# Patient Record
Sex: FEMALE | Race: WHITE | NOT HISPANIC OR LATINO | Employment: OTHER | URBAN - METROPOLITAN AREA
[De-identification: names, ages, dates, MRNs, and addresses within clinical notes are randomized per-mention and may not be internally consistent; named-entity substitution may affect disease eponyms.]

---

## 2017-01-03 ENCOUNTER — HOSPITAL ENCOUNTER (OUTPATIENT)
Dept: MAMMOGRAPHY | Facility: HOSPITAL | Age: 82
Discharge: HOME | End: 2017-01-03
Attending: INTERNAL MEDICINE

## 2017-01-03 ENCOUNTER — AMBULATORY - HEALTHEAST (OUTPATIENT)
Dept: NURSING | Facility: CLINIC | Age: 82
End: 2017-01-03

## 2017-01-03 ENCOUNTER — COMMUNICATION - HEALTHEAST (OUTPATIENT)
Dept: INTERNAL MEDICINE | Facility: CLINIC | Age: 82
End: 2017-01-03

## 2017-01-03 DIAGNOSIS — N63.22 BREAST LUMP ON LEFT SIDE AT 10 O'CLOCK POSITION: ICD-10-CM

## 2017-01-03 DIAGNOSIS — N63.20 LEFT BREAST MASS: ICD-10-CM

## 2017-01-04 ENCOUNTER — COMMUNICATION - HEALTHEAST (OUTPATIENT)
Dept: ONCOLOGY | Facility: HOSPITAL | Age: 82
End: 2017-01-04

## 2017-01-04 ENCOUNTER — AMBULATORY - HEALTHEAST (OUTPATIENT)
Dept: NURSING | Facility: CLINIC | Age: 82
End: 2017-01-04

## 2017-01-05 ENCOUNTER — COMMUNICATION - HEALTHEAST (OUTPATIENT)
Dept: ONCOLOGY | Facility: HOSPITAL | Age: 82
End: 2017-01-05

## 2017-01-05 ENCOUNTER — AMBULATORY - HEALTHEAST (OUTPATIENT)
Dept: NURSING | Facility: CLINIC | Age: 82
End: 2017-01-05

## 2017-01-05 ENCOUNTER — AMBULATORY - HEALTHEAST (OUTPATIENT)
Dept: LAB | Facility: CLINIC | Age: 82
End: 2017-01-05

## 2017-01-05 ENCOUNTER — OFFICE VISIT - HEALTHEAST (OUTPATIENT)
Dept: INTERNAL MEDICINE | Facility: CLINIC | Age: 82
End: 2017-01-05

## 2017-01-05 ENCOUNTER — AMBULATORY - HEALTHEAST (OUTPATIENT)
Dept: INTERNAL MEDICINE | Facility: CLINIC | Age: 82
End: 2017-01-05

## 2017-01-05 ENCOUNTER — COMMUNICATION - HEALTHEAST (OUTPATIENT)
Dept: INTERNAL MEDICINE | Facility: CLINIC | Age: 82
End: 2017-01-05

## 2017-01-05 DIAGNOSIS — D64.9 LOW HEMOGLOBIN: ICD-10-CM

## 2017-01-05 DIAGNOSIS — C50.919 BREAST CA (H): ICD-10-CM

## 2017-01-05 LAB
LAB AP CHARGES (HE HISTORICAL CONVERSION): ABNORMAL
LAB AP IHC ER/PR REPORT,ADDENDUM (HE HISTORICAL CONVERSION): ABNORMAL
PATH REPORT.COMMENTS IMP SPEC: ABNORMAL
PATH REPORT.COMMENTS IMP SPEC: ABNORMAL
PATH REPORT.FINAL DX SPEC: ABNORMAL
PATH REPORT.GROSS SPEC: ABNORMAL
PATH REPORT.RELEVANT HX SPEC: ABNORMAL
RESULT FLAG (HE HISTORICAL CONVERSION): ABNORMAL

## 2017-01-06 ENCOUNTER — AMBULATORY - HEALTHEAST (OUTPATIENT)
Dept: NURSING | Facility: CLINIC | Age: 82
End: 2017-01-06

## 2017-01-06 ENCOUNTER — COMMUNICATION - HEALTHEAST (OUTPATIENT)
Dept: TELEHEALTH | Facility: CLINIC | Age: 82
End: 2017-01-06

## 2017-01-06 DIAGNOSIS — D64.9 ANEMIA: ICD-10-CM

## 2017-01-06 DIAGNOSIS — D64.9 HEMOGLOBIN LOW: ICD-10-CM

## 2017-01-12 ENCOUNTER — OFFICE VISIT - HEALTHEAST (OUTPATIENT)
Dept: INTERNAL MEDICINE | Facility: CLINIC | Age: 82
End: 2017-01-12

## 2017-01-12 ENCOUNTER — AMBULATORY - HEALTHEAST (OUTPATIENT)
Dept: LAB | Facility: CLINIC | Age: 82
End: 2017-01-12

## 2017-01-12 DIAGNOSIS — D64.9 HEMOGLOBIN LOW: ICD-10-CM

## 2017-01-12 DIAGNOSIS — D64.9 ANEMIA, UNSPECIFIED: ICD-10-CM

## 2017-01-12 DIAGNOSIS — D50.9 IRON DEFICIENCY ANEMIA: ICD-10-CM

## 2017-01-12 ASSESSMENT — MIFFLIN-ST. JEOR: SCORE: 980.79

## 2017-01-17 ENCOUNTER — OFFICE VISIT - HEALTHEAST (OUTPATIENT)
Dept: SURGERY | Facility: CLINIC | Age: 82
End: 2017-01-17

## 2017-01-17 DIAGNOSIS — C50.512 MALIGNANT NEOPLASM OF LOWER-OUTER QUADRANT OF LEFT FEMALE BREAST (H): ICD-10-CM

## 2017-01-18 ENCOUNTER — AMBULATORY - HEALTHEAST (OUTPATIENT)
Dept: LAB | Facility: CLINIC | Age: 82
End: 2017-01-18

## 2017-01-18 DIAGNOSIS — D64.9 ANEMIA: ICD-10-CM

## 2017-01-18 DIAGNOSIS — D64.9 HEMOGLOBIN LOW: ICD-10-CM

## 2017-01-19 ENCOUNTER — COMMUNICATION - HEALTHEAST (OUTPATIENT)
Dept: INTERNAL MEDICINE | Facility: CLINIC | Age: 82
End: 2017-01-19

## 2017-01-26 ENCOUNTER — COMMUNICATION - HEALTHEAST (OUTPATIENT)
Dept: INTERNAL MEDICINE | Facility: CLINIC | Age: 82
End: 2017-01-26

## 2017-01-26 ENCOUNTER — OFFICE VISIT - HEALTHEAST (OUTPATIENT)
Dept: INTERNAL MEDICINE | Facility: CLINIC | Age: 82
End: 2017-01-26

## 2017-01-26 DIAGNOSIS — Z86.718 HISTORY OF DVT (DEEP VEIN THROMBOSIS): ICD-10-CM

## 2017-01-26 DIAGNOSIS — D64.9 ANEMIA: ICD-10-CM

## 2017-01-26 DIAGNOSIS — E55.9 VITAMIN D DEFICIENCY: ICD-10-CM

## 2017-01-26 DIAGNOSIS — M81.0 OSTEOPOROSIS: ICD-10-CM

## 2017-01-26 ASSESSMENT — MIFFLIN-ST. JEOR: SCORE: 978.98

## 2017-02-01 ENCOUNTER — AMBULATORY - HEALTHEAST (OUTPATIENT)
Dept: LAB | Facility: CLINIC | Age: 82
End: 2017-02-01

## 2017-02-01 DIAGNOSIS — D64.9 ANEMIA, UNSPECIFIED: ICD-10-CM

## 2017-02-08 ENCOUNTER — AMBULATORY - HEALTHEAST (OUTPATIENT)
Dept: LAB | Facility: CLINIC | Age: 82
End: 2017-02-08

## 2017-02-08 ENCOUNTER — COMMUNICATION - HEALTHEAST (OUTPATIENT)
Dept: INTERNAL MEDICINE | Facility: CLINIC | Age: 82
End: 2017-02-08

## 2017-02-08 DIAGNOSIS — Z86.718 HISTORY OF DVT (DEEP VEIN THROMBOSIS): ICD-10-CM

## 2017-02-08 DIAGNOSIS — D64.9 ANEMIA, UNSPECIFIED: ICD-10-CM

## 2017-02-08 DIAGNOSIS — I82.409 DVT (DEEP VENOUS THROMBOSIS) (H): ICD-10-CM

## 2017-02-08 DIAGNOSIS — D68.59 PRIMARY HYPERCOAGULABLE STATE (H): ICD-10-CM

## 2017-02-10 ENCOUNTER — COMMUNICATION - HEALTHEAST (OUTPATIENT)
Dept: INTERNAL MEDICINE | Facility: CLINIC | Age: 82
End: 2017-02-10

## 2017-02-23 ENCOUNTER — OFFICE VISIT - HEALTHEAST (OUTPATIENT)
Dept: INTERNAL MEDICINE | Facility: CLINIC | Age: 82
End: 2017-02-23

## 2017-02-23 ENCOUNTER — COMMUNICATION - HEALTHEAST (OUTPATIENT)
Dept: INTERNAL MEDICINE | Facility: CLINIC | Age: 82
End: 2017-02-23

## 2017-02-23 DIAGNOSIS — Z86.718 HISTORY OF DVT (DEEP VEIN THROMBOSIS): ICD-10-CM

## 2017-02-23 DIAGNOSIS — D50.9 IRON DEFICIENCY ANEMIA: ICD-10-CM

## 2017-02-23 DIAGNOSIS — D68.59 PRIMARY HYPERCOAGULABLE STATE (H): ICD-10-CM

## 2017-02-23 DIAGNOSIS — I82.409 DVT (DEEP VENOUS THROMBOSIS) (H): ICD-10-CM

## 2017-02-23 DIAGNOSIS — I10 ESSENTIAL HYPERTENSION: ICD-10-CM

## 2017-02-23 ASSESSMENT — MIFFLIN-ST. JEOR: SCORE: 949.95

## 2017-03-09 ENCOUNTER — AMBULATORY - HEALTHEAST (OUTPATIENT)
Dept: LAB | Facility: CLINIC | Age: 82
End: 2017-03-09

## 2017-03-09 ENCOUNTER — COMMUNICATION - HEALTHEAST (OUTPATIENT)
Dept: INTERNAL MEDICINE | Facility: CLINIC | Age: 82
End: 2017-03-09

## 2017-03-09 DIAGNOSIS — D68.59 PRIMARY HYPERCOAGULABLE STATE (H): ICD-10-CM

## 2017-03-09 DIAGNOSIS — I82.409 DVT (DEEP VENOUS THROMBOSIS) (H): ICD-10-CM

## 2017-03-09 DIAGNOSIS — Z86.718 HISTORY OF DVT (DEEP VEIN THROMBOSIS): ICD-10-CM

## 2017-03-17 ENCOUNTER — OFFICE VISIT - HEALTHEAST (OUTPATIENT)
Dept: SURGERY | Facility: CLINIC | Age: 82
End: 2017-03-17

## 2017-03-17 DIAGNOSIS — C50.312 MALIGNANT NEOPLASM OF LOWER-INNER QUADRANT OF LEFT FEMALE BREAST (H): ICD-10-CM

## 2017-03-23 ENCOUNTER — COMMUNICATION - HEALTHEAST (OUTPATIENT)
Dept: INTERNAL MEDICINE | Facility: CLINIC | Age: 82
End: 2017-03-23

## 2017-03-23 ENCOUNTER — AMBULATORY - HEALTHEAST (OUTPATIENT)
Dept: LAB | Facility: CLINIC | Age: 82
End: 2017-03-23

## 2017-03-23 DIAGNOSIS — D50.9 IRON DEFICIENCY ANEMIA: ICD-10-CM

## 2017-03-23 DIAGNOSIS — D68.59 PRIMARY HYPERCOAGULABLE STATE (H): ICD-10-CM

## 2017-03-23 DIAGNOSIS — I82.409 DVT (DEEP VENOUS THROMBOSIS) (H): ICD-10-CM

## 2017-04-07 ENCOUNTER — COMMUNICATION - HEALTHEAST (OUTPATIENT)
Dept: INTERNAL MEDICINE | Facility: CLINIC | Age: 82
End: 2017-04-07

## 2017-04-07 ENCOUNTER — AMBULATORY - HEALTHEAST (OUTPATIENT)
Dept: LAB | Facility: CLINIC | Age: 82
End: 2017-04-07

## 2017-04-07 DIAGNOSIS — D64.9 ANEMIA, UNSPECIFIED: ICD-10-CM

## 2017-04-07 DIAGNOSIS — D68.59 PRIMARY HYPERCOAGULABLE STATE (H): ICD-10-CM

## 2017-04-07 DIAGNOSIS — I82.409 DVT (DEEP VENOUS THROMBOSIS) (H): ICD-10-CM

## 2017-04-07 DIAGNOSIS — Z86.718 HISTORY OF DVT (DEEP VEIN THROMBOSIS): ICD-10-CM

## 2017-04-09 ENCOUNTER — COMMUNICATION - HEALTHEAST (OUTPATIENT)
Dept: INTERNAL MEDICINE | Facility: CLINIC | Age: 82
End: 2017-04-09

## 2017-04-20 ENCOUNTER — OFFICE VISIT - HEALTHEAST (OUTPATIENT)
Dept: PODIATRY | Facility: CLINIC | Age: 82
End: 2017-04-20

## 2017-04-20 ENCOUNTER — OFFICE VISIT - HEALTHEAST (OUTPATIENT)
Dept: INTERNAL MEDICINE | Facility: CLINIC | Age: 82
End: 2017-04-20

## 2017-04-20 DIAGNOSIS — L84 TYLOMA: ICD-10-CM

## 2017-04-20 DIAGNOSIS — D50.9 IRON DEFICIENCY ANEMIA: ICD-10-CM

## 2017-04-20 ASSESSMENT — MIFFLIN-ST. JEOR: SCORE: 977.17

## 2017-04-21 ENCOUNTER — COMMUNICATION - HEALTHEAST (OUTPATIENT)
Dept: INTERNAL MEDICINE | Facility: CLINIC | Age: 82
End: 2017-04-21

## 2017-05-05 ENCOUNTER — AMBULATORY - HEALTHEAST (OUTPATIENT)
Dept: LAB | Facility: CLINIC | Age: 82
End: 2017-05-05

## 2017-05-05 ENCOUNTER — COMMUNICATION - HEALTHEAST (OUTPATIENT)
Dept: INTERNAL MEDICINE | Facility: CLINIC | Age: 82
End: 2017-05-05

## 2017-05-05 DIAGNOSIS — E78.5 HYPERLIPEMIA: ICD-10-CM

## 2017-05-05 DIAGNOSIS — D68.59 PRIMARY HYPERCOAGULABLE STATE (H): ICD-10-CM

## 2017-05-05 DIAGNOSIS — I82.409 DVT (DEEP VENOUS THROMBOSIS) (H): ICD-10-CM

## 2017-05-05 DIAGNOSIS — D64.9 ANEMIA: ICD-10-CM

## 2017-05-05 DIAGNOSIS — Z86.718 HISTORY OF DVT (DEEP VEIN THROMBOSIS): ICD-10-CM

## 2017-05-15 ENCOUNTER — COMMUNICATION - HEALTHEAST (OUTPATIENT)
Dept: INTERNAL MEDICINE | Facility: CLINIC | Age: 82
End: 2017-05-15

## 2017-06-07 ENCOUNTER — AMBULATORY - HEALTHEAST (OUTPATIENT)
Dept: LAB | Facility: CLINIC | Age: 82
End: 2017-06-07

## 2017-06-07 ENCOUNTER — COMMUNICATION - HEALTHEAST (OUTPATIENT)
Dept: INTERNAL MEDICINE | Facility: CLINIC | Age: 82
End: 2017-06-07

## 2017-06-07 DIAGNOSIS — D64.9 ANEMIA: ICD-10-CM

## 2017-06-07 DIAGNOSIS — Z86.718 HISTORY OF DVT (DEEP VEIN THROMBOSIS): ICD-10-CM

## 2017-06-07 DIAGNOSIS — D68.59 PRIMARY HYPERCOAGULABLE STATE (H): ICD-10-CM

## 2017-06-07 DIAGNOSIS — I82.409 DVT (DEEP VENOUS THROMBOSIS) (H): ICD-10-CM

## 2017-06-08 ENCOUNTER — COMMUNICATION - HEALTHEAST (OUTPATIENT)
Dept: INTERNAL MEDICINE | Facility: CLINIC | Age: 82
End: 2017-06-08

## 2017-07-07 ENCOUNTER — COMMUNICATION - HEALTHEAST (OUTPATIENT)
Dept: INTERNAL MEDICINE | Facility: CLINIC | Age: 82
End: 2017-07-07

## 2017-07-07 ENCOUNTER — AMBULATORY - HEALTHEAST (OUTPATIENT)
Dept: LAB | Facility: CLINIC | Age: 82
End: 2017-07-07

## 2017-07-07 DIAGNOSIS — D68.59 PRIMARY HYPERCOAGULABLE STATE (H): ICD-10-CM

## 2017-07-07 DIAGNOSIS — Z86.718 HISTORY OF DVT (DEEP VEIN THROMBOSIS): ICD-10-CM

## 2017-07-07 DIAGNOSIS — I82.409 DVT (DEEP VENOUS THROMBOSIS) (H): ICD-10-CM

## 2017-07-07 DIAGNOSIS — D64.9 ANEMIA: ICD-10-CM

## 2017-07-27 ENCOUNTER — OFFICE VISIT - HEALTHEAST (OUTPATIENT)
Dept: INTERNAL MEDICINE | Facility: CLINIC | Age: 82
End: 2017-07-27

## 2017-07-27 DIAGNOSIS — M81.0 OSTEOPOROSIS: ICD-10-CM

## 2017-07-27 DIAGNOSIS — D50.9 IRON DEFICIENCY ANEMIA: ICD-10-CM

## 2017-07-27 ASSESSMENT — MIFFLIN-ST. JEOR: SCORE: 980.34

## 2017-08-11 ENCOUNTER — COMMUNICATION - HEALTHEAST (OUTPATIENT)
Dept: INTERNAL MEDICINE | Facility: CLINIC | Age: 82
End: 2017-08-11

## 2017-08-11 ENCOUNTER — AMBULATORY - HEALTHEAST (OUTPATIENT)
Dept: LAB | Facility: CLINIC | Age: 82
End: 2017-08-11

## 2017-08-11 DIAGNOSIS — I82.409 DVT (DEEP VENOUS THROMBOSIS) (H): ICD-10-CM

## 2017-08-11 DIAGNOSIS — D68.59 PRIMARY HYPERCOAGULABLE STATE (H): ICD-10-CM

## 2017-08-11 DIAGNOSIS — D64.9 ANEMIA: ICD-10-CM

## 2017-08-11 DIAGNOSIS — Z86.718 HISTORY OF DVT (DEEP VEIN THROMBOSIS): ICD-10-CM

## 2017-08-11 DIAGNOSIS — D50.9 IRON DEFICIENCY ANEMIA: ICD-10-CM

## 2017-09-07 ENCOUNTER — OFFICE VISIT - HEALTHEAST (OUTPATIENT)
Dept: SURGERY | Facility: CLINIC | Age: 82
End: 2017-09-07

## 2017-09-07 ENCOUNTER — COMMUNICATION - HEALTHEAST (OUTPATIENT)
Dept: SURGERY | Facility: CLINIC | Age: 82
End: 2017-09-07

## 2017-09-07 DIAGNOSIS — C50.312 MALIGNANT NEOPLASM OF LOWER-INNER QUADRANT OF LEFT FEMALE BREAST (H): ICD-10-CM

## 2017-09-15 ENCOUNTER — COMMUNICATION - HEALTHEAST (OUTPATIENT)
Dept: INTERNAL MEDICINE | Facility: CLINIC | Age: 82
End: 2017-09-15

## 2017-09-15 ENCOUNTER — AMBULATORY - HEALTHEAST (OUTPATIENT)
Dept: LAB | Facility: CLINIC | Age: 82
End: 2017-09-15

## 2017-09-15 DIAGNOSIS — Z86.718 HISTORY OF DVT (DEEP VEIN THROMBOSIS): ICD-10-CM

## 2017-09-15 DIAGNOSIS — D68.59 PRIMARY HYPERCOAGULABLE STATE (H): ICD-10-CM

## 2017-09-15 DIAGNOSIS — D64.9 ANEMIA: ICD-10-CM

## 2017-09-15 DIAGNOSIS — I82.409 DVT (DEEP VENOUS THROMBOSIS) (H): ICD-10-CM

## 2017-09-29 ENCOUNTER — COMMUNICATION - HEALTHEAST (OUTPATIENT)
Dept: INTERNAL MEDICINE | Facility: CLINIC | Age: 82
End: 2017-09-29

## 2017-09-29 ENCOUNTER — AMBULATORY - HEALTHEAST (OUTPATIENT)
Dept: LAB | Facility: CLINIC | Age: 82
End: 2017-09-29

## 2017-09-29 DIAGNOSIS — I82.409 DVT (DEEP VENOUS THROMBOSIS) (H): ICD-10-CM

## 2017-09-29 DIAGNOSIS — D68.59 PRIMARY HYPERCOAGULABLE STATE (H): ICD-10-CM

## 2017-09-29 DIAGNOSIS — Z86.718 HISTORY OF DVT (DEEP VEIN THROMBOSIS): ICD-10-CM

## 2017-10-20 ENCOUNTER — COMMUNICATION - HEALTHEAST (OUTPATIENT)
Dept: INTERNAL MEDICINE | Facility: CLINIC | Age: 82
End: 2017-10-20

## 2017-10-20 DIAGNOSIS — Z86.718 HISTORY OF DVT (DEEP VEIN THROMBOSIS): ICD-10-CM

## 2017-10-26 ENCOUNTER — COMMUNICATION - HEALTHEAST (OUTPATIENT)
Dept: NURSING | Facility: CLINIC | Age: 82
End: 2017-10-26

## 2017-10-27 ENCOUNTER — COMMUNICATION - HEALTHEAST (OUTPATIENT)
Dept: INTERNAL MEDICINE | Facility: CLINIC | Age: 82
End: 2017-10-27

## 2017-10-27 ENCOUNTER — AMBULATORY - HEALTHEAST (OUTPATIENT)
Dept: LAB | Facility: CLINIC | Age: 82
End: 2017-10-27

## 2017-10-27 DIAGNOSIS — Z86.718 HISTORY OF DVT (DEEP VEIN THROMBOSIS): ICD-10-CM

## 2017-10-27 DIAGNOSIS — D68.59 PRIMARY HYPERCOAGULABLE STATE (H): ICD-10-CM

## 2017-10-27 DIAGNOSIS — I82.409 DVT (DEEP VENOUS THROMBOSIS) (H): ICD-10-CM

## 2017-11-06 ENCOUNTER — OFFICE VISIT - HEALTHEAST (OUTPATIENT)
Dept: INTERNAL MEDICINE | Facility: CLINIC | Age: 82
End: 2017-11-06

## 2017-11-06 DIAGNOSIS — E53.8 VITAMIN B 12 DEFICIENCY: ICD-10-CM

## 2017-11-06 DIAGNOSIS — Z51.81 MEDICATION MONITORING ENCOUNTER: ICD-10-CM

## 2017-11-06 DIAGNOSIS — I82.409 DVT (DEEP VENOUS THROMBOSIS) (H): ICD-10-CM

## 2017-11-06 DIAGNOSIS — Z23 NEED FOR INFLUENZA VACCINATION: ICD-10-CM

## 2017-11-06 DIAGNOSIS — M81.0 OSTEOPOROSIS: ICD-10-CM

## 2017-11-06 DIAGNOSIS — E78.5 HYPERLIPIDEMIA: ICD-10-CM

## 2017-11-06 DIAGNOSIS — D50.9 IRON DEFICIENCY ANEMIA: ICD-10-CM

## 2017-11-06 LAB
CHOLEST SERPL-MCNC: 155 MG/DL
FASTING STATUS PATIENT QL REPORTED: NO
HDLC SERPL-MCNC: 41 MG/DL
LDLC SERPL CALC-MCNC: 81 MG/DL
TRIGL SERPL-MCNC: 164 MG/DL

## 2017-11-06 ASSESSMENT — MIFFLIN-ST. JEOR: SCORE: 975.81

## 2017-11-07 ENCOUNTER — COMMUNICATION - HEALTHEAST (OUTPATIENT)
Dept: INTERNAL MEDICINE | Facility: CLINIC | Age: 82
End: 2017-11-07

## 2017-11-07 DIAGNOSIS — E78.5 HYPERLIPEMIA: ICD-10-CM

## 2017-11-10 ENCOUNTER — COMMUNICATION - HEALTHEAST (OUTPATIENT)
Dept: INTERNAL MEDICINE | Facility: CLINIC | Age: 82
End: 2017-11-10

## 2017-11-28 ENCOUNTER — COMMUNICATION - HEALTHEAST (OUTPATIENT)
Dept: INTERNAL MEDICINE | Facility: CLINIC | Age: 82
End: 2017-11-28

## 2017-11-28 ENCOUNTER — AMBULATORY - HEALTHEAST (OUTPATIENT)
Dept: LAB | Facility: CLINIC | Age: 82
End: 2017-11-28

## 2017-11-28 DIAGNOSIS — Z86.718 HISTORY OF DVT (DEEP VEIN THROMBOSIS): ICD-10-CM

## 2017-11-28 DIAGNOSIS — D68.59 PRIMARY HYPERCOAGULABLE STATE (H): ICD-10-CM

## 2017-11-28 DIAGNOSIS — I82.409 DVT (DEEP VENOUS THROMBOSIS) (H): ICD-10-CM

## 2017-12-12 ENCOUNTER — COMMUNICATION - HEALTHEAST (OUTPATIENT)
Dept: INTERNAL MEDICINE | Facility: CLINIC | Age: 82
End: 2017-12-12

## 2017-12-12 ENCOUNTER — AMBULATORY - HEALTHEAST (OUTPATIENT)
Dept: LAB | Facility: CLINIC | Age: 82
End: 2017-12-12

## 2017-12-12 DIAGNOSIS — I82.409 DVT (DEEP VENOUS THROMBOSIS) (H): ICD-10-CM

## 2017-12-12 DIAGNOSIS — Z86.718 HISTORY OF DVT (DEEP VEIN THROMBOSIS): ICD-10-CM

## 2017-12-12 DIAGNOSIS — D68.59 PRIMARY HYPERCOAGULABLE STATE (H): ICD-10-CM

## 2017-12-14 ENCOUNTER — RECORDS - HEALTHEAST (OUTPATIENT)
Dept: ADMINISTRATIVE | Facility: OTHER | Age: 82
End: 2017-12-14

## 2017-12-16 ENCOUNTER — COMMUNICATION - HEALTHEAST (OUTPATIENT)
Dept: INTERNAL MEDICINE | Facility: CLINIC | Age: 82
End: 2017-12-16

## 2017-12-28 ENCOUNTER — COMMUNICATION - HEALTHEAST (OUTPATIENT)
Dept: SCHEDULING | Facility: CLINIC | Age: 82
End: 2017-12-28

## 2017-12-28 DIAGNOSIS — I87.009 POST-PHLEBITIC SYNDROME: ICD-10-CM

## 2017-12-29 ENCOUNTER — RECORDS - HEALTHEAST (OUTPATIENT)
Dept: BONE DENSITY | Facility: CLINIC | Age: 82
End: 2017-12-29

## 2017-12-29 ENCOUNTER — COMMUNICATION - HEALTHEAST (OUTPATIENT)
Dept: NURSING | Facility: CLINIC | Age: 82
End: 2017-12-29

## 2017-12-29 ENCOUNTER — RECORDS - HEALTHEAST (OUTPATIENT)
Dept: ADMINISTRATIVE | Facility: OTHER | Age: 82
End: 2017-12-29

## 2017-12-29 ENCOUNTER — COMMUNICATION - HEALTHEAST (OUTPATIENT)
Dept: INTERNAL MEDICINE | Facility: CLINIC | Age: 82
End: 2017-12-29

## 2017-12-29 ENCOUNTER — AMBULATORY - HEALTHEAST (OUTPATIENT)
Dept: LAB | Facility: CLINIC | Age: 82
End: 2017-12-29

## 2017-12-29 DIAGNOSIS — M81.0 AGE-RELATED OSTEOPOROSIS WITHOUT CURRENT PATHOLOGICAL FRACTURE: ICD-10-CM

## 2017-12-29 DIAGNOSIS — D50.9 IRON DEFICIENCY ANEMIA: ICD-10-CM

## 2017-12-29 DIAGNOSIS — D68.59 PRIMARY HYPERCOAGULABLE STATE (H): ICD-10-CM

## 2017-12-29 DIAGNOSIS — I82.409 DVT (DEEP VENOUS THROMBOSIS) (H): ICD-10-CM

## 2017-12-29 DIAGNOSIS — Z86.718 HISTORY OF DVT (DEEP VEIN THROMBOSIS): ICD-10-CM

## 2018-01-01 ENCOUNTER — RECORDS - HEALTHEAST (OUTPATIENT)
Dept: LAB | Facility: CLINIC | Age: 83
End: 2018-01-01

## 2018-01-01 ENCOUNTER — AMBULATORY - HEALTHEAST (OUTPATIENT)
Dept: LAB | Facility: CLINIC | Age: 83
End: 2018-01-01

## 2018-01-01 ENCOUNTER — INFUSION - HEALTHEAST (OUTPATIENT)
Dept: INFUSION THERAPY | Facility: HOSPITAL | Age: 83
End: 2018-01-01

## 2018-01-01 ENCOUNTER — COMMUNICATION - HEALTHEAST (OUTPATIENT)
Dept: INTERNAL MEDICINE | Facility: CLINIC | Age: 83
End: 2018-01-01

## 2018-01-01 ENCOUNTER — HOSPITAL ENCOUNTER (OUTPATIENT)
Dept: MAMMOGRAPHY | Facility: CLINIC | Age: 83
Discharge: HOME | End: 2018-12-07
Attending: SPECIALIST

## 2018-01-01 ENCOUNTER — HOSPITAL ENCOUNTER (OUTPATIENT)
Dept: SURGERY | Facility: CLINIC | Age: 83
Discharge: HOME | End: 2018-12-07
Attending: SPECIALIST

## 2018-01-01 ENCOUNTER — RECORDS - HEALTHEAST (OUTPATIENT)
Dept: ADMINISTRATIVE | Facility: OTHER | Age: 83
End: 2018-01-01

## 2018-01-01 ENCOUNTER — AMBULATORY - HEALTHEAST (OUTPATIENT)
Dept: NURSING | Facility: CLINIC | Age: 83
End: 2018-01-01

## 2018-01-01 ENCOUNTER — HOSPITAL ENCOUNTER (OUTPATIENT)
Dept: ULTRASOUND IMAGING | Facility: CLINIC | Age: 83
Discharge: HOME | End: 2018-12-04
Attending: INTERNAL MEDICINE

## 2018-01-01 ENCOUNTER — COMMUNICATION - HEALTHEAST (OUTPATIENT)
Dept: ANTICOAGULATION | Facility: CLINIC | Age: 83
End: 2018-01-01

## 2018-01-01 ENCOUNTER — OFFICE VISIT - HEALTHEAST (OUTPATIENT)
Dept: INTERNAL MEDICINE | Facility: CLINIC | Age: 83
End: 2018-01-01

## 2018-01-01 ENCOUNTER — AMBULATORY - HEALTHEAST (OUTPATIENT)
Dept: INTERNAL MEDICINE | Facility: CLINIC | Age: 83
End: 2018-01-01

## 2018-01-01 ENCOUNTER — AMBULATORY - HEALTHEAST (OUTPATIENT)
Dept: PHARMACY | Facility: CLINIC | Age: 83
End: 2018-01-01

## 2018-01-01 ENCOUNTER — PATIENT OUTREACH (OUTPATIENT)
Dept: CARE COORDINATION | Facility: CLINIC | Age: 83
End: 2018-01-01

## 2018-01-01 ENCOUNTER — COMMUNICATION - HEALTHEAST (OUTPATIENT)
Dept: SCHEDULING | Facility: CLINIC | Age: 83
End: 2018-01-01

## 2018-01-01 ENCOUNTER — SURGERY - HEALTHEAST (OUTPATIENT)
Dept: SURGERY | Facility: CLINIC | Age: 83
End: 2018-01-01

## 2018-01-01 ENCOUNTER — ANESTHESIA - HEALTHEAST (OUTPATIENT)
Dept: SURGERY | Facility: CLINIC | Age: 83
End: 2018-01-01

## 2018-01-01 DIAGNOSIS — I10 ESSENTIAL HYPERTENSION: ICD-10-CM

## 2018-01-01 DIAGNOSIS — D50.9 IRON DEFICIENCY ANEMIA, UNSPECIFIED IRON DEFICIENCY ANEMIA TYPE: ICD-10-CM

## 2018-01-01 DIAGNOSIS — Z23 FLU VACCINE NEED: ICD-10-CM

## 2018-01-01 DIAGNOSIS — Z86.718 HISTORY OF DEEP VENOUS THROMBOSIS: ICD-10-CM

## 2018-01-01 DIAGNOSIS — Z17.0 MALIGNANT NEOPLASM OF LOWER-INNER QUADRANT OF LEFT BREAST IN FEMALE, ESTROGEN RECEPTOR POSITIVE (H): ICD-10-CM

## 2018-01-01 DIAGNOSIS — Z00.00 HEALTH CARE MAINTENANCE: ICD-10-CM

## 2018-01-01 DIAGNOSIS — R60.9 EDEMA, UNSPECIFIED TYPE: ICD-10-CM

## 2018-01-01 DIAGNOSIS — Z86.718 HISTORY OF DVT (DEEP VEIN THROMBOSIS): ICD-10-CM

## 2018-01-01 DIAGNOSIS — I82.409 DVT (DEEP VENOUS THROMBOSIS) (H): ICD-10-CM

## 2018-01-01 DIAGNOSIS — D64.9 ANEMIA, UNSPECIFIED TYPE: ICD-10-CM

## 2018-01-01 DIAGNOSIS — D68.59 PRIMARY HYPERCOAGULABLE STATE (H): ICD-10-CM

## 2018-01-01 DIAGNOSIS — I82.4Z2 ACUTE VENOUS EMBOLISM AND THROMBOSIS OF DEEP VESSELS OF DISTAL END OF LEFT LOWER EXTREMITY (H): ICD-10-CM

## 2018-01-01 DIAGNOSIS — N30.00 ACUTE CYSTITIS WITHOUT HEMATURIA: ICD-10-CM

## 2018-01-01 DIAGNOSIS — D50.0 IRON DEFICIENCY ANEMIA DUE TO CHRONIC BLOOD LOSS: ICD-10-CM

## 2018-01-01 DIAGNOSIS — D50.9 ANEMIA, IRON DEFICIENCY: ICD-10-CM

## 2018-01-01 DIAGNOSIS — D62 ANEMIA DUE TO BLOOD LOSS, ACUTE: ICD-10-CM

## 2018-01-01 DIAGNOSIS — Z85.3 PERSONAL HISTORY OF BREAST CANCER: ICD-10-CM

## 2018-01-01 DIAGNOSIS — K22.70 BARRETT'S ESOPHAGUS WITHOUT DYSPLASIA: ICD-10-CM

## 2018-01-01 DIAGNOSIS — C50.312 MALIGNANT NEOPLASM OF LOWER-INNER QUADRANT OF LEFT BREAST IN FEMALE, ESTROGEN RECEPTOR POSITIVE (H): ICD-10-CM

## 2018-01-01 DIAGNOSIS — Z51.81 MEDICATION MONITORING ENCOUNTER: ICD-10-CM

## 2018-01-01 DIAGNOSIS — F32.A DEPRESSION: ICD-10-CM

## 2018-01-01 DIAGNOSIS — N18.30 STAGE 3 CHRONIC KIDNEY DISEASE (H): ICD-10-CM

## 2018-01-01 DIAGNOSIS — K92.1 GASTROINTESTINAL HEMORRHAGE WITH MELENA: ICD-10-CM

## 2018-01-01 LAB
ANION GAP SERPL CALCULATED.3IONS-SCNC: 9 MMOL/L (ref 5–18)
BUN SERPL-MCNC: 31 MG/DL (ref 8–28)
CALCIUM SERPL-MCNC: 8.7 MG/DL (ref 8.5–10.5)
CHLORIDE BLD-SCNC: 108 MMOL/L (ref 98–107)
CO2 SERPL-SCNC: 24 MMOL/L (ref 22–31)
CREAT SERPL-MCNC: 1.5 MG/DL (ref 0.6–1.1)
D DIMER PPP FEU-MCNC: 7.15 FEU UG/ML
ERYTHROCYTE [DISTWIDTH] IN BLOOD BY AUTOMATED COUNT: 13.7 % (ref 11–14.5)
ERYTHROCYTE [DISTWIDTH] IN BLOOD BY AUTOMATED COUNT: 13.7 % (ref 11–14.5)
ERYTHROCYTE [DISTWIDTH] IN BLOOD BY AUTOMATED COUNT: 14.7 % (ref 11–14.5)
ERYTHROCYTE [DISTWIDTH] IN BLOOD BY AUTOMATED COUNT: 16.4 % (ref 11–14.5)
ERYTHROCYTE [DISTWIDTH] IN BLOOD BY AUTOMATED COUNT: 17.2 % (ref 11–14.5)
GFR SERPL CREATININE-BSD FRML MDRD: 32 ML/MIN/1.73M2
GLUCOSE BLD-MCNC: 87 MG/DL (ref 70–125)
HCT VFR BLD AUTO: 21.9 % (ref 35–47)
HCT VFR BLD AUTO: 22.6 % (ref 35–47)
HCT VFR BLD AUTO: 28 % (ref 35–47)
HCT VFR BLD AUTO: 29.2 % (ref 35–47)
HCT VFR BLD AUTO: 30.8 % (ref 35–47)
HGB BLD-MCNC: 6.1 G/DL (ref 12–16)
HGB BLD-MCNC: 7.2 G/DL (ref 12–16)
HGB BLD-MCNC: 7.7 G/DL (ref 12–16)
HGB BLD-MCNC: 9.1 G/DL (ref 12–16)
HGB BLD-MCNC: 9.3 G/DL (ref 12–16)
HGB BLD-MCNC: 9.4 G/DL (ref 12–16)
HGB BLD-MCNC: 9.7 G/DL (ref 12–16)
HGB BLD-MCNC: 9.9 G/DL (ref 12–16)
INR PPP: 1 (ref 0.9–1.1)
INR PPP: 1.1 (ref 0.9–1.1)
INR PPP: 1.6 (ref 0.9–1.1)
INR PPP: 2.1 (ref 0.9–1.1)
INR PPP: 2.3 (ref 0.9–1.1)
INR PPP: 2.5 (ref 0.9–1.1)
INR PPP: 2.9 (ref 0.9–1.1)
INR PPP: 4.1 (ref 0.9–1.1)
INR PPP: 4.3 (ref 0.9–1.1)
MCH RBC QN AUTO: 31.3 PG (ref 27–34)
MCH RBC QN AUTO: 31.9 PG (ref 27–34)
MCH RBC QN AUTO: 32.1 PG (ref 27–34)
MCH RBC QN AUTO: 32.2 PG (ref 27–34)
MCH RBC QN AUTO: 33.3 PG (ref 27–34)
MCHC RBC AUTO-ENTMCNC: 31.2 G/DL (ref 32–36)
MCHC RBC AUTO-ENTMCNC: 31.5 G/DL (ref 32–36)
MCHC RBC AUTO-ENTMCNC: 33.1 G/DL (ref 32–36)
MCHC RBC AUTO-ENTMCNC: 33.2 G/DL (ref 32–36)
MCHC RBC AUTO-ENTMCNC: 33.8 G/DL (ref 32–36)
MCV RBC AUTO: 102 FL (ref 80–100)
MCV RBC AUTO: 103 FL (ref 80–100)
MCV RBC AUTO: 94 FL (ref 80–100)
MCV RBC AUTO: 97 FL (ref 80–100)
MCV RBC AUTO: 98 FL (ref 80–100)
PLATELET # BLD AUTO: 145 THOU/UL (ref 140–440)
PLATELET # BLD AUTO: 149 THOU/UL (ref 140–440)
PLATELET # BLD AUTO: 157 THOU/UL (ref 140–440)
PLATELET # BLD AUTO: 157 THOU/UL (ref 140–440)
PLATELET # BLD AUTO: 169 THOU/UL (ref 140–440)
PMV BLD AUTO: 11.6 FL (ref 8.5–12.5)
PMV BLD AUTO: 11.7 FL (ref 8.5–12.5)
PMV BLD AUTO: 7.7 FL (ref 7–10)
PMV BLD AUTO: 8.1 FL (ref 7–10)
PMV BLD AUTO: 8.2 FL (ref 7–10)
POTASSIUM BLD-SCNC: 4.3 MMOL/L (ref 3.5–5)
RBC # BLD AUTO: 2.26 MILL/UL (ref 3.8–5.4)
RBC # BLD AUTO: 2.3 MILL/UL (ref 3.8–5.4)
RBC # BLD AUTO: 2.85 MILL/UL (ref 3.8–5.4)
RBC # BLD AUTO: 2.97 MILL/UL (ref 3.8–5.4)
RBC # BLD AUTO: 3.01 MILL/UL (ref 3.8–5.4)
SODIUM SERPL-SCNC: 141 MMOL/L (ref 136–145)
WBC: 3.5 THOU/UL (ref 4–11)
WBC: 3.9 THOU/UL (ref 4–11)
WBC: 4.9 THOU/UL (ref 4–11)
WBC: 5.1 THOU/UL (ref 4–11)
WBC: 5.5 THOU/UL (ref 4–11)

## 2018-01-01 ASSESSMENT — MIFFLIN-ST. JEOR: SCORE: 926.13

## 2018-01-04 ENCOUNTER — COMMUNICATION - HEALTHEAST (OUTPATIENT)
Dept: INTERNAL MEDICINE | Facility: CLINIC | Age: 83
End: 2018-01-04

## 2018-01-09 ENCOUNTER — COMMUNICATION - HEALTHEAST (OUTPATIENT)
Dept: INTERNAL MEDICINE | Facility: CLINIC | Age: 83
End: 2018-01-09

## 2018-01-09 DIAGNOSIS — Z86.718 HISTORY OF DVT (DEEP VEIN THROMBOSIS): ICD-10-CM

## 2018-01-09 DIAGNOSIS — D68.59 PRIMARY HYPERCOAGULABLE STATE (H): ICD-10-CM

## 2018-01-12 ENCOUNTER — COMMUNICATION - HEALTHEAST (OUTPATIENT)
Dept: INTERNAL MEDICINE | Facility: CLINIC | Age: 83
End: 2018-01-12

## 2018-01-12 ENCOUNTER — AMBULATORY - HEALTHEAST (OUTPATIENT)
Dept: LAB | Facility: CLINIC | Age: 83
End: 2018-01-12

## 2018-01-12 DIAGNOSIS — Z86.718 HISTORY OF DVT (DEEP VEIN THROMBOSIS): ICD-10-CM

## 2018-01-12 DIAGNOSIS — D68.59 PRIMARY HYPERCOAGULABLE STATE (H): ICD-10-CM

## 2018-01-12 LAB — INR PPP: 2.8 (ref 0.9–1.1)

## 2018-02-06 ENCOUNTER — COMMUNICATION - HEALTHEAST (OUTPATIENT)
Dept: INTERNAL MEDICINE | Facility: CLINIC | Age: 83
End: 2018-02-06

## 2018-02-06 DIAGNOSIS — E78.5 HYPERLIPEMIA: ICD-10-CM

## 2018-02-12 ENCOUNTER — COMMUNICATION - HEALTHEAST (OUTPATIENT)
Dept: INTERNAL MEDICINE | Facility: CLINIC | Age: 83
End: 2018-02-12

## 2018-02-12 ENCOUNTER — AMBULATORY - HEALTHEAST (OUTPATIENT)
Dept: LAB | Facility: CLINIC | Age: 83
End: 2018-02-12

## 2018-02-12 DIAGNOSIS — D68.59 PRIMARY HYPERCOAGULABLE STATE (H): ICD-10-CM

## 2018-02-12 DIAGNOSIS — Z86.718 HISTORY OF DVT (DEEP VEIN THROMBOSIS): ICD-10-CM

## 2018-02-12 LAB — INR PPP: 2.7 (ref 0.9–1.1)

## 2018-02-27 ENCOUNTER — COMMUNICATION - HEALTHEAST (OUTPATIENT)
Dept: INTERNAL MEDICINE | Facility: CLINIC | Age: 83
End: 2018-02-27

## 2018-02-27 DIAGNOSIS — I10 ESSENTIAL HYPERTENSION: ICD-10-CM

## 2018-03-15 ENCOUNTER — COMMUNICATION - HEALTHEAST (OUTPATIENT)
Dept: INTERNAL MEDICINE | Facility: CLINIC | Age: 83
End: 2018-03-15

## 2018-03-15 ENCOUNTER — OFFICE VISIT - HEALTHEAST (OUTPATIENT)
Dept: INTERNAL MEDICINE | Facility: CLINIC | Age: 83
End: 2018-03-15

## 2018-03-15 DIAGNOSIS — D50.0 IRON DEFICIENCY ANEMIA DUE TO CHRONIC BLOOD LOSS: ICD-10-CM

## 2018-03-15 DIAGNOSIS — I87.009 POST-PHLEBITIC SYNDROME: ICD-10-CM

## 2018-03-15 DIAGNOSIS — N18.30 CHRONIC RENAL INSUFFICIENCY, STAGE 3 (MODERATE) (H): ICD-10-CM

## 2018-03-15 DIAGNOSIS — Z86.718 HISTORY OF DVT (DEEP VEIN THROMBOSIS): ICD-10-CM

## 2018-03-15 DIAGNOSIS — D68.59 PRIMARY HYPERCOAGULABLE STATE (H): ICD-10-CM

## 2018-03-15 LAB
ANION GAP SERPL CALCULATED.3IONS-SCNC: 8 MMOL/L (ref 5–18)
BUN SERPL-MCNC: 43 MG/DL (ref 8–28)
CALCIUM SERPL-MCNC: 9.5 MG/DL (ref 8.5–10.5)
CHLORIDE BLD-SCNC: 109 MMOL/L (ref 98–107)
CO2 SERPL-SCNC: 24 MMOL/L (ref 22–31)
CREAT SERPL-MCNC: 1.52 MG/DL (ref 0.6–1.1)
ERYTHROCYTE [DISTWIDTH] IN BLOOD BY AUTOMATED COUNT: 13.2 % (ref 11–14.5)
GFR SERPL CREATININE-BSD FRML MDRD: 32 ML/MIN/1.73M2
GLUCOSE BLD-MCNC: 95 MG/DL (ref 70–125)
HCT VFR BLD AUTO: 27.4 % (ref 35–47)
HGB BLD-MCNC: 9.5 G/DL (ref 12–16)
INR PPP: 4 (ref 0.9–1.1)
MCH RBC QN AUTO: 32.3 PG (ref 27–34)
MCHC RBC AUTO-ENTMCNC: 34.8 G/DL (ref 32–36)
MCV RBC AUTO: 93 FL (ref 80–100)
PLATELET # BLD AUTO: 155 THOU/UL (ref 140–440)
PMV BLD AUTO: 7.9 FL (ref 7–10)
POTASSIUM BLD-SCNC: 4.5 MMOL/L (ref 3.5–5)
RBC # BLD AUTO: 2.95 MILL/UL (ref 3.8–5.4)
SODIUM SERPL-SCNC: 141 MMOL/L (ref 136–145)
WBC: 4.4 THOU/UL (ref 4–11)

## 2018-03-15 ASSESSMENT — MIFFLIN-ST. JEOR: SCORE: 955.85

## 2018-03-16 ENCOUNTER — COMMUNICATION - HEALTHEAST (OUTPATIENT)
Dept: INTERNAL MEDICINE | Facility: CLINIC | Age: 83
End: 2018-03-16

## 2018-03-19 ENCOUNTER — RECORDS - HEALTHEAST (OUTPATIENT)
Dept: ADMINISTRATIVE | Facility: OTHER | Age: 83
End: 2018-03-19

## 2018-03-20 ENCOUNTER — OFFICE VISIT - HEALTHEAST (OUTPATIENT)
Dept: SURGERY | Facility: CLINIC | Age: 83
End: 2018-03-20

## 2018-03-20 DIAGNOSIS — Z17.0 MALIGNANT NEOPLASM OF LOWER-INNER QUADRANT OF LEFT BREAST IN FEMALE, ESTROGEN RECEPTOR POSITIVE (H): ICD-10-CM

## 2018-03-20 DIAGNOSIS — C50.312 MALIGNANT NEOPLASM OF LOWER-INNER QUADRANT OF LEFT BREAST IN FEMALE, ESTROGEN RECEPTOR POSITIVE (H): ICD-10-CM

## 2018-03-21 ENCOUNTER — COMMUNICATION - HEALTHEAST (OUTPATIENT)
Dept: INTERNAL MEDICINE | Facility: CLINIC | Age: 83
End: 2018-03-21

## 2018-03-30 ENCOUNTER — COMMUNICATION - HEALTHEAST (OUTPATIENT)
Dept: ANTICOAGULATION | Facility: CLINIC | Age: 83
End: 2018-03-30

## 2018-03-30 ENCOUNTER — AMBULATORY - HEALTHEAST (OUTPATIENT)
Dept: LAB | Facility: CLINIC | Age: 83
End: 2018-03-30

## 2018-03-30 DIAGNOSIS — Z86.718 HISTORY OF DVT (DEEP VEIN THROMBOSIS): ICD-10-CM

## 2018-03-30 DIAGNOSIS — D68.59 PRIMARY HYPERCOAGULABLE STATE (H): ICD-10-CM

## 2018-03-30 LAB — INR PPP: 3.1 (ref 0.9–1.1)

## 2018-04-07 ENCOUNTER — COMMUNICATION - HEALTHEAST (OUTPATIENT)
Dept: INTERNAL MEDICINE | Facility: CLINIC | Age: 83
End: 2018-04-07

## 2018-04-07 DIAGNOSIS — D50.9 IRON DEFICIENCY ANEMIA: ICD-10-CM

## 2018-04-17 ENCOUNTER — COMMUNICATION - HEALTHEAST (OUTPATIENT)
Dept: ANTICOAGULATION | Facility: CLINIC | Age: 83
End: 2018-04-17

## 2018-04-17 ENCOUNTER — AMBULATORY - HEALTHEAST (OUTPATIENT)
Dept: LAB | Facility: CLINIC | Age: 83
End: 2018-04-17

## 2018-04-17 DIAGNOSIS — D68.59 PRIMARY HYPERCOAGULABLE STATE (H): ICD-10-CM

## 2018-04-17 DIAGNOSIS — Z86.718 HISTORY OF DVT (DEEP VEIN THROMBOSIS): ICD-10-CM

## 2018-04-17 LAB — INR PPP: 3.4 (ref 0.9–1.1)

## 2018-04-19 ENCOUNTER — COMMUNICATION - HEALTHEAST (OUTPATIENT)
Dept: SURGERY | Facility: CLINIC | Age: 83
End: 2018-04-19

## 2018-04-19 DIAGNOSIS — C50.312 MALIGNANT NEOPLASM OF LOWER-INNER QUADRANT OF LEFT FEMALE BREAST (H): ICD-10-CM

## 2018-04-24 ENCOUNTER — AMBULATORY - HEALTHEAST (OUTPATIENT)
Dept: SURGERY | Facility: CLINIC | Age: 83
End: 2018-04-24

## 2018-04-24 DIAGNOSIS — C50.312 MALIGNANT NEOPLASM OF LOWER-INNER QUADRANT OF LEFT BREAST IN FEMALE, ESTROGEN RECEPTOR POSITIVE (H): ICD-10-CM

## 2018-04-24 DIAGNOSIS — Z17.0 MALIGNANT NEOPLASM OF LOWER-INNER QUADRANT OF LEFT BREAST IN FEMALE, ESTROGEN RECEPTOR POSITIVE (H): ICD-10-CM

## 2018-05-01 ENCOUNTER — COMMUNICATION - HEALTHEAST (OUTPATIENT)
Dept: ANTICOAGULATION | Facility: CLINIC | Age: 83
End: 2018-05-01

## 2018-05-01 ENCOUNTER — AMBULATORY - HEALTHEAST (OUTPATIENT)
Dept: LAB | Facility: CLINIC | Age: 83
End: 2018-05-01

## 2018-05-01 DIAGNOSIS — D68.59 PRIMARY HYPERCOAGULABLE STATE (H): ICD-10-CM

## 2018-05-01 DIAGNOSIS — Z86.718 HISTORY OF DVT (DEEP VEIN THROMBOSIS): ICD-10-CM

## 2018-05-01 LAB — INR PPP: 2.6 (ref 0.9–1.1)

## 2018-05-10 ENCOUNTER — COMMUNICATION - HEALTHEAST (OUTPATIENT)
Dept: INTERNAL MEDICINE | Facility: CLINIC | Age: 83
End: 2018-05-10

## 2018-05-10 DIAGNOSIS — E78.5 HYPERLIPEMIA: ICD-10-CM

## 2018-05-15 ENCOUNTER — COMMUNICATION - HEALTHEAST (OUTPATIENT)
Dept: ANTICOAGULATION | Facility: CLINIC | Age: 83
End: 2018-05-15

## 2018-05-15 ENCOUNTER — AMBULATORY - HEALTHEAST (OUTPATIENT)
Dept: LAB | Facility: CLINIC | Age: 83
End: 2018-05-15

## 2018-05-15 DIAGNOSIS — Z86.718 HISTORY OF DVT (DEEP VEIN THROMBOSIS): ICD-10-CM

## 2018-05-15 DIAGNOSIS — D68.59 PRIMARY HYPERCOAGULABLE STATE (H): ICD-10-CM

## 2018-05-15 LAB — INR PPP: 3.3 (ref 0.9–1.1)

## 2018-05-18 ENCOUNTER — AMBULATORY - HEALTHEAST (OUTPATIENT)
Dept: SURGERY | Facility: CLINIC | Age: 83
End: 2018-05-18

## 2018-05-18 ENCOUNTER — COMMUNICATION - HEALTHEAST (OUTPATIENT)
Dept: SURGERY | Facility: CLINIC | Age: 83
End: 2018-05-18

## 2018-05-18 DIAGNOSIS — C50.919 BREAST CANCER (H): ICD-10-CM

## 2018-05-18 DIAGNOSIS — Z17.0 MALIGNANT NEOPLASM OF LOWER-INNER QUADRANT OF LEFT BREAST IN FEMALE, ESTROGEN RECEPTOR POSITIVE (H): ICD-10-CM

## 2018-05-18 DIAGNOSIS — C50.312 MALIGNANT NEOPLASM OF LOWER-INNER QUADRANT OF LEFT BREAST IN FEMALE, ESTROGEN RECEPTOR POSITIVE (H): ICD-10-CM

## 2018-05-22 ENCOUNTER — HOSPITAL ENCOUNTER (OUTPATIENT)
Dept: SURGERY | Facility: CLINIC | Age: 83
Discharge: HOME | End: 2018-05-22
Attending: SPECIALIST

## 2018-05-22 DIAGNOSIS — C50.312 MALIGNANT NEOPLASM OF LOWER-INNER QUADRANT OF LEFT BREAST IN FEMALE, ESTROGEN RECEPTOR POSITIVE (H): ICD-10-CM

## 2018-05-22 DIAGNOSIS — Z17.0 MALIGNANT NEOPLASM OF LOWER-INNER QUADRANT OF LEFT BREAST IN FEMALE, ESTROGEN RECEPTOR POSITIVE (H): ICD-10-CM

## 2018-05-22 ASSESSMENT — MIFFLIN-ST. JEOR: SCORE: 954.94

## 2018-05-23 ENCOUNTER — COMMUNICATION - HEALTHEAST (OUTPATIENT)
Dept: INTERNAL MEDICINE | Facility: CLINIC | Age: 83
End: 2018-05-23

## 2018-05-23 DIAGNOSIS — Z86.718 HISTORY OF DVT (DEEP VEIN THROMBOSIS): ICD-10-CM

## 2018-06-01 ENCOUNTER — OFFICE VISIT - HEALTHEAST (OUTPATIENT)
Dept: INTERNAL MEDICINE | Facility: CLINIC | Age: 83
End: 2018-06-01

## 2018-06-01 ENCOUNTER — COMMUNICATION - HEALTHEAST (OUTPATIENT)
Dept: ANTICOAGULATION | Facility: CLINIC | Age: 83
End: 2018-06-01

## 2018-06-01 DIAGNOSIS — Z86.718 HISTORY OF DVT (DEEP VEIN THROMBOSIS): ICD-10-CM

## 2018-06-01 DIAGNOSIS — Z01.818 PREOP EXAM FOR INTERNAL MEDICINE: ICD-10-CM

## 2018-06-01 DIAGNOSIS — D68.59 PRIMARY HYPERCOAGULABLE STATE (H): ICD-10-CM

## 2018-06-01 DIAGNOSIS — I87.009 POSTPHLEBITIC SYNDROME: ICD-10-CM

## 2018-06-01 LAB
ALBUMIN UR-MCNC: NEGATIVE MG/DL
ANION GAP SERPL CALCULATED.3IONS-SCNC: 11 MMOL/L (ref 5–18)
APPEARANCE UR: ABNORMAL
BACTERIA #/AREA URNS HPF: ABNORMAL HPF
BILIRUB UR QL STRIP: NEGATIVE
BUN SERPL-MCNC: 41 MG/DL (ref 8–28)
CALCIUM SERPL-MCNC: 9.9 MG/DL (ref 8.5–10.5)
CHLORIDE BLD-SCNC: 109 MMOL/L (ref 98–107)
CO2 SERPL-SCNC: 22 MMOL/L (ref 22–31)
COLOR UR AUTO: YELLOW
CREAT SERPL-MCNC: 1.38 MG/DL (ref 0.6–1.1)
ERYTHROCYTE [DISTWIDTH] IN BLOOD BY AUTOMATED COUNT: 12.6 % (ref 11–14.5)
GFR SERPL CREATININE-BSD FRML MDRD: 36 ML/MIN/1.73M2
GLUCOSE BLD-MCNC: 84 MG/DL (ref 70–125)
GLUCOSE UR STRIP-MCNC: NEGATIVE MG/DL
HCT VFR BLD AUTO: 26.7 % (ref 35–47)
HGB BLD-MCNC: 9.2 G/DL (ref 12–16)
HGB UR QL STRIP: NEGATIVE
INR PPP: 3.2 (ref 0.9–1.1)
KETONES UR STRIP-MCNC: NEGATIVE MG/DL
LEUKOCYTE ESTERASE UR QL STRIP: ABNORMAL
MCH RBC QN AUTO: 32.8 PG (ref 27–34)
MCHC RBC AUTO-ENTMCNC: 34.6 G/DL (ref 32–36)
MCV RBC AUTO: 95 FL (ref 80–100)
MUCOUS THREADS #/AREA URNS LPF: ABNORMAL LPF
NITRATE UR QL: NEGATIVE
PH UR STRIP: 5.5 [PH] (ref 4.5–8)
PLATELET # BLD AUTO: 161 THOU/UL (ref 140–440)
PMV BLD AUTO: 7.7 FL (ref 7–10)
POTASSIUM BLD-SCNC: 5 MMOL/L (ref 3.5–5)
RBC # BLD AUTO: 2.82 MILL/UL (ref 3.8–5.4)
RBC #/AREA URNS AUTO: ABNORMAL HPF
SODIUM SERPL-SCNC: 142 MMOL/L (ref 136–145)
SP GR UR STRIP: 1.01 (ref 1–1.03)
SQUAMOUS #/AREA URNS AUTO: ABNORMAL LPF
UROBILINOGEN UR STRIP-ACNC: ABNORMAL
WBC #/AREA URNS AUTO: ABNORMAL HPF
WBC CLUMPS #/AREA URNS HPF: PRESENT /[HPF]
WBC: 4.6 THOU/UL (ref 4–11)

## 2018-06-01 ASSESSMENT — MIFFLIN-ST. JEOR: SCORE: 951.54

## 2018-06-02 LAB
ATRIAL RATE - MUSE: 87 BPM
DIASTOLIC BLOOD PRESSURE - MUSE: NORMAL MMHG
INTERPRETATION ECG - MUSE: NORMAL
P AXIS - MUSE: 80 DEGREES
PR INTERVAL - MUSE: 180 MS
QRS DURATION - MUSE: 82 MS
QT - MUSE: 350 MS
QTC - MUSE: 421 MS
R AXIS - MUSE: 1 DEGREES
SYSTOLIC BLOOD PRESSURE - MUSE: NORMAL MMHG
T AXIS - MUSE: 32 DEGREES
VENTRICULAR RATE- MUSE: 87 BPM

## 2018-06-03 LAB — BACTERIA SPEC CULT: ABNORMAL

## 2018-06-04 ENCOUNTER — COMMUNICATION - HEALTHEAST (OUTPATIENT)
Dept: INTERNAL MEDICINE | Facility: CLINIC | Age: 83
End: 2018-06-04

## 2018-06-04 DIAGNOSIS — N39.0 UTI (URINARY TRACT INFECTION): ICD-10-CM

## 2018-06-06 ENCOUNTER — HOSPITAL ENCOUNTER (EMERGENCY)
Facility: CLINIC | Age: 83
End: 2018-06-06
Payer: COMMERCIAL

## 2018-06-07 ENCOUNTER — ANESTHESIA - HEALTHEAST (OUTPATIENT)
Dept: SURGERY | Facility: AMBULATORY SURGERY CENTER | Age: 83
End: 2018-06-07

## 2018-06-08 ENCOUNTER — SURGERY - HEALTHEAST (OUTPATIENT)
Dept: SURGERY | Facility: AMBULATORY SURGERY CENTER | Age: 83
End: 2018-06-08

## 2018-06-08 ASSESSMENT — MIFFLIN-ST. JEOR: SCORE: 954.94

## 2018-06-14 ENCOUNTER — RECORDS - HEALTHEAST (OUTPATIENT)
Dept: ADMINISTRATIVE | Facility: OTHER | Age: 83
End: 2018-06-14

## 2018-06-15 ENCOUNTER — HOSPITAL ENCOUNTER (OUTPATIENT)
Dept: SURGERY | Facility: CLINIC | Age: 83
Discharge: HOME | End: 2018-06-15
Attending: SPECIALIST

## 2018-06-15 ENCOUNTER — COMMUNICATION - HEALTHEAST (OUTPATIENT)
Dept: ANTICOAGULATION | Facility: CLINIC | Age: 83
End: 2018-06-15

## 2018-06-15 DIAGNOSIS — Z48.89 POSTOPERATIVE VISIT: ICD-10-CM

## 2018-06-15 DIAGNOSIS — Z86.718 HISTORY OF DVT (DEEP VEIN THROMBOSIS): ICD-10-CM

## 2018-06-15 DIAGNOSIS — C50.312 MALIGNANT NEOPLASM OF LOWER-INNER QUADRANT OF LEFT BREAST IN FEMALE, ESTROGEN RECEPTOR POSITIVE (H): ICD-10-CM

## 2018-06-15 DIAGNOSIS — Z17.0 MALIGNANT NEOPLASM OF LOWER-INNER QUADRANT OF LEFT BREAST IN FEMALE, ESTROGEN RECEPTOR POSITIVE (H): ICD-10-CM

## 2018-06-15 ASSESSMENT — MIFFLIN-ST. JEOR: SCORE: 954.94

## 2018-06-22 ENCOUNTER — COMMUNICATION - HEALTHEAST (OUTPATIENT)
Dept: ANTICOAGULATION | Facility: CLINIC | Age: 83
End: 2018-06-22

## 2018-06-22 ENCOUNTER — AMBULATORY - HEALTHEAST (OUTPATIENT)
Dept: LAB | Facility: CLINIC | Age: 83
End: 2018-06-22

## 2018-06-22 DIAGNOSIS — D68.59 PRIMARY HYPERCOAGULABLE STATE (H): ICD-10-CM

## 2018-06-22 DIAGNOSIS — Z86.718 HISTORY OF DVT (DEEP VEIN THROMBOSIS): ICD-10-CM

## 2018-06-22 LAB — INR PPP: 2.1 (ref 0.9–1.1)

## 2018-07-06 ENCOUNTER — AMBULATORY - HEALTHEAST (OUTPATIENT)
Dept: LAB | Facility: CLINIC | Age: 83
End: 2018-07-06

## 2018-07-06 ENCOUNTER — COMMUNICATION - HEALTHEAST (OUTPATIENT)
Dept: ANTICOAGULATION | Facility: CLINIC | Age: 83
End: 2018-07-06

## 2018-07-06 DIAGNOSIS — Z86.718 HISTORY OF DVT (DEEP VEIN THROMBOSIS): ICD-10-CM

## 2018-07-06 DIAGNOSIS — D68.59 PRIMARY HYPERCOAGULABLE STATE (H): ICD-10-CM

## 2018-07-06 LAB — INR PPP: 2.5 (ref 0.9–1.1)

## 2018-07-16 ENCOUNTER — OFFICE VISIT - HEALTHEAST (OUTPATIENT)
Dept: INTERNAL MEDICINE | Facility: CLINIC | Age: 83
End: 2018-07-16

## 2018-07-16 DIAGNOSIS — M81.0 AGE-RELATED OSTEOPOROSIS WITHOUT CURRENT PATHOLOGICAL FRACTURE: ICD-10-CM

## 2018-07-16 DIAGNOSIS — D50.9 ANEMIA, IRON DEFICIENCY: ICD-10-CM

## 2018-07-16 LAB
ANION GAP SERPL CALCULATED.3IONS-SCNC: 9 MMOL/L (ref 5–18)
BUN SERPL-MCNC: 41 MG/DL (ref 8–28)
CALCIUM SERPL-MCNC: 9.3 MG/DL (ref 8.5–10.5)
CHLORIDE BLD-SCNC: 109 MMOL/L (ref 98–107)
CO2 SERPL-SCNC: 24 MMOL/L (ref 22–31)
CREAT SERPL-MCNC: 1.6 MG/DL (ref 0.6–1.1)
ERYTHROCYTE [DISTWIDTH] IN BLOOD BY AUTOMATED COUNT: 12.6 % (ref 11–14.5)
FERRITIN SERPL-MCNC: 22 NG/ML (ref 10–130)
GFR SERPL CREATININE-BSD FRML MDRD: 30 ML/MIN/1.73M2
GLUCOSE BLD-MCNC: 73 MG/DL (ref 70–125)
HCT VFR BLD AUTO: 26.1 % (ref 35–47)
HGB BLD-MCNC: 8.7 G/DL (ref 12–16)
IRON SATN MFR SERPL: 22 % (ref 20–50)
IRON SERPL-MCNC: 75 UG/DL (ref 42–175)
MCH RBC QN AUTO: 31.4 PG (ref 27–34)
MCHC RBC AUTO-ENTMCNC: 33.4 G/DL (ref 32–36)
MCV RBC AUTO: 94 FL (ref 80–100)
PLATELET # BLD AUTO: 195 THOU/UL (ref 140–440)
PMV BLD AUTO: 7.8 FL (ref 7–10)
POTASSIUM BLD-SCNC: 5 MMOL/L (ref 3.5–5)
RBC # BLD AUTO: 2.76 MILL/UL (ref 3.8–5.4)
SODIUM SERPL-SCNC: 142 MMOL/L (ref 136–145)
TIBC SERPL-MCNC: 337 UG/DL (ref 313–563)
TRANSFERRIN SERPL-MCNC: 269 MG/DL (ref 212–360)
WBC: 5.6 THOU/UL (ref 4–11)

## 2018-07-17 ENCOUNTER — COMMUNICATION - HEALTHEAST (OUTPATIENT)
Dept: INTERNAL MEDICINE | Facility: CLINIC | Age: 83
End: 2018-07-17

## 2018-08-03 ENCOUNTER — COMMUNICATION - HEALTHEAST (OUTPATIENT)
Dept: ANTICOAGULATION | Facility: CLINIC | Age: 83
End: 2018-08-03

## 2018-08-03 ENCOUNTER — AMBULATORY - HEALTHEAST (OUTPATIENT)
Dept: LAB | Facility: CLINIC | Age: 83
End: 2018-08-03

## 2018-08-03 DIAGNOSIS — Z86.718 HISTORY OF DVT (DEEP VEIN THROMBOSIS): ICD-10-CM

## 2018-08-03 DIAGNOSIS — D68.59 PRIMARY HYPERCOAGULABLE STATE (H): ICD-10-CM

## 2018-08-03 LAB — INR PPP: 2.7 (ref 0.9–1.1)

## 2018-08-31 ENCOUNTER — COMMUNICATION - HEALTHEAST (OUTPATIENT)
Dept: ANTICOAGULATION | Facility: CLINIC | Age: 83
End: 2018-08-31

## 2018-08-31 ENCOUNTER — AMBULATORY - HEALTHEAST (OUTPATIENT)
Dept: LAB | Facility: CLINIC | Age: 83
End: 2018-08-31

## 2018-08-31 ENCOUNTER — COMMUNICATION - HEALTHEAST (OUTPATIENT)
Dept: LAB | Facility: CLINIC | Age: 83
End: 2018-08-31

## 2018-08-31 DIAGNOSIS — Z86.718 HISTORY OF DVT (DEEP VEIN THROMBOSIS): ICD-10-CM

## 2018-08-31 DIAGNOSIS — D68.59 PRIMARY HYPERCOAGULABLE STATE (H): ICD-10-CM

## 2018-08-31 DIAGNOSIS — D50.9 ANEMIA, IRON DEFICIENCY: ICD-10-CM

## 2018-08-31 DIAGNOSIS — D64.9 ANEMIA: ICD-10-CM

## 2018-08-31 LAB
ERYTHROCYTE [DISTWIDTH] IN BLOOD BY AUTOMATED COUNT: 12.8 % (ref 11–14.5)
HCT VFR BLD AUTO: 22.9 % (ref 35–47)
HGB BLD-MCNC: 7.7 G/DL (ref 12–16)
INR PPP: 2.9 (ref 0.9–1.1)
MCH RBC QN AUTO: 31.4 PG (ref 27–34)
MCHC RBC AUTO-ENTMCNC: 33.4 G/DL (ref 32–36)
MCV RBC AUTO: 94 FL (ref 80–100)
PLATELET # BLD AUTO: 173 THOU/UL (ref 140–440)
PMV BLD AUTO: 8.6 FL (ref 7–10)
RBC # BLD AUTO: 2.44 MILL/UL (ref 3.8–5.4)
WBC: 3.6 THOU/UL (ref 4–11)

## 2018-11-23 NOTE — LETTER
[facility]   [facility]   , [facility]          Please give to    From:  Christina Skinner  [facility]  Care Coordinator [phone]   [facility]   P: [phone]  [email or web address]   Patient Name:  Graciela Hopkins YOB: 1925   Admit date: 11-      *Information Needed:  Please contact me when the patient will discharge (or if they will move to long term care)- include the discharge date, disposition, and main diagnosis   - If the patient is discharged with home care services, please provide the name of the agency    Phone or Email with information  Thank you, Christina  P: [phone]  [email or web address]

## 2018-11-26 NOTE — PROGRESS NOTES
Clinic Care Coordination Contact  Care Coordination Communication    Referral Source: SNF/TCU    Clinical Data: Patient was hospitalized at [facility]. Admission Date: 11/2/2018. Admission Diagnoses: Iron deficiency anemia [D50.9]   Discharge Date: November 7, 2018  Disposition: Transitional care- [facility]  Condition at Discharge: Good  Code Status: DNR                Principal Diagnosis:  Iron deficiency anemia     Discharge Diagnoses:  Principal Problem:    Iron deficiency anemia  Active Problems:    Chronic Reflux Esophagitis    Prothrombin Gene Mutation    England's esophagus    Essential hypertension    Chronic Renal Insufficiency    History of DVT (deep vein thrombosis)    Gastrointestinal hemorrhage with melena    Anemia due to blood loss, acute    Gastric antral vascular ectasia   (watermelon stomach)  Transient confusion  Consult/s: GI  Significant Diagnostic Studies: endoscopy: colonoscopy: Revealed [place] diverticuli and several small polyps and Upper GI endoscopy revealed gastric antral vascular ectasia and findings consistent with England's esophagus    She was in same TCU as her .  Both were discharged home with home care.   Home Care Contact:              Home Care Agency: [facility].                         Care Coordination contacted home care: Yes              Anticipated start of care date: Already started.      Plan: RN/SW Care Coordinator will await notification from home care staff informing RN/SW Care Coordinator of patients discharge plans/needs. RN/SW Care Coordinator will review chart and outreach to home care every 4 weeks and as needed.      Christina Skinner,   [facility]  [email or web address]  [phone]

## 2019-01-01 ENCOUNTER — HOSPITAL ENCOUNTER (OUTPATIENT)
Dept: SURGERY | Facility: CLINIC | Age: 84
Discharge: HOME | End: 2019-05-28
Attending: SPECIALIST

## 2019-01-01 ENCOUNTER — COMMUNICATION - HEALTHEAST (OUTPATIENT)
Dept: ANTICOAGULATION | Facility: CLINIC | Age: 84
End: 2019-01-01

## 2019-01-01 ENCOUNTER — COMMUNICATION - HEALTHEAST (OUTPATIENT)
Dept: INTERNAL MEDICINE | Facility: CLINIC | Age: 84
End: 2019-01-01

## 2019-01-01 ENCOUNTER — APPOINTMENT (OUTPATIENT)
Dept: OCCUPATIONAL THERAPY | Facility: CLINIC | Age: 84
DRG: 311 | End: 2019-01-01
Attending: STUDENT IN AN ORGANIZED HEALTH CARE EDUCATION/TRAINING PROGRAM
Payer: COMMERCIAL

## 2019-01-01 ENCOUNTER — AMBULATORY - HEALTHEAST (OUTPATIENT)
Dept: LAB | Facility: CLINIC | Age: 84
End: 2019-01-01

## 2019-01-01 ENCOUNTER — APPOINTMENT (OUTPATIENT)
Dept: CARDIOLOGY | Facility: CLINIC | Age: 84
DRG: 311 | End: 2019-01-01
Attending: STUDENT IN AN ORGANIZED HEALTH CARE EDUCATION/TRAINING PROGRAM
Payer: COMMERCIAL

## 2019-01-01 ENCOUNTER — RECORDS - HEALTHEAST (OUTPATIENT)
Dept: ADMINISTRATIVE | Facility: OTHER | Age: 84
End: 2019-01-01

## 2019-01-01 ENCOUNTER — AMBULATORY - HEALTHEAST (OUTPATIENT)
Dept: OTHER | Facility: CLINIC | Age: 84
End: 2019-01-01

## 2019-01-01 ENCOUNTER — OFFICE VISIT - HEALTHEAST (OUTPATIENT)
Dept: INTERNAL MEDICINE | Facility: CLINIC | Age: 84
End: 2019-01-01

## 2019-01-01 ENCOUNTER — RECORDS - HEALTHEAST (OUTPATIENT)
Dept: INTERNAL MEDICINE | Facility: CLINIC | Age: 84
End: 2019-01-01

## 2019-01-01 ENCOUNTER — APPOINTMENT (OUTPATIENT)
Dept: OCCUPATIONAL THERAPY | Facility: CLINIC | Age: 84
DRG: 689 | End: 2019-01-01
Payer: COMMERCIAL

## 2019-01-01 ENCOUNTER — APPOINTMENT (OUTPATIENT)
Dept: LAB | Facility: CLINIC | Age: 84
End: 2019-01-01
Attending: INTERNAL MEDICINE
Payer: COMMERCIAL

## 2019-01-01 ENCOUNTER — COMMUNICATION - HEALTHEAST (OUTPATIENT)
Dept: SURGERY | Facility: CLINIC | Age: 84
End: 2019-01-01

## 2019-01-01 ENCOUNTER — COMMUNICATION - HEALTHEAST (OUTPATIENT)
Dept: SCHEDULING | Facility: CLINIC | Age: 84
End: 2019-01-01

## 2019-01-01 ENCOUNTER — HOME INFUSION (PRE-WILLOW HOME INFUSION) (OUTPATIENT)
Dept: PHARMACY | Facility: CLINIC | Age: 84
End: 2019-01-01

## 2019-01-01 ENCOUNTER — HOSPITAL ENCOUNTER (INPATIENT)
Facility: CLINIC | Age: 84
LOS: 1 days | Discharge: HOME | DRG: 311 | End: 2019-06-24
Attending: EMERGENCY MEDICINE | Admitting: INTERNAL MEDICINE
Payer: COMMERCIAL

## 2019-01-01 ENCOUNTER — HOME CARE/HOSPICE - HEALTHEAST (OUTPATIENT)
Dept: HOME HEALTH SERVICES | Facility: HOME HEALTH | Age: 84
End: 2019-01-01

## 2019-01-01 ENCOUNTER — AMBULATORY - HEALTHEAST (OUTPATIENT)
Dept: SURGERY | Facility: CLINIC | Age: 84
End: 2019-01-01

## 2019-01-01 ENCOUNTER — PATIENT OUTREACH (OUTPATIENT)
Dept: CARE COORDINATION | Facility: CLINIC | Age: 84
End: 2019-01-01

## 2019-01-01 ENCOUNTER — COMMUNICATION - HEALTHEAST (OUTPATIENT)
Dept: CARE COORDINATION | Facility: CLINIC | Age: 84
End: 2019-01-01

## 2019-01-01 ENCOUNTER — MEDICAL CORRESPONDENCE (OUTPATIENT)
Dept: HEALTH INFORMATION MANAGEMENT | Facility: CLINIC | Age: 84
End: 2019-01-01

## 2019-01-01 ENCOUNTER — APPOINTMENT (OUTPATIENT)
Dept: CT IMAGING | Facility: CLINIC | Age: 84
DRG: 311 | End: 2019-01-01
Attending: EMERGENCY MEDICINE
Payer: COMMERCIAL

## 2019-01-01 ENCOUNTER — HOSPITAL ENCOUNTER (INPATIENT)
Facility: CLINIC | Age: 84
LOS: 3 days | Discharge: HOME | DRG: 689 | End: 2019-05-20
Attending: FAMILY MEDICINE | Admitting: HOSPITALIST
Payer: COMMERCIAL

## 2019-01-01 ENCOUNTER — DOCUMENTATION ONLY (OUTPATIENT)
Dept: OTHER | Facility: CLINIC | Age: 84
End: 2019-01-01

## 2019-01-01 ENCOUNTER — APPOINTMENT (OUTPATIENT)
Dept: GENERAL RADIOLOGY | Facility: CLINIC | Age: 84
DRG: 311 | End: 2019-01-01
Attending: EMERGENCY MEDICINE
Payer: COMMERCIAL

## 2019-01-01 ENCOUNTER — AMBULATORY - HEALTHEAST (OUTPATIENT)
Dept: INTERNAL MEDICINE | Facility: CLINIC | Age: 84
End: 2019-01-01

## 2019-01-01 ENCOUNTER — AMBULATORY - HEALTHEAST (OUTPATIENT)
Dept: NURSING | Facility: CLINIC | Age: 84
End: 2019-01-01

## 2019-01-01 VITALS
HEART RATE: 84 BPM | HEIGHT: 61 IN | SYSTOLIC BLOOD PRESSURE: 114 MMHG | RESPIRATION RATE: 16 BRPM | DIASTOLIC BLOOD PRESSURE: 55 MMHG | WEIGHT: 117.4 LBS | BODY MASS INDEX: 22.16 KG/M2 | OXYGEN SATURATION: 91 % | TEMPERATURE: 97.5 F

## 2019-01-01 VITALS
WEIGHT: 119.3 LBS | DIASTOLIC BLOOD PRESSURE: 40 MMHG | HEIGHT: 60 IN | SYSTOLIC BLOOD PRESSURE: 104 MMHG | OXYGEN SATURATION: 98 % | BODY MASS INDEX: 23.42 KG/M2 | TEMPERATURE: 97.5 F | HEART RATE: 99 BPM | RESPIRATION RATE: 16 BRPM

## 2019-01-01 DIAGNOSIS — C50.919 MALIGNANT NEOPLASM OF BREAST IN FEMALE, ESTROGEN RECEPTOR POSITIVE, UNSPECIFIED LATERALITY, UNSPECIFIED SITE OF BREAST (H): ICD-10-CM

## 2019-01-01 DIAGNOSIS — E61.1 IRON DEFICIENCY: ICD-10-CM

## 2019-01-01 DIAGNOSIS — R42 DIZZINESS: ICD-10-CM

## 2019-01-01 DIAGNOSIS — I82.4Z2 ACUTE VENOUS EMBOLISM AND THROMBOSIS OF DEEP VESSELS OF DISTAL END OF LEFT LOWER EXTREMITY (H): ICD-10-CM

## 2019-01-01 DIAGNOSIS — E87.5 HYPERKALEMIA: ICD-10-CM

## 2019-01-01 DIAGNOSIS — D50.0 IRON DEFICIENCY ANEMIA DUE TO CHRONIC BLOOD LOSS: ICD-10-CM

## 2019-01-01 DIAGNOSIS — N18.30 CHRONIC RENAL INSUFFICIENCY, STAGE 3 (MODERATE) (H): ICD-10-CM

## 2019-01-01 DIAGNOSIS — C50.312 MALIGNANT NEOPLASM OF LOWER-INNER QUADRANT OF LEFT BREAST IN FEMALE, ESTROGEN RECEPTOR POSITIVE (H): ICD-10-CM

## 2019-01-01 DIAGNOSIS — K21.00 GASTROESOPHAGEAL REFLUX DISEASE WITH ESOPHAGITIS: ICD-10-CM

## 2019-01-01 DIAGNOSIS — K21.9 GASTROESOPHAGEAL REFLUX DISEASE WITHOUT ESOPHAGITIS: ICD-10-CM

## 2019-01-01 DIAGNOSIS — D50.0 CHRONIC BLOOD LOSS ANEMIA: ICD-10-CM

## 2019-01-01 DIAGNOSIS — E86.0 DEHYDRATION: ICD-10-CM

## 2019-01-01 DIAGNOSIS — Q78.2 OSTEOPETROSIS: ICD-10-CM
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
## 2019-01-01 DIAGNOSIS — N30.90 RECURRENT CYSTITIS: ICD-10-CM

## 2019-01-01 DIAGNOSIS — Z51.81 ENCOUNTER FOR THERAPEUTIC DRUG MONITORING: ICD-10-CM

## 2019-01-01 DIAGNOSIS — Z17.0 MALIGNANT NEOPLASM OF LOWER-INNER QUADRANT OF LEFT BREAST IN FEMALE, ESTROGEN RECEPTOR POSITIVE (H): ICD-10-CM

## 2019-01-01 DIAGNOSIS — E44.0 MODERATE PROTEIN-CALORIE MALNUTRITION (H): ICD-10-CM

## 2019-01-01 DIAGNOSIS — I24.9 ACS (ACUTE CORONARY SYNDROME) (H): ICD-10-CM

## 2019-01-01 DIAGNOSIS — I10 ESSENTIAL HYPERTENSION: ICD-10-CM

## 2019-01-01 DIAGNOSIS — Z85.3 PERSONAL HISTORY OF BREAST CANCER: ICD-10-CM

## 2019-01-01 DIAGNOSIS — E83.42 HYPOMAGNESEMIA: ICD-10-CM

## 2019-01-01 DIAGNOSIS — N18.30 CHRONIC RENAL INSUFFICIENCY, STAGE III (MODERATE) (H): ICD-10-CM

## 2019-01-01 DIAGNOSIS — N18.9 CHRONIC KIDNEY DISEASE, UNSPECIFIED CKD STAGE: ICD-10-CM

## 2019-01-01 DIAGNOSIS — N30.00 ACUTE CYSTITIS WITHOUT HEMATURIA: ICD-10-CM

## 2019-01-01 DIAGNOSIS — F32.A DEPRESSION: ICD-10-CM

## 2019-01-01 DIAGNOSIS — D68.52 PROTHROMBIN GENE MUTATION (H): ICD-10-CM

## 2019-01-01 DIAGNOSIS — D62 ANEMIA DUE TO BLOOD LOSS, ACUTE: ICD-10-CM

## 2019-01-01 DIAGNOSIS — N60.02 CYST OF LEFT BREAST: ICD-10-CM

## 2019-01-01 DIAGNOSIS — I82.409 ACUTE DEEP VEIN THROMBOSIS (DVT) OF LOWER EXTREMITY, UNSPECIFIED LATERALITY, UNSPECIFIED VEIN (H): Primary | ICD-10-CM

## 2019-01-01 DIAGNOSIS — Z86.718 HISTORY OF DVT (DEEP VEIN THROMBOSIS): ICD-10-CM

## 2019-01-01 DIAGNOSIS — M81.0 OSTEOPOROSIS, UNSPECIFIED OSTEOPOROSIS TYPE, UNSPECIFIED PATHOLOGICAL FRACTURE PRESENCE: ICD-10-CM

## 2019-01-01 DIAGNOSIS — R41.0 CONFUSION: ICD-10-CM

## 2019-01-01 DIAGNOSIS — F43.21 SITUATIONAL DEPRESSION: ICD-10-CM

## 2019-01-01 DIAGNOSIS — N28.9 RENAL INSUFFICIENCY: ICD-10-CM

## 2019-01-01 DIAGNOSIS — E78.00 HYPERCHOLESTEROLEMIA: ICD-10-CM

## 2019-01-01 DIAGNOSIS — D64.9 ANEMIA, UNSPECIFIED TYPE: Primary | ICD-10-CM

## 2019-01-01 DIAGNOSIS — R79.89 TROPONIN LEVEL ELEVATED: ICD-10-CM

## 2019-01-01 DIAGNOSIS — N18.30 STAGE 3 CHRONIC KIDNEY DISEASE (H): ICD-10-CM

## 2019-01-01 DIAGNOSIS — N17.9 ACUTE RENAL FAILURE, UNSPECIFIED ACUTE RENAL FAILURE TYPE (H): ICD-10-CM

## 2019-01-01 DIAGNOSIS — E78.5 HYPERLIPEMIA: ICD-10-CM

## 2019-01-01 DIAGNOSIS — Z17.0 MALIGNANT NEOPLASM OF BREAST IN FEMALE, ESTROGEN RECEPTOR POSITIVE, UNSPECIFIED LATERALITY, UNSPECIFIED SITE OF BREAST (H): ICD-10-CM

## 2019-01-01 DIAGNOSIS — C67.9 MALIGNANT NEOPLASM OF URINARY BLADDER, UNSPECIFIED SITE (H): ICD-10-CM

## 2019-01-01 DIAGNOSIS — E46 MALNUTRITION, UNSPECIFIED TYPE (H): ICD-10-CM

## 2019-01-01 LAB
ABO + RH BLD: NORMAL
ALBUMIN SERPL-MCNC: 3 G/DL (ref 3.4–5)
ALBUMIN SERPL-MCNC: 3.2 G/DL (ref 3.4–5)
ALBUMIN UR-MCNC: 10 MG/DL
ALBUMIN UR-MCNC: 10 MG/DL
ALP SERPL-CCNC: 47 U/L (ref 40–150)
ALP SERPL-CCNC: 52 U/L (ref 40–150)
ALT SERPL W P-5'-P-CCNC: 17 U/L (ref 0–50)
ALT SERPL W P-5'-P-CCNC: 18 U/L (ref 0–50)
ANION GAP SERPL CALCULATED.3IONS-SCNC: 12 MMOL/L (ref 3–14)
ANION GAP SERPL CALCULATED.3IONS-SCNC: 14 MMOL/L (ref 5–18)
ANION GAP SERPL CALCULATED.3IONS-SCNC: 6 MMOL/L (ref 3–14)
ANION GAP SERPL CALCULATED.3IONS-SCNC: 7 MMOL/L (ref 3–14)
ANION GAP SERPL CALCULATED.3IONS-SCNC: 7 MMOL/L (ref 3–14)
ANION GAP SERPL CALCULATED.3IONS-SCNC: 8 MMOL/L (ref 3–14)
ANION GAP SERPL CALCULATED.3IONS-SCNC: 8 MMOL/L (ref 3–14)
ANION GAP SERPL CALCULATED.3IONS-SCNC: 8 MMOL/L (ref 5–18)
ANION GAP SERPL CALCULATED.3IONS-SCNC: 9 MMOL/L (ref 5–18)
APPEARANCE UR: ABNORMAL
APPEARANCE UR: CLEAR
APTT PPP: 39 SEC (ref 22–37)
AST SERPL W P-5'-P-CCNC: 14 U/L (ref 0–45)
AST SERPL W P-5'-P-CCNC: 31 U/L (ref 0–45)
BACTERIA #/AREA URNS HPF: ABNORMAL /HPF
BACTERIA SPEC CULT: ABNORMAL
BACTERIA SPEC CULT: ABNORMAL
BACTERIA SPEC CULT: NORMAL
BASE DEFICIT BLDV-SCNC: 7.4 MMOL/L
BASOPHILS # BLD AUTO: 0 10E9/L (ref 0–0.2)
BASOPHILS # BLD AUTO: 0 10E9/L (ref 0–0.2)
BASOPHILS # BLD AUTO: 0 THOU/UL (ref 0–0.2)
BASOPHILS # BLD AUTO: 0.1 10E9/L (ref 0–0.2)
BASOPHILS NFR BLD AUTO: 0.2 %
BASOPHILS NFR BLD AUTO: 0.2 %
BASOPHILS NFR BLD AUTO: 1 %
BASOPHILS NFR BLD AUTO: 1 % (ref 0–2)
BILIRUB SERPL-MCNC: 0.5 MG/DL (ref 0.2–1.3)
BILIRUB SERPL-MCNC: 0.8 MG/DL (ref 0.2–1.3)
BILIRUB UR QL STRIP: NEGATIVE
BILIRUB UR QL STRIP: NEGATIVE
BLD GP AB SCN SERPL QL: NORMAL
BLD GP AB SCN SERPL QL: NORMAL
BLD PROD TYP BPU: NORMAL
BLD UNIT ID BPU: 0
BLD UNIT ID BPU: 0
BLOOD BANK CMNT PATIENT-IMP: NORMAL
BLOOD BANK CMNT PATIENT-IMP: NORMAL
BLOOD PRODUCT CODE: NORMAL
BLOOD PRODUCT CODE: NORMAL
BPU ID: NORMAL
BPU ID: NORMAL
BUN SERPL-MCNC: 23 MG/DL (ref 8–28)
BUN SERPL-MCNC: 25 MG/DL (ref 7–30)
BUN SERPL-MCNC: 32 MG/DL (ref 8–28)
BUN SERPL-MCNC: 33 MG/DL (ref 7–30)
BUN SERPL-MCNC: 33 MG/DL (ref 8–28)
BUN SERPL-MCNC: 36 MG/DL (ref 7–30)
BUN SERPL-MCNC: 38 MG/DL (ref 7–30)
BUN SERPL-MCNC: 41 MG/DL (ref 7–30)
BUN SERPL-MCNC: 42 MG/DL (ref 7–30)
BUN SERPL-MCNC: 43 MG/DL (ref 7–30)
BUN SERPL-MCNC: 44 MG/DL (ref 7–30)
BUN SERPL-MCNC: 49 MG/DL (ref 7–30)
CA-I BLD-SCNC: 4.9 MG/DL (ref 4.4–5.2)
CALCIUM SERPL-MCNC: 10.5 MG/DL (ref 8.5–10.5)
CALCIUM SERPL-MCNC: 7.6 MG/DL (ref 8.5–10.1)
CALCIUM SERPL-MCNC: 7.6 MG/DL (ref 8.5–10.1)
CALCIUM SERPL-MCNC: 8 MG/DL (ref 8.5–10.1)
CALCIUM SERPL-MCNC: 8.4 MG/DL (ref 8.5–10.1)
CALCIUM SERPL-MCNC: 8.5 MG/DL (ref 8.5–10.1)
CALCIUM SERPL-MCNC: 8.5 MG/DL (ref 8.5–10.1)
CALCIUM SERPL-MCNC: 8.7 MG/DL (ref 8.5–10.1)
CALCIUM SERPL-MCNC: 9 MG/DL (ref 8.5–10.5)
CALCIUM SERPL-MCNC: 9.1 MG/DL (ref 8.5–10.5)
CHLORIDE BLD-SCNC: 100 MMOL/L (ref 98–107)
CHLORIDE BLD-SCNC: 106 MMOL/L (ref 98–107)
CHLORIDE BLD-SCNC: 108 MMOL/L (ref 98–107)
CHLORIDE SERPL-SCNC: 110 MMOL/L (ref 94–109)
CHLORIDE SERPL-SCNC: 111 MMOL/L (ref 94–109)
CHLORIDE SERPL-SCNC: 112 MMOL/L (ref 94–109)
CHLORIDE SERPL-SCNC: 113 MMOL/L (ref 94–109)
CHLORIDE SERPL-SCNC: 113 MMOL/L (ref 94–109)
CHLORIDE SERPL-SCNC: 114 MMOL/L (ref 94–109)
CHLORIDE SERPL-SCNC: 115 MMOL/L (ref 94–109)
CHLORIDE SERPL-SCNC: 116 MMOL/L (ref 94–109)
CHLORIDE SERPL-SCNC: 116 MMOL/L (ref 94–109)
CHOLEST SERPL-MCNC: 159 MG/DL
CK SERPL-CCNC: 71 U/L (ref 30–225)
CK SERPL-CCNC: NORMAL U/L (ref 30–225)
CO2 BLDCOV-SCNC: 21 MMOL/L (ref 21–28)
CO2 SERPL-SCNC: 16 MMOL/L (ref 20–32)
CO2 SERPL-SCNC: 18 MMOL/L (ref 20–32)
CO2 SERPL-SCNC: 18 MMOL/L (ref 20–32)
CO2 SERPL-SCNC: 18 MMOL/L (ref 22–31)
CO2 SERPL-SCNC: 19 MMOL/L (ref 22–31)
CO2 SERPL-SCNC: 20 MMOL/L (ref 20–32)
CO2 SERPL-SCNC: 20 MMOL/L (ref 20–32)
CO2 SERPL-SCNC: 21 MMOL/L (ref 20–32)
CO2 SERPL-SCNC: 21 MMOL/L (ref 20–32)
CO2 SERPL-SCNC: 22 MMOL/L (ref 20–32)
CO2 SERPL-SCNC: 26 MMOL/L (ref 22–31)
COLOR UR AUTO: ABNORMAL
COLOR UR AUTO: ABNORMAL
COPATH REPORT: NORMAL
CREAT SERPL-MCNC: 1.53 MG/DL (ref 0.6–1.1)
CREAT SERPL-MCNC: 1.62 MG/DL (ref 0.52–1.04)
CREAT SERPL-MCNC: 1.65 MG/DL (ref 0.52–1.04)
CREAT SERPL-MCNC: 1.8 MG/DL (ref 0.52–1.04)
CREAT SERPL-MCNC: 1.81 MG/DL (ref 0.6–1.1)
CREAT SERPL-MCNC: 1.88 MG/DL (ref 0.52–1.04)
CREAT SERPL-MCNC: 2.06 MG/DL (ref 0.6–1.1)
CREAT SERPL-MCNC: 2.12 MG/DL (ref 0.52–1.04)
CREAT SERPL-MCNC: 2.12 MG/DL (ref 0.52–1.04)
CREAT SERPL-MCNC: 2.13 MG/DL (ref 0.52–1.04)
CREAT SERPL-MCNC: 2.32 MG/DL (ref 0.52–1.04)
CREAT SERPL-MCNC: 2.35 MG/DL (ref 0.52–1.04)
CRP SERPL-MCNC: 62 MG/L (ref 0–8)
DIFFERENTIAL METHOD BLD: ABNORMAL
DIFFERENTIAL METHOD BLD: ABNORMAL
DIFFERENTIAL METHOD BLD: NORMAL
EOSINOPHIL # BLD AUTO: 0.2 10E9/L (ref 0–0.7)
EOSINOPHIL # BLD AUTO: 0.2 THOU/UL (ref 0–0.4)
EOSINOPHIL # BLD AUTO: 0.3 10E9/L (ref 0–0.7)
EOSINOPHIL # BLD AUTO: 0.3 10E9/L (ref 0–0.7)
EOSINOPHIL NFR BLD AUTO: 2.4 %
EOSINOPHIL NFR BLD AUTO: 2.8 %
EOSINOPHIL NFR BLD AUTO: 4 % (ref 0–6)
EOSINOPHIL NFR BLD AUTO: 5.1 %
ERYTHROCYTE [DISTWIDTH] IN BLOOD BY AUTOMATED COUNT: 11.5 % (ref 11–14.5)
ERYTHROCYTE [DISTWIDTH] IN BLOOD BY AUTOMATED COUNT: 11.6 % (ref 11–14.5)
ERYTHROCYTE [DISTWIDTH] IN BLOOD BY AUTOMATED COUNT: 11.9 % (ref 11–14.5)
ERYTHROCYTE [DISTWIDTH] IN BLOOD BY AUTOMATED COUNT: 14.3 % (ref 10–15)
ERYTHROCYTE [DISTWIDTH] IN BLOOD BY AUTOMATED COUNT: 14.4 % (ref 10–15)
ERYTHROCYTE [DISTWIDTH] IN BLOOD BY AUTOMATED COUNT: 14.5 % (ref 10–15)
ERYTHROCYTE [DISTWIDTH] IN BLOOD BY AUTOMATED COUNT: 14.6 % (ref 10–15)
ERYTHROCYTE [DISTWIDTH] IN BLOOD BY AUTOMATED COUNT: 14.7 % (ref 10–15)
ERYTHROCYTE [DISTWIDTH] IN BLOOD BY AUTOMATED COUNT: 14.7 % (ref 10–15)
ERYTHROCYTE [DISTWIDTH] IN BLOOD BY AUTOMATED COUNT: NORMAL % (ref 10–15)
ERYTHROCYTE [SEDIMENTATION RATE] IN BLOOD BY WESTERGREN METHOD: 53 MM/H (ref 0–30)
FERRITIN SERPL-MCNC: 139 NG/ML (ref 8–252)
FOLATE SERPL-MCNC: 15.4 NG/ML
GFR SERPL CREATININE-BSD FRML MDRD: 17 ML/MIN/{1.73_M2}
GFR SERPL CREATININE-BSD FRML MDRD: 17 ML/MIN/{1.73_M2}
GFR SERPL CREATININE-BSD FRML MDRD: 19 ML/MIN/{1.73_M2}
GFR SERPL CREATININE-BSD FRML MDRD: 22 ML/MIN/1.73M2
GFR SERPL CREATININE-BSD FRML MDRD: 23 ML/MIN/{1.73_M2}
GFR SERPL CREATININE-BSD FRML MDRD: 24 ML/MIN/{1.73_M2}
GFR SERPL CREATININE-BSD FRML MDRD: 26 ML/MIN/1.73M2
GFR SERPL CREATININE-BSD FRML MDRD: 26 ML/MIN/{1.73_M2}
GFR SERPL CREATININE-BSD FRML MDRD: 27 ML/MIN/{1.73_M2}
GFR SERPL CREATININE-BSD FRML MDRD: 32 ML/MIN/1.73M2
GLUCOSE BLD-MCNC: 100 MG/DL (ref 70–125)
GLUCOSE BLD-MCNC: 101 MG/DL (ref 70–125)
GLUCOSE BLD-MCNC: 87 MG/DL (ref 70–125)
GLUCOSE BLD-MCNC: 89 MG/DL (ref 70–99)
GLUCOSE BLDC GLUCOMTR-MCNC: 100 MG/DL (ref 70–99)
GLUCOSE BLDC GLUCOMTR-MCNC: 109 MG/DL (ref 70–99)
GLUCOSE BLDC GLUCOMTR-MCNC: 115 MG/DL (ref 70–99)
GLUCOSE BLDC GLUCOMTR-MCNC: 122 MG/DL (ref 70–99)
GLUCOSE BLDC GLUCOMTR-MCNC: 127 MG/DL (ref 70–99)
GLUCOSE BLDC GLUCOMTR-MCNC: 148 MG/DL (ref 70–99)
GLUCOSE BLDC GLUCOMTR-MCNC: 55 MG/DL (ref 70–99)
GLUCOSE BLDC GLUCOMTR-MCNC: 74 MG/DL (ref 70–99)
GLUCOSE BLDC GLUCOMTR-MCNC: 77 MG/DL (ref 70–99)
GLUCOSE BLDC GLUCOMTR-MCNC: 79 MG/DL (ref 70–99)
GLUCOSE BLDC GLUCOMTR-MCNC: 86 MG/DL (ref 70–99)
GLUCOSE BLDC GLUCOMTR-MCNC: 88 MG/DL (ref 70–99)
GLUCOSE BLDC GLUCOMTR-MCNC: 90 MG/DL (ref 70–99)
GLUCOSE BLDC GLUCOMTR-MCNC: 91 MG/DL (ref 70–99)
GLUCOSE SERPL-MCNC: 55 MG/DL (ref 70–99)
GLUCOSE SERPL-MCNC: 64 MG/DL (ref 70–99)
GLUCOSE SERPL-MCNC: 74 MG/DL (ref 70–99)
GLUCOSE SERPL-MCNC: 85 MG/DL (ref 70–99)
GLUCOSE SERPL-MCNC: 86 MG/DL (ref 70–99)
GLUCOSE SERPL-MCNC: 87 MG/DL (ref 70–99)
GLUCOSE SERPL-MCNC: 89 MG/DL (ref 70–99)
GLUCOSE SERPL-MCNC: 90 MG/DL (ref 70–99)
GLUCOSE SERPL-MCNC: 92 MG/DL (ref 70–99)
GLUCOSE UR STRIP-MCNC: NEGATIVE MG/DL
GLUCOSE UR STRIP-MCNC: NEGATIVE MG/DL
HCO3 BLDV-SCNC: 19 MMOL/L (ref 21–28)
HCT VFR BLD AUTO: 24.1 % (ref 35–47)
HCT VFR BLD AUTO: 24.4 % (ref 35–47)
HCT VFR BLD AUTO: 25.1 % (ref 35–47)
HCT VFR BLD AUTO: 25.2 % (ref 35–47)
HCT VFR BLD AUTO: 25.6 % (ref 35–47)
HCT VFR BLD AUTO: 25.9 % (ref 35–47)
HCT VFR BLD AUTO: 26.4 % (ref 35–47)
HCT VFR BLD AUTO: 32.5 % (ref 35–47)
HCT VFR BLD AUTO: 33 % (ref 35–47)
HCT VFR BLD AUTO: NORMAL % (ref 35–47)
HCT VFR BLD CALC: 22 %PCV (ref 35–47)
HDLC SERPL-MCNC: 42 MG/DL
HGB BLD CALC-MCNC: 7.5 G/DL (ref 11.7–15.7)
HGB BLD-MCNC: 10.9 G/DL (ref 12–16)
HGB BLD-MCNC: 11.1 G/DL (ref 12–16)
HGB BLD-MCNC: 7.4 G/DL (ref 11.7–15.7)
HGB BLD-MCNC: 7.5 G/DL (ref 11.7–15.7)
HGB BLD-MCNC: 7.8 G/DL (ref 11.7–15.7)
HGB BLD-MCNC: 8 G/DL (ref 11.7–15.7)
HGB BLD-MCNC: 8.8 G/DL (ref 12–16)
HGB BLD-MCNC: 9.7 G/DL (ref 12–16)
HGB BLD-MCNC: NORMAL G/DL (ref 11.7–15.7)
HGB UR QL STRIP: NEGATIVE
HGB UR QL STRIP: NEGATIVE
IMM GRANULOCYTES # BLD: 0 10E9/L (ref 0–0.4)
IMM GRANULOCYTES NFR BLD: 0.2 %
IMM GRANULOCYTES NFR BLD: 0.3 %
IMM GRANULOCYTES NFR BLD: 0.3 %
INR PPP: 1.3 (ref 0.9–1.1)
INR PPP: 1.3 (ref 0.9–1.1)
INR PPP: 1.4 (ref 0.9–1.1)
INR PPP: 1.4 (ref 0.9–1.1)
INR PPP: 1.5 (ref 0.9–1.1)
INR PPP: 1.5 (ref 0.9–1.1)
INR PPP: 1.6 (ref 0.9–1.1)
INR PPP: 1.6 (ref 0.9–1.1)
INR PPP: 1.8 (ref 0.9–1.1)
INR PPP: 1.8 (ref 0.9–1.1)
INR PPP: 1.9 (ref 0.86–1.14)
INR PPP: 1.9 (ref 0.9–1.1)
INR PPP: 2.1 (ref 0.9–1.1)
INR PPP: 2.1 (ref 0.9–1.1)
INR PPP: 2.12 (ref 0.86–1.14)
INR PPP: 2.2 (ref 0.9–1.1)
INR PPP: 2.4 (ref 0.86–1.14)
INR PPP: 2.4 (ref 0.9–1.1)
INR PPP: 2.5 (ref 0.9–1.1)
INR PPP: 2.55 (ref 0.86–1.14)
INR PPP: 2.6 (ref 0.9–1.1)
INR PPP: 2.68 (ref 0.86–1.14)
INR PPP: 2.69 (ref 0.86–1.14)
INR PPP: 2.7 (ref 0.9–1.1)
INR PPP: 2.9 (ref 0.86–1.14)
INR PPP: 3 (ref 0.9–1.1)
INR PPP: 3 (ref 0.9–1.1)
INR PPP: 3.4 (ref 0.9–1.1)
INR PPP: 3.4 (ref 0.9–1.1)
INTERPRETATION ECG - MUSE: NORMAL
IRON SATN MFR SERPL: 10 % (ref 15–46)
IRON SATN MFR SERPL: 78 % (ref 20–50)
IRON SERPL-MCNC: 158 UG/DL (ref 42–175)
IRON SERPL-MCNC: 24 UG/DL (ref 35–180)
KETONES UR STRIP-MCNC: NEGATIVE MG/DL
KETONES UR STRIP-MCNC: NEGATIVE MG/DL
LACTATE BLD-SCNC: 0.6 MMOL/L (ref 0.7–2)
LACTATE BLD-SCNC: 1.4 MMOL/L (ref 0.7–2)
LDLC SERPL CALC-MCNC: 89 MG/DL
LEUKOCYTE ESTERASE UR QL STRIP: ABNORMAL
LEUKOCYTE ESTERASE UR QL STRIP: ABNORMAL
LIPASE SERPL-CCNC: 273 U/L (ref 73–393)
LYMPHOCYTES # BLD AUTO: 0.8 10E9/L (ref 0.8–5.3)
LYMPHOCYTES # BLD AUTO: 1.2 THOU/UL (ref 0.8–4.4)
LYMPHOCYTES # BLD AUTO: 1.5 10E9/L (ref 0.8–5.3)
LYMPHOCYTES # BLD AUTO: 1.7 10E9/L (ref 0.8–5.3)
LYMPHOCYTES NFR BLD AUTO: 13.3 %
LYMPHOCYTES NFR BLD AUTO: 16.3 %
LYMPHOCYTES NFR BLD AUTO: 29 % (ref 20–40)
LYMPHOCYTES NFR BLD AUTO: 29.9 %
Lab: ABNORMAL
Lab: NORMAL
MAGNESIUM SERPL-MCNC: 1 MG/DL (ref 1.6–2.3)
MAGNESIUM SERPL-MCNC: 1.5 MG/DL (ref 1.6–2.3)
MAGNESIUM SERPL-MCNC: 1.7 MG/DL (ref 1.8–2.6)
MAGNESIUM SERPL-MCNC: 2.1 MG/DL (ref 1.6–2.3)
MCH RBC QN AUTO: 30.1 PG (ref 26.5–33)
MCH RBC QN AUTO: 30.4 PG (ref 26.5–33)
MCH RBC QN AUTO: 30.8 PG (ref 26.5–33)
MCH RBC QN AUTO: 30.8 PG (ref 26.5–33)
MCH RBC QN AUTO: 31.1 PG (ref 26.5–33)
MCH RBC QN AUTO: 31.2 PG (ref 27–34)
MCH RBC QN AUTO: 31.3 PG (ref 26.5–33)
MCH RBC QN AUTO: 31.9 PG (ref 27–34)
MCH RBC QN AUTO: 32.3 PG (ref 27–34)
MCH RBC QN AUTO: NORMAL PG (ref 26.5–33)
MCHC RBC AUTO-ENTMCNC: 29 G/DL (ref 31.5–36.5)
MCHC RBC AUTO-ENTMCNC: 30.7 G/DL (ref 31.5–36.5)
MCHC RBC AUTO-ENTMCNC: 31 G/DL (ref 31.5–36.5)
MCHC RBC AUTO-ENTMCNC: 31.1 G/DL (ref 31.5–36.5)
MCHC RBC AUTO-ENTMCNC: 31.3 G/DL (ref 31.5–36.5)
MCHC RBC AUTO-ENTMCNC: 32 G/DL (ref 31.5–36.5)
MCHC RBC AUTO-ENTMCNC: 33 G/DL (ref 32–36)
MCHC RBC AUTO-ENTMCNC: 33.4 G/DL (ref 32–36)
MCHC RBC AUTO-ENTMCNC: 34 G/DL (ref 32–36)
MCHC RBC AUTO-ENTMCNC: NORMAL G/DL (ref 31.5–36.5)
MCV RBC AUTO: 100 FL (ref 78–100)
MCV RBC AUTO: 100 FL (ref 78–100)
MCV RBC AUTO: 101 FL (ref 78–100)
MCV RBC AUTO: 104 FL (ref 78–100)
MCV RBC AUTO: 94 FL (ref 80–100)
MCV RBC AUTO: 95 FL (ref 80–100)
MCV RBC AUTO: 95 FL (ref 80–100)
MCV RBC AUTO: 96 FL (ref 78–100)
MCV RBC AUTO: 97 FL (ref 78–100)
MCV RBC AUTO: NORMAL FL (ref 78–100)
MONOCYTES # BLD AUTO: 0.4 10E9/L (ref 0–1.3)
MONOCYTES # BLD AUTO: 0.6 10E9/L (ref 0–1.3)
MONOCYTES # BLD AUTO: 0.6 THOU/UL (ref 0–0.9)
MONOCYTES # BLD AUTO: 1.1 10E9/L (ref 0–1.3)
MONOCYTES NFR BLD AUTO: 10.6 %
MONOCYTES NFR BLD AUTO: 11.9 %
MONOCYTES NFR BLD AUTO: 14 % (ref 2–10)
MONOCYTES NFR BLD AUTO: 6.6 %
NEUTROPHILS # BLD AUTO: 2.2 THOU/UL (ref 2–7.7)
NEUTROPHILS # BLD AUTO: 2.7 10E9/L (ref 1.6–8.3)
NEUTROPHILS # BLD AUTO: 4.4 10E9/L (ref 1.6–8.3)
NEUTROPHILS # BLD AUTO: 7.4 10E9/L (ref 1.6–8.3)
NEUTROPHILS NFR BLD AUTO: 51.9 %
NEUTROPHILS NFR BLD AUTO: 52 % (ref 50–70)
NEUTROPHILS NFR BLD AUTO: 70.2 %
NEUTROPHILS NFR BLD AUTO: 76.8 %
NITRATE UR QL: NEGATIVE
NITRATE UR QL: NEGATIVE
NONHDLC SERPL-MCNC: 117 MG/DL
NRBC # BLD AUTO: 0 10*3/UL
NRBC BLD AUTO-RTO: 0 /100
NUM BPU REQUESTED: 2
O2/TOTAL GAS SETTING VFR VENT: 21 %
PCO2 BLDV: 39 MM HG (ref 40–50)
PCO2 BLDV: 43 MM HG (ref 40–50)
PH BLDV: 7.26 PH (ref 7.32–7.43)
PH BLDV: 7.34 PH (ref 7.32–7.43)
PH UR STRIP: 5 PH (ref 5–7)
PH UR STRIP: 5.5 PH (ref 5–7)
PHOSPHATE SERPL-MCNC: 3 MG/DL (ref 2.5–4.5)
PLATELET # BLD AUTO: 135 10E9/L (ref 150–450)
PLATELET # BLD AUTO: 146 10E9/L (ref 150–450)
PLATELET # BLD AUTO: 148 10E9/L (ref 150–450)
PLATELET # BLD AUTO: 148 10E9/L (ref 150–450)
PLATELET # BLD AUTO: 161 10E9/L (ref 150–450)
PLATELET # BLD AUTO: 162 THOU/UL (ref 140–440)
PLATELET # BLD AUTO: 166 THOU/UL (ref 140–440)
PLATELET # BLD AUTO: 183 10E9/L (ref 150–450)
PLATELET # BLD AUTO: 208 THOU/UL (ref 140–440)
PLATELET # BLD AUTO: NORMAL 10E9/L (ref 150–450)
PMV BLD AUTO: 7.9 FL (ref 7–10)
PMV BLD AUTO: 8.1 FL (ref 7–10)
PMV BLD AUTO: 8.1 FL (ref 7–10)
PO2 BLDV: 21 MM HG (ref 25–47)
PO2 BLDV: 26 MM HG (ref 25–47)
POTASSIUM BLD-SCNC: 5 MMOL/L (ref 3.5–5)
POTASSIUM BLD-SCNC: 5.4 MMOL/L (ref 3.5–5)
POTASSIUM BLD-SCNC: 5.6 MMOL/L (ref 3.4–5.3)
POTASSIUM BLD-SCNC: 6 MMOL/L (ref 3.4–5.3)
POTASSIUM BLD-SCNC: 6.3 MMOL/L (ref 3.5–5)
POTASSIUM SERPL-SCNC: 4 MMOL/L (ref 3.4–5.3)
POTASSIUM SERPL-SCNC: 4.1 MMOL/L (ref 3.4–5.3)
POTASSIUM SERPL-SCNC: 4.5 MMOL/L (ref 3.4–5.3)
POTASSIUM SERPL-SCNC: 4.7 MMOL/L (ref 3.4–5.3)
POTASSIUM SERPL-SCNC: 4.7 MMOL/L (ref 3.4–5.3)
POTASSIUM SERPL-SCNC: 5.3 MMOL/L (ref 3.4–5.3)
POTASSIUM SERPL-SCNC: 5.4 MMOL/L (ref 3.4–5.3)
POTASSIUM SERPL-SCNC: 5.6 MMOL/L (ref 3.4–5.3)
POTASSIUM SERPL-SCNC: 5.8 MMOL/L (ref 3.4–5.3)
POTASSIUM SERPL-SCNC: 5.9 MMOL/L (ref 3.4–5.3)
POTASSIUM SERPL-SCNC: 6 MMOL/L (ref 3.4–5.3)
POTASSIUM SERPL-SCNC: 6.1 MMOL/L (ref 3.4–5.3)
POTASSIUM SERPL-SCNC: 6.3 MMOL/L (ref 3.4–5.3)
POTASSIUM SERPL-SCNC: 6.9 MMOL/L (ref 3.4–5.3)
POTASSIUM SERPL-SCNC: 7 MMOL/L (ref 3.4–5.3)
PREALB SERPL-MCNC: 12.1 MG/DL (ref 19–38)
PROT SERPL-MCNC: 5.8 G/DL (ref 6.8–8.8)
PROT SERPL-MCNC: 5.9 G/DL (ref 6.8–8.8)
RBC # BLD AUTO: 2.4 10E12/L (ref 3.8–5.2)
RBC # BLD AUTO: 2.49 10E12/L (ref 3.8–5.2)
RBC # BLD AUTO: 2.49 10E12/L (ref 3.8–5.2)
RBC # BLD AUTO: 2.51 10E12/L (ref 3.8–5.2)
RBC # BLD AUTO: 2.53 10E12/L (ref 3.8–5.2)
RBC # BLD AUTO: 2.63 10E12/L (ref 3.8–5.2)
RBC # BLD AUTO: 2.76 MILL/UL (ref 3.8–5.4)
RBC # BLD AUTO: 3.43 MILL/UL (ref 3.8–5.4)
RBC # BLD AUTO: 3.49 MILL/UL (ref 3.8–5.4)
RBC # BLD AUTO: NORMAL 10E12/L (ref 3.8–5.2)
RBC #/AREA URNS AUTO: 1 /HPF (ref 0–2)
RBC #/AREA URNS AUTO: 5 /HPF (ref 0–2)
RETICS # AUTO: 24.5 10E9/L (ref 25–95)
RETICS # AUTO: NORMAL 10E9/L (ref 25–95)
RETICS/RBC NFR AUTO: 1 % (ref 0.5–2)
RETICS/RBC NFR AUTO: 1 % (ref 0.5–2)
SAO2 % BLDV FROM PO2: 45 %
SODIUM BLD-SCNC: 137 MMOL/L (ref 133–144)
SODIUM SERPL-SCNC: 135 MMOL/L (ref 136–145)
SODIUM SERPL-SCNC: 135 MMOL/L (ref 136–145)
SODIUM SERPL-SCNC: 138 MMOL/L (ref 133–144)
SODIUM SERPL-SCNC: 138 MMOL/L (ref 136–145)
SODIUM SERPL-SCNC: 139 MMOL/L (ref 133–144)
SODIUM SERPL-SCNC: 139 MMOL/L (ref 133–144)
SODIUM SERPL-SCNC: 140 MMOL/L (ref 133–144)
SODIUM SERPL-SCNC: 142 MMOL/L (ref 133–144)
SODIUM SERPL-SCNC: 144 MMOL/L (ref 133–144)
SODIUM SERPL-SCNC: 144 MMOL/L (ref 133–144)
SOURCE: ABNORMAL
SOURCE: ABNORMAL
SP GR UR STRIP: 1.01 (ref 1–1.03)
SP GR UR STRIP: 1.01 (ref 1–1.03)
SPECIMEN EXP DATE BLD: NORMAL
SPECIMEN EXP DATE BLD: NORMAL
SPECIMEN SOURCE: ABNORMAL
SPECIMEN SOURCE: NORMAL
TIBC SERPL-MCNC: 202 UG/DL (ref 313–563)
TIBC SERPL-MCNC: 234 UG/DL (ref 240–430)
TRANSFERRIN SERPL-MCNC: 147 MG/DL (ref 210–360)
TRANSFERRIN SERPL-MCNC: 161 MG/DL (ref 212–360)
TRANSFUSION STATUS PATIENT QL: NORMAL
TRIGL SERPL-MCNC: 140 MG/DL
TROPONIN I SERPL-MCNC: 0.71 UG/L (ref 0–0.04)
TROPONIN I SERPL-MCNC: 1.11 UG/L (ref 0–0.04)
TROPONIN I SERPL-MCNC: <0.015 UG/L (ref 0–0.04)
TSH SERPL DL<=0.005 MIU/L-ACNC: 1.07 MU/L (ref 0.4–4)
TSH SERPL DL<=0.005 MIU/L-ACNC: 1.15 MU/L (ref 0.4–4)
UROBILINOGEN UR STRIP-MCNC: NORMAL MG/DL (ref 0–2)
UROBILINOGEN UR STRIP-MCNC: NORMAL MG/DL (ref 0–2)
VIT B12 SERPL-MCNC: 360 PG/ML (ref 193–986)
WBC # BLD AUTO: 10.5 10E9/L (ref 4–11)
WBC # BLD AUTO: 4.3 10E9/L (ref 4–11)
WBC # BLD AUTO: 5.1 10E9/L (ref 4–11)
WBC # BLD AUTO: 5.9 10E9/L (ref 4–11)
WBC # BLD AUTO: 6.1 10E9/L (ref 4–11)
WBC # BLD AUTO: 7.8 10E9/L (ref 4–11)
WBC # BLD AUTO: NORMAL 10E9/L (ref 4–11)
WBC #/AREA URNS AUTO: 109 /HPF (ref 0–5)
WBC #/AREA URNS AUTO: 17 /HPF (ref 0–5)
WBC: 4.1 THOU/UL (ref 4–11)
WBC: 4.7 THOU/UL (ref 4–11)
WBC: 5 THOU/UL (ref 4–11)

## 2019-01-01 PROCEDURE — 82330 ASSAY OF CALCIUM: CPT

## 2019-01-01 PROCEDURE — 25800025 ZZH RX 258: Performed by: FAMILY MEDICINE

## 2019-01-01 PROCEDURE — 85610 PROTHROMBIN TIME: CPT | Performed by: STUDENT IN AN ORGANIZED HEALTH CARE EDUCATION/TRAINING PROGRAM

## 2019-01-01 PROCEDURE — 40000556 ZZH STATISTIC PERIPHERAL IV START W US GUIDANCE

## 2019-01-01 PROCEDURE — 25800030 ZZH RX IP 258 OP 636: Performed by: EMERGENCY MEDICINE

## 2019-01-01 PROCEDURE — 25800030 ZZH RX IP 258 OP 636: Performed by: FAMILY MEDICINE

## 2019-01-01 PROCEDURE — 25000128 H RX IP 250 OP 636: Performed by: FAMILY MEDICINE

## 2019-01-01 PROCEDURE — 85025 COMPLETE CBC W/AUTO DIFF WBC: CPT | Performed by: FAMILY MEDICINE

## 2019-01-01 PROCEDURE — 25000125 ZZHC RX 250: Performed by: FAMILY MEDICINE

## 2019-01-01 PROCEDURE — 12000001 ZZH R&B MED SURG/OB UMMC

## 2019-01-01 PROCEDURE — 40000498 ZZHCL STATISTIC POTASSIUM ED POCT

## 2019-01-01 PROCEDURE — 40000802 ZZH SITE CHECK

## 2019-01-01 PROCEDURE — 25000132 ZZH RX MED GY IP 250 OP 250 PS 637: Performed by: STUDENT IN AN ORGANIZED HEALTH CARE EDUCATION/TRAINING PROGRAM

## 2019-01-01 PROCEDURE — 83550 IRON BINDING TEST: CPT | Performed by: HOSPITALIST

## 2019-01-01 PROCEDURE — 93005 ELECTROCARDIOGRAM TRACING: CPT | Performed by: EMERGENCY MEDICINE

## 2019-01-01 PROCEDURE — 25000132 ZZH RX MED GY IP 250 OP 250 PS 637: Performed by: FAMILY MEDICINE

## 2019-01-01 PROCEDURE — 00000146 ZZHCL STATISTIC GLUCOSE BY METER IP

## 2019-01-01 PROCEDURE — 96368 THER/DIAG CONCURRENT INF: CPT | Performed by: FAMILY MEDICINE

## 2019-01-01 PROCEDURE — 93005 ELECTROCARDIOGRAM TRACING: CPT | Mod: 76 | Performed by: EMERGENCY MEDICINE

## 2019-01-01 PROCEDURE — 87086 URINE CULTURE/COLONY COUNT: CPT | Performed by: INTERNAL MEDICINE

## 2019-01-01 PROCEDURE — 80048 BASIC METABOLIC PNL TOTAL CA: CPT | Performed by: STUDENT IN AN ORGANIZED HEALTH CARE EDUCATION/TRAINING PROGRAM

## 2019-01-01 PROCEDURE — 83605 ASSAY OF LACTIC ACID: CPT | Performed by: FAMILY MEDICINE

## 2019-01-01 PROCEDURE — 82803 BLOOD GASES ANY COMBINATION: CPT

## 2019-01-01 PROCEDURE — 97535 SELF CARE MNGMENT TRAINING: CPT | Mod: GO | Performed by: OCCUPATIONAL THERAPIST

## 2019-01-01 PROCEDURE — 84520 ASSAY OF UREA NITROGEN: CPT | Performed by: INTERNAL MEDICINE

## 2019-01-01 PROCEDURE — 96366 THER/PROPH/DIAG IV INF ADDON: CPT | Performed by: EMERGENCY MEDICINE

## 2019-01-01 PROCEDURE — 40000611 ZZHCL STATISTIC MORPHOLOGY W/INTERP HEMEPATH TC 85060: Performed by: HOSPITALIST

## 2019-01-01 PROCEDURE — 25000128 H RX IP 250 OP 636: Performed by: STUDENT IN AN ORGANIZED HEALTH CARE EDUCATION/TRAINING PROGRAM

## 2019-01-01 PROCEDURE — 84132 ASSAY OF SERUM POTASSIUM: CPT | Performed by: STUDENT IN AN ORGANIZED HEALTH CARE EDUCATION/TRAINING PROGRAM

## 2019-01-01 PROCEDURE — 25800025 ZZH RX 258: Performed by: STUDENT IN AN ORGANIZED HEALTH CARE EDUCATION/TRAINING PROGRAM

## 2019-01-01 PROCEDURE — 70450 CT HEAD/BRAIN W/O DYE: CPT

## 2019-01-01 PROCEDURE — 93005 ELECTROCARDIOGRAM TRACING: CPT

## 2019-01-01 PROCEDURE — 25000125 ZZHC RX 250: Performed by: INTERNAL MEDICINE

## 2019-01-01 PROCEDURE — 85027 COMPLETE CBC AUTOMATED: CPT | Performed by: STUDENT IN AN ORGANIZED HEALTH CARE EDUCATION/TRAINING PROGRAM

## 2019-01-01 PROCEDURE — 99223 1ST HOSP IP/OBS HIGH 75: CPT | Mod: AI | Performed by: HOSPITALIST

## 2019-01-01 PROCEDURE — 99285 EMERGENCY DEPT VISIT HI MDM: CPT | Mod: 25 | Performed by: FAMILY MEDICINE

## 2019-01-01 PROCEDURE — 36415 COLL VENOUS BLD VENIPUNCTURE: CPT | Performed by: STUDENT IN AN ORGANIZED HEALTH CARE EDUCATION/TRAINING PROGRAM

## 2019-01-01 PROCEDURE — 84132 ASSAY OF SERUM POTASSIUM: CPT | Performed by: HOSPITALIST

## 2019-01-01 PROCEDURE — 25000128 H RX IP 250 OP 636: Performed by: EMERGENCY MEDICINE

## 2019-01-01 PROCEDURE — 84484 ASSAY OF TROPONIN QUANT: CPT | Performed by: FAMILY MEDICINE

## 2019-01-01 PROCEDURE — 96368 THER/DIAG CONCURRENT INF: CPT | Performed by: EMERGENCY MEDICINE

## 2019-01-01 PROCEDURE — 25000132 ZZH RX MED GY IP 250 OP 250 PS 637: Performed by: HOSPITALIST

## 2019-01-01 PROCEDURE — 85610 PROTHROMBIN TIME: CPT | Performed by: HOSPITALIST

## 2019-01-01 PROCEDURE — 93306 TTE W/DOPPLER COMPLETE: CPT | Mod: 26 | Performed by: INTERNAL MEDICINE

## 2019-01-01 PROCEDURE — 97165 OT EVAL LOW COMPLEX 30 MIN: CPT | Mod: GO | Performed by: OCCUPATIONAL THERAPIST

## 2019-01-01 PROCEDURE — 99291 CRITICAL CARE FIRST HOUR: CPT | Mod: 25 | Performed by: FAMILY MEDICINE

## 2019-01-01 PROCEDURE — 86900 BLOOD TYPING SEROLOGIC ABO: CPT | Performed by: EMERGENCY MEDICINE

## 2019-01-01 PROCEDURE — 84132 ASSAY OF SERUM POTASSIUM: CPT | Performed by: FAMILY MEDICINE

## 2019-01-01 PROCEDURE — 83735 ASSAY OF MAGNESIUM: CPT | Performed by: STUDENT IN AN ORGANIZED HEALTH CARE EDUCATION/TRAINING PROGRAM

## 2019-01-01 PROCEDURE — 82746 ASSAY OF FOLIC ACID SERUM: CPT | Performed by: HOSPITALIST

## 2019-01-01 PROCEDURE — 97110 THERAPEUTIC EXERCISES: CPT | Mod: GO | Performed by: OCCUPATIONAL THERAPIST

## 2019-01-01 PROCEDURE — 96365 THER/PROPH/DIAG IV INF INIT: CPT | Performed by: FAMILY MEDICINE

## 2019-01-01 PROCEDURE — 83605 ASSAY OF LACTIC ACID: CPT

## 2019-01-01 PROCEDURE — 40000501 ZZHCL STATISTIC HEMATOCRIT ED POCT

## 2019-01-01 PROCEDURE — 25000132 ZZH RX MED GY IP 250 OP 250 PS 637: Performed by: INTERNAL MEDICINE

## 2019-01-01 PROCEDURE — 87088 URINE BACTERIA CULTURE: CPT | Performed by: FAMILY MEDICINE

## 2019-01-01 PROCEDURE — 87186 SC STD MICRODIL/AGAR DIL: CPT | Performed by: FAMILY MEDICINE

## 2019-01-01 PROCEDURE — 21400000 ZZH R&B CCU UMMC

## 2019-01-01 PROCEDURE — 40000893 ZZH STATISTIC PT IP EVAL DEFER

## 2019-01-01 PROCEDURE — 83690 ASSAY OF LIPASE: CPT | Performed by: FAMILY MEDICINE

## 2019-01-01 PROCEDURE — 87086 URINE CULTURE/COLONY COUNT: CPT | Performed by: FAMILY MEDICINE

## 2019-01-01 PROCEDURE — 93010 ELECTROCARDIOGRAM REPORT: CPT | Mod: Z6 | Performed by: EMERGENCY MEDICINE

## 2019-01-01 PROCEDURE — 84443 ASSAY THYROID STIM HORMONE: CPT | Performed by: HOSPITALIST

## 2019-01-01 PROCEDURE — 85610 PROTHROMBIN TIME: CPT | Performed by: EMERGENCY MEDICINE

## 2019-01-01 PROCEDURE — 82803 BLOOD GASES ANY COMBINATION: CPT | Performed by: FAMILY MEDICINE

## 2019-01-01 PROCEDURE — 80061 LIPID PANEL: CPT | Performed by: STUDENT IN AN ORGANIZED HEALTH CARE EDUCATION/TRAINING PROGRAM

## 2019-01-01 PROCEDURE — 99238 HOSP IP/OBS DSCHRG MGMT 30/<: CPT | Mod: 25 | Performed by: INTERNAL MEDICINE

## 2019-01-01 PROCEDURE — 96365 THER/PROPH/DIAG IV INF INIT: CPT | Performed by: EMERGENCY MEDICINE

## 2019-01-01 PROCEDURE — 85027 COMPLETE CBC AUTOMATED: CPT | Performed by: HOSPITALIST

## 2019-01-01 PROCEDURE — 96375 TX/PRO/DX INJ NEW DRUG ADDON: CPT | Performed by: FAMILY MEDICINE

## 2019-01-01 PROCEDURE — 84484 ASSAY OF TROPONIN QUANT: CPT | Performed by: EMERGENCY MEDICINE

## 2019-01-01 PROCEDURE — 85045 AUTOMATED RETICULOCYTE COUNT: CPT | Performed by: STUDENT IN AN ORGANIZED HEALTH CARE EDUCATION/TRAINING PROGRAM

## 2019-01-01 PROCEDURE — 36415 COLL VENOUS BLD VENIPUNCTURE: CPT | Performed by: HOSPITALIST

## 2019-01-01 PROCEDURE — 85045 AUTOMATED RETICULOCYTE COUNT: CPT | Performed by: HOSPITALIST

## 2019-01-01 PROCEDURE — 86901 BLOOD TYPING SEROLOGIC RH(D): CPT | Performed by: FAMILY MEDICINE

## 2019-01-01 PROCEDURE — 40000497 ZZHCL STATISTIC SODIUM ED POCT

## 2019-01-01 PROCEDURE — 82728 ASSAY OF FERRITIN: CPT | Performed by: HOSPITALIST

## 2019-01-01 PROCEDURE — 86901 BLOOD TYPING SEROLOGIC RH(D): CPT | Performed by: EMERGENCY MEDICINE

## 2019-01-01 PROCEDURE — 85025 COMPLETE CBC W/AUTO DIFF WBC: CPT | Performed by: EMERGENCY MEDICINE

## 2019-01-01 PROCEDURE — 86923 COMPATIBILITY TEST ELECTRIC: CPT | Performed by: EMERGENCY MEDICINE

## 2019-01-01 PROCEDURE — 82565 ASSAY OF CREATININE: CPT | Performed by: INTERNAL MEDICINE

## 2019-01-01 PROCEDURE — 84100 ASSAY OF PHOSPHORUS: CPT | Performed by: EMERGENCY MEDICINE

## 2019-01-01 PROCEDURE — 93306 TTE W/DOPPLER COMPLETE: CPT

## 2019-01-01 PROCEDURE — 86850 RBC ANTIBODY SCREEN: CPT | Performed by: FAMILY MEDICINE

## 2019-01-01 PROCEDURE — 99291 CRITICAL CARE FIRST HOUR: CPT | Mod: 25 | Performed by: EMERGENCY MEDICINE

## 2019-01-01 PROCEDURE — 82607 VITAMIN B-12: CPT | Performed by: HOSPITALIST

## 2019-01-01 PROCEDURE — 80053 COMPREHEN METABOLIC PANEL: CPT | Performed by: FAMILY MEDICINE

## 2019-01-01 PROCEDURE — 86900 BLOOD TYPING SEROLOGIC ABO: CPT | Performed by: FAMILY MEDICINE

## 2019-01-01 PROCEDURE — 25800030 ZZH RX IP 258 OP 636: Performed by: STUDENT IN AN ORGANIZED HEALTH CARE EDUCATION/TRAINING PROGRAM

## 2019-01-01 PROCEDURE — 25000132 ZZH RX MED GY IP 250 OP 250 PS 637

## 2019-01-01 PROCEDURE — 81001 URINALYSIS AUTO W/SCOPE: CPT | Performed by: EMERGENCY MEDICINE

## 2019-01-01 PROCEDURE — 40000141 ZZH STATISTIC PERIPHERAL IV START W/O US GUIDANCE

## 2019-01-01 PROCEDURE — 93005 ELECTROCARDIOGRAM TRACING: CPT | Performed by: FAMILY MEDICINE

## 2019-01-01 PROCEDURE — 84466 ASSAY OF TRANSFERRIN: CPT | Performed by: HOSPITALIST

## 2019-01-01 PROCEDURE — 84484 ASSAY OF TROPONIN QUANT: CPT | Performed by: STUDENT IN AN ORGANIZED HEALTH CARE EDUCATION/TRAINING PROGRAM

## 2019-01-01 PROCEDURE — 81001 URINALYSIS AUTO W/SCOPE: CPT | Performed by: FAMILY MEDICINE

## 2019-01-01 PROCEDURE — 82550 ASSAY OF CK (CPK): CPT | Performed by: EMERGENCY MEDICINE

## 2019-01-01 PROCEDURE — 97530 THERAPEUTIC ACTIVITIES: CPT | Mod: GO

## 2019-01-01 PROCEDURE — 96361 HYDRATE IV INFUSION ADD-ON: CPT | Performed by: FAMILY MEDICINE

## 2019-01-01 PROCEDURE — 97165 OT EVAL LOW COMPLEX 30 MIN: CPT | Mod: GO

## 2019-01-01 PROCEDURE — 80048 BASIC METABOLIC PNL TOTAL CA: CPT | Performed by: HOSPITALIST

## 2019-01-01 PROCEDURE — 84132 ASSAY OF SERUM POTASSIUM: CPT | Performed by: INTERNAL MEDICINE

## 2019-01-01 PROCEDURE — 82310 ASSAY OF CALCIUM: CPT | Performed by: INTERNAL MEDICINE

## 2019-01-01 PROCEDURE — 93010 ELECTROCARDIOGRAM REPORT: CPT | Mod: Z6 | Performed by: FAMILY MEDICINE

## 2019-01-01 PROCEDURE — 85610 PROTHROMBIN TIME: CPT | Performed by: FAMILY MEDICINE

## 2019-01-01 PROCEDURE — 85004 AUTOMATED DIFF WBC COUNT: CPT | Performed by: HOSPITALIST

## 2019-01-01 PROCEDURE — 72131 CT LUMBAR SPINE W/O DYE: CPT

## 2019-01-01 PROCEDURE — 84443 ASSAY THYROID STIM HORMONE: CPT | Performed by: FAMILY MEDICINE

## 2019-01-01 PROCEDURE — 97535 SELF CARE MNGMENT TRAINING: CPT | Mod: GO

## 2019-01-01 PROCEDURE — 84295 ASSAY OF SERUM SODIUM: CPT | Performed by: INTERNAL MEDICINE

## 2019-01-01 PROCEDURE — 83735 ASSAY OF MAGNESIUM: CPT | Performed by: EMERGENCY MEDICINE

## 2019-01-01 PROCEDURE — 99285 EMERGENCY DEPT VISIT HI MDM: CPT | Mod: 25 | Performed by: EMERGENCY MEDICINE

## 2019-01-01 PROCEDURE — 99233 SBSQ HOSP IP/OBS HIGH 50: CPT | Mod: GC | Performed by: HOSPITALIST

## 2019-01-01 PROCEDURE — 99239 HOSP IP/OBS DSCHRG MGMT >30: CPT | Mod: GC | Performed by: HOSPITALIST

## 2019-01-01 PROCEDURE — 83540 ASSAY OF IRON: CPT | Performed by: HOSPITALIST

## 2019-01-01 PROCEDURE — 84132 ASSAY OF SERUM POTASSIUM: CPT | Performed by: EMERGENCY MEDICINE

## 2019-01-01 PROCEDURE — 93010 ELECTROCARDIOGRAM REPORT: CPT | Performed by: INTERNAL MEDICINE

## 2019-01-01 PROCEDURE — 86850 RBC ANTIBODY SCREEN: CPT | Performed by: EMERGENCY MEDICINE

## 2019-01-01 PROCEDURE — 80053 COMPREHEN METABOLIC PANEL: CPT | Performed by: EMERGENCY MEDICINE

## 2019-01-01 PROCEDURE — 40000502 ZZHCL STATISTIC GLUCOSE ED POCT

## 2019-01-01 PROCEDURE — 99222 1ST HOSP IP/OBS MODERATE 55: CPT | Mod: GC | Performed by: INTERNAL MEDICINE

## 2019-01-01 PROCEDURE — 85730 THROMBOPLASTIN TIME PARTIAL: CPT | Performed by: FAMILY MEDICINE

## 2019-01-01 PROCEDURE — 71046 X-RAY EXAM CHEST 2 VIEWS: CPT

## 2019-01-01 PROCEDURE — 82435 ASSAY OF BLOOD CHLORIDE: CPT | Performed by: INTERNAL MEDICINE

## 2019-01-01 PROCEDURE — 82947 ASSAY GLUCOSE BLOOD QUANT: CPT | Performed by: INTERNAL MEDICINE

## 2019-01-01 PROCEDURE — 86140 C-REACTIVE PROTEIN: CPT | Performed by: HOSPITALIST

## 2019-01-01 PROCEDURE — 36416 COLLJ CAPILLARY BLOOD SPEC: CPT | Performed by: EMERGENCY MEDICINE

## 2019-01-01 PROCEDURE — 85652 RBC SED RATE AUTOMATED: CPT | Performed by: HOSPITALIST

## 2019-01-01 PROCEDURE — 93010 ELECTROCARDIOGRAM REPORT: CPT | Mod: 76 | Performed by: EMERGENCY MEDICINE

## 2019-01-01 RX ORDER — LACTOBACILLUS RHAMNOSUS GG 10B CELL
1 CAPSULE ORAL 2 TIMES DAILY
Status: DISCONTINUED | OUTPATIENT
Start: 2019-01-01 | End: 2019-01-01 | Stop reason: HOSPADM

## 2019-01-01 RX ORDER — WARFARIN SODIUM 2.5 MG/1
2.5 TABLET ORAL
Status: DISCONTINUED | OUTPATIENT
Start: 2019-01-01 | End: 2019-01-01 | Stop reason: HOSPADM

## 2019-01-01 RX ORDER — ONDANSETRON 2 MG/ML
4 INJECTION INTRAMUSCULAR; INTRAVENOUS EVERY 6 HOURS PRN
Status: DISCONTINUED | OUTPATIENT
Start: 2019-01-01 | End: 2019-01-01 | Stop reason: HOSPADM

## 2019-01-01 RX ORDER — ONDANSETRON 4 MG/1
4 TABLET, ORALLY DISINTEGRATING ORAL EVERY 6 HOURS PRN
Qty: 30 TABLET | Refills: 1 | Status: SHIPPED | OUTPATIENT
Start: 2019-01-01 | End: 2019-01-01

## 2019-01-01 RX ORDER — DEXTROSE MONOHYDRATE 25 G/50ML
25-50 INJECTION, SOLUTION INTRAVENOUS
Status: DISCONTINUED | OUTPATIENT
Start: 2019-01-01 | End: 2019-01-01 | Stop reason: HOSPADM

## 2019-01-01 RX ORDER — CIPROFLOXACIN 500 MG/1
500 TABLET, FILM COATED ORAL EVERY 12 HOURS
Qty: 8 TABLET | Refills: 1 | Status: SHIPPED | OUTPATIENT
Start: 2019-01-01 | End: 2019-01-01

## 2019-01-01 RX ORDER — ATORVASTATIN CALCIUM 40 MG/1
40 TABLET, FILM COATED ORAL EVERY EVENING
Status: DISCONTINUED | OUTPATIENT
Start: 2019-01-01 | End: 2019-01-01 | Stop reason: HOSPADM

## 2019-01-01 RX ORDER — LACTOBACILLUS RHAMNOSUS GG 10B CELL
1 CAPSULE ORAL 2 TIMES DAILY
Qty: 90 CAPSULE | Refills: 1 | Status: SHIPPED | OUTPATIENT
Start: 2019-01-01 | End: 2019-01-01

## 2019-01-01 RX ORDER — DEXTROSE MONOHYDRATE 100 MG/ML
INJECTION, SOLUTION INTRAVENOUS CONTINUOUS
Status: DISCONTINUED | OUTPATIENT
Start: 2019-01-01 | End: 2019-01-01

## 2019-01-01 RX ORDER — DEXTROSE MONOHYDRATE 25 G/50ML
25-50 INJECTION, SOLUTION INTRAVENOUS
Status: DISCONTINUED | OUTPATIENT
Start: 2019-01-01 | End: 2019-01-01

## 2019-01-01 RX ORDER — BENZTROPINE MESYLATE 1 MG/1
1-2 TABLET ORAL 3 TIMES DAILY PRN
Status: DISCONTINUED | OUTPATIENT
Start: 2019-01-01 | End: 2019-01-01

## 2019-01-01 RX ORDER — MIRTAZAPINE 7.5 MG/1
7.5 TABLET, FILM COATED ORAL AT BEDTIME
Status: DISCONTINUED | OUTPATIENT
Start: 2019-01-01 | End: 2019-01-01

## 2019-01-01 RX ORDER — DEXTROSE MONOHYDRATE 25 G/50ML
25 INJECTION, SOLUTION INTRAVENOUS ONCE
Status: COMPLETED | OUTPATIENT
Start: 2019-01-01 | End: 2019-01-01

## 2019-01-01 RX ORDER — NICOTINE POLACRILEX 4 MG
15-30 LOZENGE BUCCAL
Status: DISCONTINUED | OUTPATIENT
Start: 2019-01-01 | End: 2019-01-01 | Stop reason: HOSPADM

## 2019-01-01 RX ORDER — WARFARIN SODIUM 2.5 MG/1
2.5 TABLET ORAL
Status: COMPLETED | OUTPATIENT
Start: 2019-01-01 | End: 2019-01-01

## 2019-01-01 RX ORDER — SIMVASTATIN 40 MG
40 TABLET ORAL AT BEDTIME
COMMUNITY

## 2019-01-01 RX ORDER — ALUMINA, MAGNESIA, AND SIMETHICONE 2400; 2400; 240 MG/30ML; MG/30ML; MG/30ML
30 SUSPENSION ORAL EVERY 4 HOURS PRN
Status: DISCONTINUED | OUTPATIENT
Start: 2019-01-01 | End: 2019-01-01 | Stop reason: HOSPADM

## 2019-01-01 RX ORDER — MAGNESIUM SULFATE 1 G/100ML
1 INJECTION INTRAVENOUS ONCE
Status: COMPLETED | OUTPATIENT
Start: 2019-01-01 | End: 2019-01-01

## 2019-01-01 RX ORDER — ASPIRIN 325 MG
325 TABLET ORAL DAILY
Status: DISCONTINUED | OUTPATIENT
Start: 2019-01-01 | End: 2019-01-01

## 2019-01-01 RX ORDER — ACETAMINOPHEN 325 MG/1
650 TABLET ORAL EVERY 4 HOURS PRN
Status: DISCONTINUED | OUTPATIENT
Start: 2019-01-01 | End: 2019-01-01

## 2019-01-01 RX ORDER — WARFARIN SODIUM 1 MG/1
2.5 TABLET ORAL DAILY
COMMUNITY
End: 2019-01-01

## 2019-01-01 RX ORDER — SODIUM CHLORIDE 9 MG/ML
INJECTION, SOLUTION INTRAVENOUS ONCE
Status: DISCONTINUED | OUTPATIENT
Start: 2019-01-01 | End: 2019-01-01 | Stop reason: HOSPADM

## 2019-01-01 RX ORDER — CEFTRIAXONE 1 G/1
1 INJECTION, POWDER, FOR SOLUTION INTRAMUSCULAR; INTRAVENOUS ONCE
Status: COMPLETED | OUTPATIENT
Start: 2019-01-01 | End: 2019-01-01

## 2019-01-01 RX ORDER — SODIUM CHLORIDE 9 MG/ML
INJECTION, SOLUTION INTRAVENOUS CONTINUOUS
Status: DISCONTINUED | OUTPATIENT
Start: 2019-01-01 | End: 2019-01-01 | Stop reason: HOSPADM

## 2019-01-01 RX ORDER — NALOXONE HYDROCHLORIDE 0.4 MG/ML
.1-.4 INJECTION, SOLUTION INTRAMUSCULAR; INTRAVENOUS; SUBCUTANEOUS
Status: DISCONTINUED | OUTPATIENT
Start: 2019-01-01 | End: 2019-01-01 | Stop reason: HOSPADM

## 2019-01-01 RX ORDER — DEXTROSE MONOHYDRATE 25 G/50ML
25 INJECTION, SOLUTION INTRAVENOUS ONCE
Status: DISCONTINUED | OUTPATIENT
Start: 2019-01-01 | End: 2019-01-01

## 2019-01-01 RX ORDER — MIRTAZAPINE 7.5 MG/1
15 TABLET, FILM COATED ORAL AT BEDTIME
Qty: 90 TABLET | Refills: 1 | Status: SHIPPED | OUTPATIENT
Start: 2019-01-01 | End: 2019-01-01

## 2019-01-01 RX ORDER — LANOLIN ALCOHOL/MO/W.PET/CERES
3 CREAM (GRAM) TOPICAL
Status: DISCONTINUED | OUTPATIENT
Start: 2019-01-01 | End: 2019-01-01 | Stop reason: HOSPADM

## 2019-01-01 RX ORDER — ASPIRIN 81 MG/1
81 TABLET ORAL DAILY
Status: DISCONTINUED | OUTPATIENT
Start: 2019-01-01 | End: 2019-01-01

## 2019-01-01 RX ORDER — MULTIVIT-MIN/IRON/FOLIC ACID/K 18-600-40
1 CAPSULE ORAL DAILY
Status: DISCONTINUED | OUTPATIENT
Start: 2019-01-01 | End: 2019-01-01

## 2019-01-01 RX ORDER — ONDANSETRON 4 MG/1
4 TABLET, ORALLY DISINTEGRATING ORAL EVERY 6 HOURS PRN
Status: DISCONTINUED | OUTPATIENT
Start: 2019-01-01 | End: 2019-01-01 | Stop reason: HOSPADM

## 2019-01-01 RX ORDER — WARFARIN SODIUM 1 MG/1
1.5 TABLET ORAL DAILY
Qty: 30 TABLET | Refills: 0 | Status: SHIPPED | OUTPATIENT
Start: 2019-01-01 | End: 2019-01-01

## 2019-01-01 RX ORDER — MIRTAZAPINE 15 MG/1
15 TABLET, FILM COATED ORAL AT BEDTIME
Status: DISCONTINUED | OUTPATIENT
Start: 2019-01-01 | End: 2019-01-01

## 2019-01-01 RX ORDER — CALCIUM CARBONATE 500 MG/1
1000 TABLET, CHEWABLE ORAL 4 TIMES DAILY PRN
Status: DISCONTINUED | OUTPATIENT
Start: 2019-01-01 | End: 2019-01-01 | Stop reason: HOSPADM

## 2019-01-01 RX ORDER — CIPROFLOXACIN 250 MG/1
500 TABLET, FILM COATED ORAL EVERY 24 HOURS
Status: DISCONTINUED | OUTPATIENT
Start: 2019-01-01 | End: 2019-01-01 | Stop reason: HOSPADM

## 2019-01-01 RX ORDER — ANASTROZOLE 1 MG/1
1 TABLET ORAL DAILY
Status: DISCONTINUED | OUTPATIENT
Start: 2019-01-01 | End: 2019-01-01 | Stop reason: HOSPADM

## 2019-01-01 RX ORDER — NITROGLYCERIN 0.4 MG/1
0.4 TABLET SUBLINGUAL EVERY 5 MIN PRN
Status: DISCONTINUED | OUTPATIENT
Start: 2019-01-01 | End: 2019-01-01 | Stop reason: HOSPADM

## 2019-01-01 RX ORDER — ACETAMINOPHEN 325 MG/1
975 TABLET ORAL EVERY 8 HOURS
Status: DISCONTINUED | OUTPATIENT
Start: 2019-01-01 | End: 2019-01-01 | Stop reason: HOSPADM

## 2019-01-01 RX ORDER — ACETAMINOPHEN 325 MG/1
650 TABLET ORAL EVERY 4 HOURS PRN
Status: DISCONTINUED | OUTPATIENT
Start: 2019-01-01 | End: 2019-01-01 | Stop reason: HOSPADM

## 2019-01-01 RX ORDER — CEFTRIAXONE 2 G/1
2 INJECTION, POWDER, FOR SOLUTION INTRAMUSCULAR; INTRAVENOUS EVERY 24 HOURS
Status: COMPLETED | OUTPATIENT
Start: 2019-01-01 | End: 2019-01-01

## 2019-01-01 RX ORDER — MIRTAZAPINE 7.5 MG/1
7.5 TABLET, FILM COATED ORAL AT BEDTIME
Status: DISCONTINUED | OUTPATIENT
Start: 2019-01-01 | End: 2019-01-01 | Stop reason: HOSPADM

## 2019-01-01 RX ORDER — CIPROFLOXACIN 250 MG/1
500 TABLET, FILM COATED ORAL
Status: DISCONTINUED | OUTPATIENT
Start: 2019-01-01 | End: 2019-01-01

## 2019-01-01 RX ORDER — VALSARTAN AND HYDROCHLOROTHIAZIDE 80; 12.5 MG/1; MG/1
1 TABLET, FILM COATED ORAL DAILY
Status: ON HOLD | COMMUNITY
End: 2019-01-01

## 2019-01-01 RX ORDER — MIRTAZAPINE 15 MG/1
15 TABLET, FILM COATED ORAL AT BEDTIME
Status: DISCONTINUED | OUTPATIENT
Start: 2019-01-01 | End: 2019-01-01 | Stop reason: HOSPADM

## 2019-01-01 RX ORDER — MIRTAZAPINE 7.5 MG/1
7.5 TABLET, FILM COATED ORAL AT BEDTIME
Qty: 90 TABLET | Refills: 1 | Status: SHIPPED | OUTPATIENT
Start: 2019-01-01 | End: 2019-01-01

## 2019-01-01 RX ORDER — MAGNESIUM SULFATE 1 G/100ML
2 INJECTION INTRAVENOUS ONCE
Status: DISCONTINUED | OUTPATIENT
Start: 2019-01-01 | End: 2019-01-01

## 2019-01-01 RX ORDER — MIRTAZAPINE 15 MG/1
15 TABLET, FILM COATED ORAL AT BEDTIME
COMMUNITY

## 2019-01-01 RX ORDER — SODIUM CHLORIDE 9 MG/ML
1000 INJECTION, SOLUTION INTRAVENOUS CONTINUOUS
Status: DISPENSED | OUTPATIENT
Start: 2019-01-01 | End: 2019-01-01

## 2019-01-01 RX ORDER — WARFARIN SODIUM 3 MG/1
3 TABLET ORAL
Status: COMPLETED | OUTPATIENT
Start: 2019-01-01 | End: 2019-01-01

## 2019-01-01 RX ORDER — WARFARIN SODIUM 2.5 MG/1
TABLET ORAL
COMMUNITY

## 2019-01-01 RX ORDER — LIDOCAINE 40 MG/G
CREAM TOPICAL
Status: DISCONTINUED | OUTPATIENT
Start: 2019-01-01 | End: 2019-01-01 | Stop reason: HOSPADM

## 2019-01-01 RX ORDER — MULTIVIT-MIN/IRON/FOLIC ACID/K 18-600-40
1 CAPSULE ORAL DAILY
COMMUNITY

## 2019-01-01 RX ORDER — AMOXICILLIN 250 MG
2 CAPSULE ORAL 2 TIMES DAILY PRN
Status: DISCONTINUED | OUTPATIENT
Start: 2019-01-01 | End: 2019-01-01 | Stop reason: HOSPADM

## 2019-01-01 RX ORDER — SIMVASTATIN 40 MG
40 TABLET ORAL AT BEDTIME
Status: DISCONTINUED | OUTPATIENT
Start: 2019-01-01 | End: 2019-01-01

## 2019-01-01 RX ORDER — NICOTINE POLACRILEX 4 MG
15-30 LOZENGE BUCCAL
Status: DISCONTINUED | OUTPATIENT
Start: 2019-01-01 | End: 2019-01-01

## 2019-01-01 RX ORDER — FUROSEMIDE 10 MG/ML
40 INJECTION INTRAMUSCULAR; INTRAVENOUS ONCE
Status: COMPLETED | OUTPATIENT
Start: 2019-01-01 | End: 2019-01-01

## 2019-01-01 RX ORDER — SULFAMETHOXAZOLE/TRIMETHOPRIM 800-160 MG
1 TABLET ORAL 2 TIMES DAILY
Status: ON HOLD | COMMUNITY
End: 2019-01-01

## 2019-01-01 RX ORDER — FUROSEMIDE 10 MG/ML
20 INJECTION INTRAMUSCULAR; INTRAVENOUS ONCE
Status: DISCONTINUED | OUTPATIENT
Start: 2019-01-01 | End: 2019-01-01 | Stop reason: CLARIF

## 2019-01-01 RX ORDER — ANASTROZOLE 1 MG/1
1 TABLET ORAL DAILY
COMMUNITY

## 2019-01-01 RX ORDER — SIMVASTATIN 40 MG
40 TABLET ORAL AT BEDTIME
Status: DISCONTINUED | OUTPATIENT
Start: 2019-01-01 | End: 2019-01-01 | Stop reason: HOSPADM

## 2019-01-01 RX ORDER — SODIUM POLYSTYRENE SULFONATE 15 G/60ML
30 SUSPENSION ORAL; RECTAL ONCE
Status: COMPLETED | OUTPATIENT
Start: 2019-01-01 | End: 2019-01-01

## 2019-01-01 RX ORDER — MIRTAZAPINE 7.5 MG/1
7.5 TABLET, FILM COATED ORAL AT BEDTIME
Status: ON HOLD | COMMUNITY
End: 2019-01-01

## 2019-01-01 RX ORDER — ONDANSETRON 2 MG/ML
4 INJECTION INTRAMUSCULAR; INTRAVENOUS ONCE
Status: COMPLETED | OUTPATIENT
Start: 2019-01-01 | End: 2019-01-01

## 2019-01-01 RX ORDER — AMOXICILLIN 250 MG
1 CAPSULE ORAL 2 TIMES DAILY PRN
Status: DISCONTINUED | OUTPATIENT
Start: 2019-01-01 | End: 2019-01-01 | Stop reason: HOSPADM

## 2019-01-01 RX ADMIN — OMEPRAZOLE 40 MG: 20 CAPSULE, DELAYED RELEASE ORAL at 09:10

## 2019-01-01 RX ADMIN — MICONAZOLE NITRATE: 2 POWDER TOPICAL at 22:06

## 2019-01-01 RX ADMIN — ONDANSETRON 4 MG: 2 INJECTION INTRAMUSCULAR; INTRAVENOUS at 20:08

## 2019-01-01 RX ADMIN — Medication 1 CAPSULE: at 21:24

## 2019-01-01 RX ADMIN — Medication 1 CAPSULE: at 19:46

## 2019-01-01 RX ADMIN — ONDANSETRON 4 MG: 2 INJECTION INTRAMUSCULAR; INTRAVENOUS at 12:25

## 2019-01-01 RX ADMIN — DEXTROSE MONOHYDRATE 25 ML: 25 INJECTION, SOLUTION INTRAVENOUS at 02:47

## 2019-01-01 RX ADMIN — MIRTAZAPINE 7.5 MG: 7.5 TABLET, FILM COATED ORAL at 21:25

## 2019-01-01 RX ADMIN — ANASTROZOLE 1 MG: 1 TABLET ORAL at 09:53

## 2019-01-01 RX ADMIN — SIMVASTATIN 40 MG: 20 TABLET, FILM COATED ORAL at 03:00

## 2019-01-01 RX ADMIN — Medication 2 G: at 20:20

## 2019-01-01 RX ADMIN — OMEPRAZOLE 40 MG: 20 CAPSULE, DELAYED RELEASE ORAL at 09:44

## 2019-01-01 RX ADMIN — CEFTRIAXONE SODIUM 2 G: 2 INJECTION, POWDER, FOR SOLUTION INTRAMUSCULAR; INTRAVENOUS at 14:04

## 2019-01-01 RX ADMIN — ANASTROZOLE 1 MG: 1 TABLET ORAL at 08:14

## 2019-01-01 RX ADMIN — ASPIRIN 81 MG: 81 TABLET, COATED ORAL at 07:52

## 2019-01-01 RX ADMIN — CALCIUM GLUCONATE 1 G: 98 INJECTION, SOLUTION INTRAVENOUS at 12:13

## 2019-01-01 RX ADMIN — ANASTROZOLE 1 MG: 1 TABLET ORAL at 07:52

## 2019-01-01 RX ADMIN — DEXTROSE MONOHYDRATE 25 G: 25 INJECTION, SOLUTION INTRAVENOUS at 23:39

## 2019-01-01 RX ADMIN — SODIUM BICARBONATE 50 MEQ: 84 INJECTION, SOLUTION INTRAVENOUS at 23:39

## 2019-01-01 RX ADMIN — WARFARIN SODIUM 2.5 MG: 2.5 TABLET ORAL at 18:54

## 2019-01-01 RX ADMIN — MICONAZOLE NITRATE: 2 POWDER TOPICAL at 09:12

## 2019-01-01 RX ADMIN — SODIUM CHLORIDE 1000 ML: 9 INJECTION, SOLUTION INTRAVENOUS at 02:06

## 2019-01-01 RX ADMIN — SIMVASTATIN 40 MG: 20 TABLET, FILM COATED ORAL at 21:25

## 2019-01-01 RX ADMIN — CIPROFLOXACIN HYDROCHLORIDE 250 MG: 250 TABLET, FILM COATED ORAL at 09:44

## 2019-01-01 RX ADMIN — OMEPRAZOLE 20 MG: 20 CAPSULE, DELAYED RELEASE ORAL at 08:06

## 2019-01-01 RX ADMIN — CEFTRIAXONE SODIUM 2 G: 2 INJECTION, POWDER, FOR SOLUTION INTRAMUSCULAR; INTRAVENOUS at 11:36

## 2019-01-01 RX ADMIN — SODIUM POLYSTYRENE SULFONATE 30 G: 15 SUSPENSION ORAL; RECTAL at 21:10

## 2019-01-01 RX ADMIN — SODIUM CHLORIDE: 9 INJECTION, SOLUTION INTRAVENOUS at 23:04

## 2019-01-01 RX ADMIN — MICONAZOLE NITRATE: 2 POWDER TOPICAL at 08:15

## 2019-01-01 RX ADMIN — OMEPRAZOLE 40 MG: 20 CAPSULE, DELAYED RELEASE ORAL at 08:15

## 2019-01-01 RX ADMIN — VITAMIN D, TAB 1000IU (100/BT) 1000 UNITS: 25 TAB at 17:25

## 2019-01-01 RX ADMIN — MIRTAZAPINE 15 MG: 15 TABLET, FILM COATED ORAL at 21:14

## 2019-01-01 RX ADMIN — SIMVASTATIN 40 MG: 20 TABLET, FILM COATED ORAL at 21:15

## 2019-01-01 RX ADMIN — FUROSEMIDE 40 MG: 10 INJECTION, SOLUTION INTRAVENOUS at 22:06

## 2019-01-01 RX ADMIN — ANASTROZOLE 1 MG: 1 TABLET ORAL at 09:10

## 2019-01-01 RX ADMIN — HUMAN INSULIN 5 UNITS: 100 INJECTION, SOLUTION SUBCUTANEOUS at 23:39

## 2019-01-01 RX ADMIN — WARFARIN SODIUM 3 MG: 3 TABLET ORAL at 18:30

## 2019-01-01 RX ADMIN — VITAMIN D, TAB 1000IU (100/BT) 1000 UNITS: 25 TAB at 07:52

## 2019-01-01 RX ADMIN — ANASTROZOLE 1 MG: 1 TABLET ORAL at 08:03

## 2019-01-01 RX ADMIN — SODIUM CHLORIDE 500 ML: 9 INJECTION, SOLUTION INTRAVENOUS at 15:42

## 2019-01-01 RX ADMIN — HUMAN INSULIN 5.4 UNITS: 100 INJECTION, SOLUTION SUBCUTANEOUS at 22:40

## 2019-01-01 RX ADMIN — MIRTAZAPINE 15 MG: 15 TABLET, FILM COATED ORAL at 22:05

## 2019-01-01 RX ADMIN — SODIUM CHLORIDE, POTASSIUM CHLORIDE, SODIUM LACTATE AND CALCIUM CHLORIDE 500 ML: 600; 310; 30; 20 INJECTION, SOLUTION INTRAVENOUS at 15:00

## 2019-01-01 RX ADMIN — Medication 1 CAPSULE: at 12:51

## 2019-01-01 RX ADMIN — SODIUM CHLORIDE 1000 ML: 9 INJECTION, SOLUTION INTRAVENOUS at 21:26

## 2019-01-01 RX ADMIN — OMEPRAZOLE 40 MG: 20 CAPSULE, DELAYED RELEASE ORAL at 17:25

## 2019-01-01 RX ADMIN — ACETAMINOPHEN 975 MG: 325 TABLET, FILM COATED ORAL at 08:02

## 2019-01-01 RX ADMIN — ANASTROZOLE 1 MG: 1 TABLET ORAL at 17:25

## 2019-01-01 RX ADMIN — CEFTRIAXONE SODIUM 1 G: 1 INJECTION, POWDER, FOR SOLUTION INTRAMUSCULAR; INTRAVENOUS at 00:28

## 2019-01-01 RX ADMIN — CALCIUM GLUCONATE 1 G: 98 INJECTION, SOLUTION INTRAVENOUS at 23:16

## 2019-01-01 RX ADMIN — MIRTAZAPINE 15 MG: 15 TABLET, FILM COATED ORAL at 20:20

## 2019-01-01 RX ADMIN — ASPIRIN 81 MG: 81 TABLET, COATED ORAL at 17:42

## 2019-01-01 RX ADMIN — DEXTROSE 50 % IN WATER (D50W) INTRAVENOUS SYRINGE 25 G: at 22:40

## 2019-01-01 RX ADMIN — SODIUM CHLORIDE: 9 INJECTION, SOLUTION INTRAVENOUS at 17:23

## 2019-01-01 RX ADMIN — DEXTROSE MONOHYDRATE 75 ML/HR: 100 INJECTION, SOLUTION INTRAVENOUS at 00:25

## 2019-01-01 RX ADMIN — ATORVASTATIN CALCIUM 40 MG: 40 TABLET, FILM COATED ORAL at 20:19

## 2019-01-01 RX ADMIN — CIPROFLOXACIN HYDROCHLORIDE 500 MG: 250 TABLET, FILM COATED ORAL at 08:15

## 2019-01-01 RX ADMIN — SODIUM CHLORIDE 1000 ML: 9 INJECTION, SOLUTION INTRAVENOUS at 20:08

## 2019-01-01 RX ADMIN — OMEPRAZOLE 40 MG: 20 CAPSULE, DELAYED RELEASE ORAL at 07:51

## 2019-01-01 RX ADMIN — MICONAZOLE NITRATE: 2 POWDER TOPICAL at 12:51

## 2019-01-01 RX ADMIN — WARFARIN SODIUM 2.5 MG: 2.5 TABLET ORAL at 17:25

## 2019-01-01 RX ADMIN — SODIUM CHLORIDE: 9 INJECTION, SOLUTION INTRAVENOUS at 14:06

## 2019-01-01 RX ADMIN — MAGNESIUM SULFATE IN DEXTROSE 1 G: 10 INJECTION, SOLUTION INTRAVENOUS at 15:41

## 2019-01-01 RX ADMIN — ACETAMINOPHEN 975 MG: 325 TABLET, FILM COATED ORAL at 14:57

## 2019-01-01 RX ADMIN — SIMVASTATIN 40 MG: 20 TABLET, FILM COATED ORAL at 22:05

## 2019-01-01 RX ADMIN — CIPROFLOXACIN HYDROCHLORIDE 250 MG: 250 TABLET, FILM COATED ORAL at 09:56

## 2019-01-01 RX ADMIN — DEXTROSE MONOHYDRATE: 100 INJECTION, SOLUTION INTRAVENOUS at 23:24

## 2019-01-01 SDOH — HEALTH STABILITY: MENTAL HEALTH: HOW OFTEN DO YOU HAVE A DRINK CONTAINING ALCOHOL?: NEVER

## 2019-01-01 ASSESSMENT — ACTIVITIES OF DAILY LIVING (ADL)
SWALLOWING: 0-->SWALLOWS FOODS/LIQUIDS WITHOUT DIFFICULTY
ADLS_ACUITY_SCORE: 16
BATHING: 1-->ASSISTIVE EQUIPMENT
ADLS_ACUITY_SCORE: 18
RETIRED_EATING: 0-->INDEPENDENT
ADLS_ACUITY_SCORE: 17
ADLS_ACUITY_SCORE: 17
ADLS_ACUITY_SCORE: 16
ADLS_ACUITY_SCORE: 16
TOILETING: 1-->ASSISTIVE EQUIPMENT
ADLS_ACUITY_SCORE: 16
ADLS_ACUITY_SCORE: 15
NUMBER_OF_TIMES_PATIENT_HAS_FALLEN_WITHIN_LAST_SIX_MONTHS: 3
AMBULATION: 1-->ASSISTIVE EQUIPMENT
ADLS_ACUITY_SCORE: 18
ADLS_ACUITY_SCORE: 19
ADLS_ACUITY_SCORE: 15
ADLS_ACUITY_SCORE: 18
WHICH_OF_THE_ABOVE_FUNCTIONAL_RISKS_HAD_A_RECENT_ONSET_OR_CHANGE?: FALL HISTORY
ADLS_ACUITY_SCORE: 16
ADLS_ACUITY_SCORE: 17
ADLS_ACUITY_SCORE: 17
ADLS_ACUITY_SCORE: 15
ADLS_ACUITY_SCORE: 18
ADLS_ACUITY_SCORE: 14
ADLS_ACUITY_SCORE: 17
DRESS: 0-->INDEPENDENT
ADLS_ACUITY_SCORE: 16
ADLS_ACUITY_SCORE: 16
COGNITION: 1 - ATTENTION OR MEMORY DEFICITS
ADLS_ACUITY_SCORE: 15
RETIRED_COMMUNICATION: 0-->UNDERSTANDS/COMMUNICATES WITHOUT DIFFICULTY
ADLS_ACUITY_SCORE: 16
ADLS_ACUITY_SCORE: 15
ADLS_ACUITY_SCORE: 18
FALL_HISTORY_WITHIN_LAST_SIX_MONTHS: YES
TRANSFERRING: 1-->ASSISTIVE EQUIPMENT
ADLS_ACUITY_SCORE: 16

## 2019-01-01 ASSESSMENT — ENCOUNTER SYMPTOMS
SHORTNESS OF BREATH: 0
HEADACHES: 0
BACK PAIN: 1
ABDOMINAL PAIN: 0
NAUSEA: 1
COUGH: 0
DIZZINESS: 0
SORE THROAT: 0
FREQUENCY: 1
NUMBNESS: 0
WEAKNESS: 0
FEVER: 0
NECK PAIN: 0
VOMITING: 0
DIARRHEA: 0
BLOOD IN STOOL: 0
RHINORRHEA: 0
LIGHT-HEADEDNESS: 0
DYSURIA: 0

## 2019-01-01 ASSESSMENT — MIFFLIN-ST. JEOR
SCORE: 859.93
SCORE: 869.89
SCORE: 867.64
SCORE: 874.9
SCORE: 824.53

## 2019-01-04 NOTE — PROGRESS NOTES
Clinic Care Coordination Contact    Situation: Patient chart reviewed by care coordinator.    Background: CC following while open to home care to do call after services close.  CC also following spouse for same reason.      Assessment: Spouse has entered hospice and services are provided in their home.  No CC involvement needed at this time as they have comprehensive services through hospice and CC has left a message for daughter previously.     Plan/Recommendations: No further outreach by CC at this time.     Social Vidya Grubbs  [facility]  [email or web address]  [phone]    Clinic Care Coordination Contact  [facility]/Voicemail       Clinical Data: Care Coordinator Outreach- contacted daughter Mercy, consent on file.   Outreach attempted x 1.  Left message on voicemail with call back information and requested return call.  Plan:Care Coordinator will try to reach caregiver again in 3-5 business days.  Social Vidya Grubbs  [facility]  [email or web address]  [phone]

## 2019-05-17 PROBLEM — N39.0 UTI (URINARY TRACT INFECTION): Status: ACTIVE | Noted: 2019-01-01

## 2019-05-17 NOTE — PROGRESS NOTES
[facility] [place]    Progress Note - [place] 1 Service        Date of Admission:  5/16/2019    Assessment & Plan   Graciela Hopkins is a 89 year old female with pmh of breast adenocarcinoma, bladder carcinoma c/b urethreal stenosis, htn, recent death of , here with nausea, muscle weakness, and encephalopathy, found to be hyperkalemic with ADRIANNA, with an ecoli bacteremia.      #Hyperkalemia   #ADRIANNA on CKD   Baseline creatinine 1.5 via chart review, elevated to 2.2 on admission with BUN/Cr ratio approximately 20 therefore likely a combination of prerenal and intrinsic disease. S/p resolution of hyperkalemia with shifting. S/p 2.5L IVF resuscitation.   --BMP in morning  --calorie counts, nutrition consult     #Ecoli UTI  #Hx of bladder cancer c/b urethral stenosis   Hx of bladder carcinoma c/b urethral stenosis. Hx of recurrent UTIs, had been on ppx bactrim, not recently. Pyuria on UA with >100,000 colonies of Ecoli.Remote hx of UC w/ klebsiella    --Ceftriaxone 2g daily, narrow per sensitivities   --Blood culture if febrile   --Bladder scan  --Will need urology follow up  --ppx antibiotics reinitiated after treatment of this UTI     #Normocyctic Anemia   #DENITA  #Hx of gastric antral vascular ectasia (GAVE)    Longstanding hx of DENITA with endoscopy from 11/2018 demonstrating GAVE s/p APC with England's esophagus. Hx of upper GI bleed associated with use of rivoroxaban. Needs intermittent pRBC infusions, baseline hemoglobin 8-9. No evidence of current upper or lower gi bleed.   --gastroenterology consult   Will need surveillance outpatient endoscopy   --increase omeprazole 40mg(previously 20mg)   --Per family request, plans to stay on warfarin (see below)    --Repeat hemoglobin in the morning     #Recurrent deep venous thrombosis   #Hx of Prothrombin gene mutation  Hx of prothrombin gene mutation with recurrent lower extremity thrombosis, lifelong anticoagulation on warfarin. Current  "LE DVT in 12/2018 when anticoagulation stopped. Warfarin difficult to control due to malnutrition, upper gi bleed on rivaroxaban.   --nutrition consult   --continue warfarin     #Depression  #Adjustment Disorder  --per family request, increase mirtazapine to 15(previsuly 7.5)      Chronic Conditions:  #HLD  --Simvastatin 40mg   #HTN  --Hold valsartan - hydrochlorothiazide combination pill   #Hx of breast cancer s/p lumpectomy   --continue anastrozole      Diet: Regular Diet Adult    Fluids: S/p 2.5L IVF, none  Lines: PIV  DVT Prophylaxis: Warfarin  Curtis Catheter: not present  Code Status: Full Code      Disposition Plan   Expected discharge: 2 - 3 days, recommended to prior living arrangement once hemoglobin stable.  Entered: Gabi Benavides MD 05/17/2019, 10:36 AM       The patient's care was discussed with the attending, Dr. Joy.     Gabi Benavides MD  Internal Medicine - Pediatrics PGY3  [place] 1 Service  [facility] [place]  Pager: [phone]  Please see sticky note for cross cover information  ______________________________________________________________________    Interval History   Since admission the patient is up and walking. She reports feeling \"fine\". She is tearful when talks about recent death of . Reports she hopes to live with daughter once she fixes up her home.  Endorses nausea, frequent urination and per daughter \"wacky\" behavior prior to admission, different from her regular cognition.     Further history obtained from chart review. Noted to have longstanding DENITA. In November 2018 admitted to the hospital with black stools and hemoglobin of 6. She was taken for colonoscopy and endoscopy where she was found to have GAVE treated with APC and a khalil's esophagus. She was stopped on her chronic Xarelto (due to recurrent DVT from prothrombin gene mutation) and sent out on PPI. She had recurrence of DVT in left lower extremity popliteal vein in 12/2018 shortly " after stopping anticoagulation.       Data reviewed today: I reviewed all medications, new labs and imaging results over the last 24 hours.     Physical Exam   Vital Signs: Temp: 95.6  F (35.3  C) Temp src: Oral BP: 104/48 Pulse: 82   Resp: 16 SpO2: 93 % O2 Device: None (Room air)    Weight: 117 lbs 9.6 oz  General: NAD, pleasant and cooperative, non toxic, malnourished appearing   HEENT: NC/AT, well injected Conjunctiva, anicteric sclera, EOMI; PERRL, MMM;   Neck: Trachea midline; supple, good tone; full ROM; negative for Lymphoadenopathy, JVD  Chest: CTAB B/L ; no rales  CV: RRR; nl S1/S2, 2/6 systolic murmurs radiates to the carotids  Abd: Soft, NT/ND, no HSM, no masses, (+) BS, no suprapubic tenderness    Ext: Warm/well perfused; no Edema.  Skin: Clear; unbroken; no new rashes  Neuro: - MS: AAO x3 - no focal deficit       Data   Recent Labs   Lab 05/17/19  0715 05/17/19  0555 05/17/19  0205 05/16/19  2321  05/16/19 2014   WBC 6.1 Canceled, Test credited 7.8  --   --  10.5   HGB 7.8* Canceled, Test credited 7.4*  --   --  7.8*   * Canceled, Test credited 100  --   --  96   * Canceled, Test credited 148*  --   --  161   INR  --  2.68*  --   --   --  2.55*   NA  --  144 144  --   --  139   POTASSIUM  --  4.7 4.7 5.8*  5.6*   < > 7.0*   CHLORIDE  --  116* 116*  --   --  112*   CO2  --  20 22  --   --  20   BUN  --  41* 44*  --   --  49*   CR  --  2.12* 2.13*  --   --  2.35*   ANIONGAP  --  7 6  --   --  7   FRANCISCA  --  8.5 8.4*  --   --  8.7   GLC  --  89 55*  --   --  87   ALBUMIN  --   --   --   --   --  3.2*   PROTTOTAL  --   --   --   --   --  5.9*   BILITOTAL  --   --   --   --   --  0.8   ALKPHOS  --   --   --   --   --  47   ALT  --   --   --   --   --  17   AST  --   --   --   --   --  14   LIPASE  --   --   --   --   --  273   TROPI  --   --   --   --   --  <0.015    < > = values in this interval not displayed.

## 2019-05-17 NOTE — ED PROVIDER NOTES
History     Chief Complaint   Patient presents with     Nausea, Vomiting, & Diarrhea     Sick to her stomach for 2 days and hasn't eaten.     HPI  Graciela Hopkins is a 89 year old female who presents due to generalized weakness and confusion.  Patient's daughter contacted her to check on her today and found that she had not been out of bed for 2 days.  She also seemed somewhat disoriented which is not usual for her.  Patient was making reference to her adult children as 5-year-old's.  The patient herself says she has been nauseated and actually vomited 1 or 2 times.  She denies any pain.  She states she has no appetite.  Reports some urinary frequency but no dysuria, fever, chills, flank pain reported.  Denies any trauma.    Past Medical History:   Diagnosis Date     Arthritis      Cancer (H)     bladder     Chronic kidney disease     renal insufficiency     Gastric antral vascular ectasia      Hypertension      Urinary tract infection        I have reviewed the Medications, Allergies, Past Medical and Surgical History, and Social History in the Epic system.    Review of Systems  All other systems were reviewed and are negative    Physical Exam   BP: 127/73  Pulse: 101  Temp: 97.3  F (36.3  C)  Resp: 16  Weight: 53.5 kg (118 lb)  SpO2: 95 %      Physical Exam   Constitutional: No distress.   HENT:   Head: Atraumatic.   Mouth/Throat: Oropharynx is clear and moist. Mucous membranes are dry. No oropharyngeal exudate.   Eyes: Pupils are equal, round, and reactive to light. No scleral icterus.   Cardiovascular: Normal heart sounds and intact distal pulses.   Pulmonary/Chest: Breath sounds normal. No respiratory distress.   Abdominal: Soft. Bowel sounds are normal. There is no tenderness.   Musculoskeletal: She exhibits no edema or tenderness.   Neurological: She is alert. She has normal strength. She is disoriented. No sensory deficit.   Skin: Skin is warm. No rash noted. She is not diaphoretic.   Psychiatric: Her  speech is normal. She is withdrawn. She exhibits a depressed mood.       ED Course        Procedures             EKG Interpretation:      Interpreted by Jitendra Gray  Time reviewed: 2111  Symptoms at time of EKG: Hyperkalemia  Rhythm: normal sinus   Rate: Normal  Axis: Normal  Ectopy: none  Conduction: normal  ST Segments/ T Waves: No ST-T wave changes, No acute ischemic changes and T wave peaking V2 and V3  Q Waves: Low voltage QRS  Comparison to prior: No old EKG available    Clinical Impression: Normal sinus rhythm with low voltage QRS.  Poor R wave progression.  Slight peaking of T waves V2 V3.                Critical Care time:  was 30 minutes for this patient excluding procedures.             Labs Ordered and Resulted from Time of ED Arrival Up to the Time of Departure from the ED   CBC WITH PLATELETS DIFFERENTIAL - Abnormal; Notable for the following components:       Result Value    RBC Count 2.53 (*)     Hemoglobin 7.8 (*)     Hematocrit 24.4 (*)     All other components within normal limits   COMPREHENSIVE METABOLIC PANEL - Abnormal; Notable for the following components:    Potassium 7.0 (*)     Chloride 112 (*)     Urea Nitrogen 49 (*)     Creatinine 2.35 (*)     GFR Estimate 17 (*)     GFR Estimate If Black 20 (*)     Albumin 3.2 (*)     Protein Total 5.9 (*)     All other components within normal limits   INR - Abnormal; Notable for the following components:    INR 2.55 (*)     All other components within normal limits   PARTIAL THROMBOPLASTIN TIME - Abnormal; Notable for the following components:    PTT 39 (*)     All other components within normal limits   LACTIC ACID WHOLE BLOOD - Abnormal; Notable for the following components:    Lactic Acid 0.6 (*)     All other components within normal limits   ROUTINE UA WITH MICROSCOPIC REFLEX TO CULTURE - Abnormal; Notable for the following components:    Protein Albumin Urine 10 (*)     Leukocyte Esterase Urine Large (*)     WBC Urine 109 (*)     RBC Urine 5  (*)     Bacteria Urine Few (*)     All other components within normal limits   POTASSIUM - Abnormal; Notable for the following components:    Potassium 6.9 (*)     All other components within normal limits   LIPASE   TROPONIN I   TSH WITH FREE T4 REFLEX   POTASSIUM   BLOOD GAS VENOUS   POTASSIUM   POTASSIUM   CARDIAC CONTINUOUS MONITORING   ASSESS FOR HYPOGLYCEMIA SYMPTOMS   NOTIFY PHYSICIAN   ABO/RH TYPE AND SCREEN   URINE CULTURE AEROBIC BACTERIAL            Assessments & Plan (with Medical Decision Making)   Patient with a history of hypertension and GI bleed due to gastric ectasia.  She presents tonight with 2 days of generalized weakness, urinary frequency, and some altered mental status.  On exam she had normal vital signs place.  Afebrile.  Mucous membranes dry.  Supple neck.  No focal neurologic deficits.  Benign abdominal exam.  The remainder of a complete physical exam is normal.  Her initial labs reveal elevated BUN and creatinine significantly changed from previous with a potassium of 7.0.  Quickly rechecked with the lab and confirmed this result.  An EKG shows low QRS voltage and slight peaking of the T waves in anterior precordial leads but otherwise unremarkable.  Her urinalysis is consistent with an infection.  She was treated with IV fluids, Lasix, Kayexalate, and potassium shifting using insulin/D5/bicarb as per protocol.  Repeat potassium is 5.6.  She remains hemodynamically stable and improving.  Discussed with the [place] triage physician who accepts the patient to the medicine service.  Patient and family are in agreement with transfer to the [place] [facility].  She appears stable for transfer.  Total critical care time includes time spent initially at the bedside, abscess, discussion with total critical care time 30 minutes.    I have reviewed the nursing notes.    I have reviewed the findings, diagnosis, plan and need for follow up with the patient.       Medication List      There are no discharge  medications for this visit.         Final diagnoses:   Hyperkalemia   Acute renal failure, unspecified acute renal failure type (H)   Dehydration       5/16/2019   [facility], [place], EMERGENCY DEPARTMENT     Jitendra Gray MD  05/17/19 0011

## 2019-05-17 NOTE — PLAN OF CARE
Pt transferred from [facility] around 0420. Pt alert but forgetful -- disoriented to time. Very redirectable and can follow commands. BPs slightly soft since admission, 90s/40s-- asymptomatic. On room air. NS infusing at 125mL/hr per order. Denies pain or discomfort at this time. Trending hgb and potasium with AM labs. No noted hematochezia or melena. Bed alarm on for safety -- can able to make appropriate requests. Will continue to monitor as noted and follow plan of care.     Sergei Alvarado RN [phone]

## 2019-05-17 NOTE — PROGRESS NOTES
Care Coordinator Progress Note    Admission Date/Time:  5/16/2019  Attending MD:  Edwina Joy MD    Data  Chart reviewed, discussed with interdisciplinary team.   Patient was admitted for:    Hyperkalemia  Acute renal failure, unspecified acute renal failure type (H)  Dehydration.    Concerns with insurance coverage for discharge needs: None.  Current Living Situation: Patient lives alone ([facility]).  Support System: Supportive and Involved  Services Involved: Homemaking Services, Anticoagulation ([facility])  Transportation at Discharge: Family or friend will provide  Transportation to Medical Appointments:   - Name of caregiver: Leona Plascencia, daughter  Barriers to Discharge: Medical Stability    Coordination of Care and Referrals: Provided patient/family with options for Home Care.       Per the team, patient will likely discharge in 2-3 days. Inpatient therapy recommendations are pending.    Writer met with patient to introduce the role of the care coordinator and assess discharge needs. Per patient, she is independent at home and only utilizes homemaking services through her senior living apartment. She is not currently open to home care services. She does not feel she would benefit from RN, PT or OT visits. Writer encouraged patient to ask to talk with the care coordinator if she has any questions or concerns regarding discharge planning.      Plan  Anticipated Discharge Date:  5/19/2019  Anticipated Discharge Plan:  Home with clinic follow up as recommended by the team    LEA Grande

## 2019-05-17 NOTE — PLAN OF CARE
/54 (BP Location: Left arm)   Pulse 91   Temp 97.7  F (36.5  C) (Oral)   Resp 18   Ht 1.524 m (5')   Wt 53.3 kg (117 lb 9.6 oz)   SpO2 99%   BMI 22.97 kg/m      Pt [facility] from [place] [facility] around 0140. Admitted with ADRIANNA and hyperkalemia.     A/Ox4. Withdrawn affect. Pt states her   in March and she has not had an appetite or energy to do things since. She states she feels lonely living in her senior living facility. VSS on RA. Tele shows sinus rhythm. BG 55. Pt had NPO orders so 25 mL D50 given and BG went up to 115. Currently hasna regular diet.Up with SBA and cane.  Pt denies pain, nausea, or SOB. Skin intact. Voided and had a small, loose BM. No blood noted. Potassium 4.7, Hgb 7.4, Creatinine 2.13.   2 RN skin assessment completed by Shimon.    Transfer orders to . Will continue to monitor and follow POC.     4:21 AM  Pt transferred to [facility]. Report given to ERIS High. Belongings and chart sent with pt.

## 2019-05-17 NOTE — PHARMACY-ANTICOAGULATION SERVICE
Clinical Pharmacy - Warfarin Dosing Consult     Pharmacy has been consulted to manage this patient s warfarin therapy.  Indication: DVT/ PE Treatment  Therapy Goal: INR 2-3  OP Anticoag Clinic: [facility]  Warfarin Prior to Admission: Yes  Warfarin PTA Regimen: 3.75 mg Mon, 2.5 mg ROW  Significant drug interactions: simvastatin, mirtazapine, omeprazole  Recent documented change in oral intake/nutrition: Yes  Dose Comments: Poor oral intake over past week    INR   Date Value Ref Range Status   05/17/2019 2.68 (H) 0.86 - 1.14 Final   05/16/2019 2.55 (H) 0.86 - 1.14 Final       Recommend warfarin 2.5 mg today (resume home dosing).  Pharmacy will monitor Graciela Hopkins daily and order warfarin doses to achieve specified goal.      Please contact pharmacy as soon as possible if the warfarin needs to be held for a procedure or if the warfarin goals change.      Laura Robbins, Pharm.D.  PGY-1 Pharmacy Practice Resident

## 2019-05-17 NOTE — H&P
History and Physical  Graciela Hopkins MRN: [id number]  Age: 89 year old, : 1925  Primary care provider: Car Radford           Chief Complaint:     Weakness          History of Present Illness:     HISTORY OF PRESENT ILLNESS:  Graciela Hopkins is a 89 year old with PMH that is pertinent for HTN, bladder cancer, who is presented to [facility] ED with weakness and confusion , she was found to have ADRIANNA and hyperkalemia.      Patient reports that over the past two days she has just been feeling week with poor PO intake. She also noticed increase urinary frequency. Other than that she denies recent UTI, chest pain, SOB, fevers, chills, abdominal pain , rash , joint swelling. Her  of 56 years passed of 2 years ago and she has been trying to cope with it since then. Per daughter she has been eating less since that time. Her daughter is an ED physician on [place].      Upon arrival to the ED patient was tachycardic , /73, Spo2 95% on RA. Labs were pertinent K 6.9, Cr 2.35, Hgb 7.8 (baseline around 11.4). Patient was given 2 L of NS , 40 IV lasix, kayexalate , insulin/d50, sodium bicarbonate for hyperkalemia , Ceftriaxone for UTI, and transferred to [facility] for further evaluation.                 Past Medical History:     Reviewed with patient on 2019   Past Medical History:   Diagnosis Date     Arthritis      Cancer (H)     bladder     Chronic kidney disease     renal insufficiency     Gastric antral vascular ectasia      Hypertension      Urinary tract infection        Past Surgical History:   Procedure Laterality Date     BREAST SURGERY      lumpectomy     ORTHOPEDIC SURGERY      double total knee             Social History:     Social History     Tobacco Use     Smoking status: Not on file   Substance Use Topics     Alcohol use: Not on file      Lives at a senior living   Non smoker , non alcohol , no drug           Family History:     No family history on file.  Family history            Allergies:     Allergies   Allergen Reactions     Macrobid [Nitrofurantoin]      Xarelto [Rivaroxaban]             ROS : 10 point review of systems was performed and was negative other than HPI         Medications:     PTA Meds  Prior to Admission medications    Medication Sig Last Dose Taking? Auth Provider   anastrozole (ARIMIDEX) 1 MG tablet Take 1 mg by mouth daily 5/15/2019 at Unknown time Yes Reported, Patient   mirtazapine (REMERON) 7.5 MG tablet Take 7.5 mg by mouth At Bedtime 5/15/2019 at Unknown time Yes Reported, Patient   omeprazole (PRILOSEC) 20 MG DR capsule Take 20 mg by mouth daily 5/15/2019 at Unknown time Yes Reported, Patient   simvastatin (ZOCOR) 40 MG tablet Take 40 mg by mouth At Bedtime 5/15/2019 at Unknown time Yes Reported, Patient   valsartan-hydrochlorothiazide (DIOVAN HCT) 80-12.5 MG tablet Take 1 tablet by mouth daily 5/15/2019 at Unknown time Yes Reported, Patient   warfarin (COUMADIN) 1 MG tablet Take 2.5 mg by mouth daily 5/15/2019 at Unknown time Yes Reported, Patient   sulfamethoxazole-trimethoprim (BACTRIM DS/SEPTRA DS) 800-160 MG tablet Take 1 tablet by mouth 2 times daily More than a month at Unknown time  Reported, Patient      Current Meds    cefTRIAXone  1 g Intravenous Once     furosemide  20 mg Intravenous Once     Infusion Meds    dextrose 75 mL/hr at 05/17/19 0002     sodium chloride 125 mL/hr at 05/17/19 0004       ALLERGIES:    Allergies   Allergen Reactions     Macrobid [Nitrofurantoin]      Xarelto [Rivaroxaban]                  Physical Exam:     B/P: 116/59, T: 97.3, P: 97, R: 16    Wt Readings from Last 4 Encounters:   05/16/19 53.5 kg (118 lb)     General: NAD, resting comfortably on exam, appears stated age,pleasant and cooperative, AAOx3.  HEENT: NC/AT, well injected Conjunctiva, anicteric sclera, EOMI; PERRL, MMM;   Neck: Trachea midline; supple, good tone; full ROM; negative for Lymphoadenopathy, JVD  Chest: CTAB B/L ; no rales  CV: RRR; nl S1/S2, 2/6  systolic murmurs radiates to the carotids  Abd: Soft, NT/ND, no HSM, no masses, (+) BS;   Ext: Warm/well perfused; no Edema.  Skin: Clear; unbroken; no new rashes  Neuro: - MS: AAO x3 - no focal deficit             Data:       DIAGNOSTIC STUDIES  ROUTINE ICU LABS (Last four results)  CMP  Recent Labs   Lab 05/17/19 0205 05/16/19  2321 05/16/19  2106 05/16/19 2014     --   --  139   POTASSIUM 4.7 5.8*  5.6* 6.9* 7.0*   CHLORIDE 116*  --   --  112*   CO2 22  --   --  20   ANIONGAP 6  --   --  7   GLC 55*  --   --  87   BUN 44*  --   --  49*   CR 2.13*  --   --  2.35*   GFRESTIMATED 19*  --   --  17*   GFRESTBLACK 22*  --   --  20*   FRANCISCA 8.4*  --   --  8.7   PROTTOTAL  --   --   --  5.9*   ALBUMIN  --   --   --  3.2*   BILITOTAL  --   --   --  0.8   ALKPHOS  --   --   --  47   AST  --   --   --  14   ALT  --   --   --  17     CBC  Recent Labs   Lab 05/17/19 0205 05/16/19 2014   WBC 7.8 10.5   RBC 2.40* 2.53*   HGB 7.4* 7.8*   HCT 24.1* 24.4*    96   MCH 30.8 30.8   MCHC 30.7* 32.0   RDW 14.4 14.3   * 161     INR  Recent Labs   Lab 05/16/19 2014   INR 2.55*     Arterial Blood Gas  Recent Labs   Lab 05/16/19  2321   O2PER 21       IMAGING  EKG 5/16: NSR - ST changes         Assessment and Plan:     Graciela Hopkins is a 89 year old with PMH that is pertinent for HTN, bladder cancer, breast cancer (s/p lumpectomy), DVT, who is presented to  ED with weakness and confusion , she was found to have ADRIANNA and hyperkalemia.    #Acute Kidney Injury  Likely pre-renal in the setting of UTI and acute on chronic anemia.   - s/p 2 L of NS --> give additional 500 ml then stop   - Trend Cr daily  - Avoid hypotension , keep MAP >65  - Hold PTA hydrochlorothiazide-Valsartan    #Hyperkalemia   EKG with T-wave changes. S/p shifting , IV diuresis and kayexelate. Improved to 5.6 --> repeat is 4.7  - Trend Q4h   - No urgent need for HD   - Telemetry  - s/p 2.5 L of IVF , 20 IV lasix , 30 mg Kayexalate , shifting  with insulin+D10    #Non-complicated UTI   Has recurrent UTIs given history of bladder CI and ureter stricture. Usually takes 1 week of  Bactrim first week of everymonth  - Ceftriaxone for now   - Avoid bactrim given hyper K     #Acute on chronic anemia   Upper GI endoscopy in the past revealed gastric antral vascular ectasia. Suspect slow GI oozing in the setting of hisory of GI bleed and being on anticoagulation. Low reticulocyte count, no melena.     - Hold Warfarin, no reversal for now   - Trend Hgb Q12h  - Continue Oral PPI   - Consider GI evaluation in the AM     #Unprovoked DVT   Diagnosed on 12/2018. On warfarin.  - Hold warfarin   - INR daily     #Breast cancer s/p lumpectomy   - Continue Anastrozole     Prophylaxis:  DVT: SCDs GI: PPI as above  Family: daughter at bedside   Disposition: expect 2-3 days hospital stay =  Code Status: Full for this hospital stay (given hyper K) - prior is DNR/DNI     Patient to be staffed in the AM.     Lu Dhillon MD  Internal Medicine, PGY-3  Pager [phone]

## 2019-05-17 NOTE — PROGRESS NOTES
"CLINICAL NUTRITION SERVICES - ASSESSMENT NOTE     Nutrition Prescription    RECOMMENDATIONS FOR MDs/PROVIDERS TO ORDER:  Continue with diet as tolerated     Malnutrition Status:    Non-severe malnutrition in the context of acute illness     Recommendations already ordered by Registered Dietitian (RD):  Boost glucose control with meals     Future/Additional Recommendations:  None      REASON FOR ASSESSMENT  Graciela Hopkins is a/an 89 year old female assessed by the dietitian for Admission Nutrition Risk Screen for unintentional loss of 10# or more in the past two months and reduced oral intake over the last month    Chart reviewed:   PMH that is pertinent for HTN, bladder cancer, who is presented to  ED with weakness and confusion , she was found to have ADRIANNA and hyperkalemia.    NUTRITION HISTORY  Unable to talk to patient, busy with providers. Information Obtained from chart review:   \" Patient reports that over the past two days she has just been feeling week with poor PO intake\"    Per providers note, patient's  of 56 years passed of 2 years ago and she has been trying to cope with it since then. Per daughter she has been eating less since that time.\"       CURRENT NUTRITION ORDERS  Diet: Regular  Intake/Tolerance:unable to assess       LABS  K+ (WNL)  BUN 41 (H); Cr 2.12 (H); GFR 19 (L)  CRP 62 (H); WBC (WNL)  Ketone urine: Negative on 5/16      MEDICATIONS  Lasix  Humulin; Novolin  Remeron;  Zofran;    ANTHROPOMETRICS  Height: 152.4 cm (5' 0\")  Most Recent Weight: 53.3 kg (117 lb 9.6 oz), on 05/17  IBW: 45 kg - 118%IBW  BMI: 22.97 Normal BMI  Weight History:   Wt Readings from Last 15 Encounters:   05/17/19 53.3 kg (117 lb 9.6 oz)     Dosing Weight: 53 kg - based on admit weight.    ASSESSED NUTRITION NEEDS  Estimated Energy Needs: 9386-2740 kcals/day (25 - 30 kcals/kg)  Justification: Maintenance  Estimated Protein Needs: 42- 53 grams protein/day (0.8 - 1 grams of pro/kg)  Justification: " CKD  Estimated Fluid Needs:  (1 mL/kcal)   Justification: Maintenance    PHYSICAL FINDINGS  See malnutrition section below.    MALNUTRITION  % Intake: </=75% for >/= 1 month (severe)  % Weight Loss: Unable to assess  Subcutaneous Fat Loss: None observed  Muscle Loss: Likely mild/moderate sarcopenia   Fluid Accumulation/Edema: None noted  Malnutrition Diagnosis: Non-severe malnutrition in the context of acute illness     NUTRITION DIAGNOSIS  Inadequate oral intake related to likely confusion and weakness, inhibiting ability to take sufficient po as evidenced by patients presentation     INTERVENTIONS  Implementation  Nutrition Education: Not appropriate at this time due to patient condition   Medical food supplement therapy :Ordered boost glucose control with meals     Goals  Patient to consume % of nutritionally adequate meal trays TID, or the equivalent with supplements/snacks.     Monitoring/Evaluation  Progress toward goals will be monitored and evaluated per protocol.    Renae Lester RD/YANETH  Pager [phone]

## 2019-05-17 NOTE — CONSULTS
GASTROENTEROLOGY CONSULTATION      Date of Admission:  5/16/2019  Reason for consult: Acute on chronic anemia          ASSESSMENT AND RECOMMENDATIONS:   Assessment:  89 year old female with a history of Prothrombin gene mutation and hypercoagulability, recent DVT on 12/2018, UGI bleed with EGD in 11/2018 notable for  England's esophagus, large hiatal hernia and GAVE who presented to ED with weakness, confusion, ADRIANNA, Hyperkalemia and acute on chronic anemia (9.7 03/2019 -> 7.8 05/16/2019) without clear evidence of blood loss.     # Acute on Chronic normocytic anemia   Patient with history of presumed blood loss anemia in 11/2018 thought to be from oozing in the setting of GAVE (also with Barrets esophagus on EGD) who presents with acute on chronic anemia without clear evidence of blood loss. She denies any overt evidence of GI bleed such as melena, hematochezia, BRBPR. Per nursing reports they have not visualized any clear melena since admission. She remains hemodynamically stable at this time (some BP at 77/40 are erroneous given large cuff size used for measurement; confirmed with RN). The likely etiology of her progressive anemia is oozing from the same GAVE; alternate etiologies such as new pud, angiectasias or avms are less likely.     Notably, she was on anticoagulation for DVT diagnosed in 12/2018 and was started on coumadin for such. Given her prothrombin gene mutation,presumably she is at high risk for ongoing clotting and it appears she was only completely off anticoagulation for ~1month time prior to a new diagnosis of DVT. Her GAVE, although it was cauterized at prior EGD, still carries the risk of ongoing oozing especially in the setting of therapeutic anticoagulation.    Recommendations  - Continue PO PPI; 40mg Omeprazole Qday.   - No urgent indication for EGD at this time however will require surveillance endoscopy for GAVE in upcoming 6-8 weeks. (We will arrange for this follow up)  - Please  record stool number, consistency and color.   - If she has hemodynamically significant GI bleed, please page GI service.   - Anticoagulation per primary team.      Thank you for involving us in this patient's care. Please do not hesitate to contact the GI service with any questions or concerns.     Pt care plan discussed with Dr. Gigi Call MD   IM PGY 2   [phone]    ATTENDING ATTESTATION:    REFERRING PROVIDER: Kennedy  DATE SEEN: 5/17/19    Patient was discussed, seen, and examined by me, Earnest Yu. The plan of care and pertinent data/imaging were also reviewed with the GI Consult team and GI Fellow as well. Agree with the joint assessment and plan as delineated above.    Please contact me with any further questions.    Earnest Yu MD    [facility] - Department of Medicine  Division of Gastroenterology  [phone]    -------------------------------------------------------------------------------------------------------------------          Chief Complaint:   We were asked by  of Medicine to evaluate this patient with anemia.  History is obtained from the patient and the medical record.          History of Present Illness:   Graciela Hopkins is a 89 year old female with a history of hypertension, bladder cancer, who presented to [facility] emergency department with weakness as well as confusion initially found to have ADRIANNA as well as hyperkalemia.      We are consulted for Acute on chronic anemia and possible repeat endoscopy.    She has a history of black tarry stools as well as a hemoglobin of 6.1 as of 11/2/2018.  She was admitted to [facility] at the time.  She was also on anticoagulation with Xarelto prior to admission due to history of deep vein thrombosis with prothrombin gene mutation.  During admission, Xarelto was discontinued.  She was transfused with 2 units of packed red blood cells for a Hgb of 6.0.  Ultimately, she  underwent a diagnostic upper endoscopy as well as colonoscopy under anesthesia.  Upper endoscopy reportedly revealed changes consistent with England's esophagus from 25 to 30 cm.  This was biopsied in addition a large hiatal hernia was noted and gastric antral vascular ectasia was also noted.  Colonoscopy revealed a number of diverticuli.  It was thought that her blood loss was most likely due to gave.  She was recommended to be on a proton pump inhibitor indefinitely.  Since this time however, the patient has been restarted on Coumadin presumably for an unprovoked DVT that was diagnosed in 12/2018.     ell as hyperkalemia found to be weak and confused at home.  She now presents on 5/16/2019 with ADRIANNA as w  Initial vital signs were notable for tachycardia to 101, blood pressure is 127/73.  She was on room air.  Since then, her blood pressure has been steadily downtrending with most recent blood pressure with a value of 76/41 however she is not tachycardic with a pulse of 74.    Initial labs were notable for hemoglobin of 7.8 with most recent value prior to this admission was 9.7 on 3/18/2019.  MCV was 96.  Initial iron studies show a ferritin of 139, iron of 24, iron binding capacity of 234 and an iron saturation index of 10 with a transferrin of 147.  Her iron level is low however ferritin is normal.  B12 is normal at 360.  Folate is normal at 15.4.  She also presented with significant hyperkalemia to 7.0. With a creatinine of 2.35; baselines are around 1.5.  Hyperkalemia was treated with normal saline as well as shifting.  She was also given Lasix as well as Kayexalate.  She was continued on her oral PPI and her warfarin was held.            Past Medical History:   Reviewed and edited as appropriate  Past Medical History:   Diagnosis Date     Arthritis      Cancer (H)     bladder     Chronic kidney disease     renal insufficiency     Gastric antral vascular ectasia      Hypertension      Urinary tract infection              Past Surgical History:   Reviewed and edited as appropriate   Past Surgical History:   Procedure Laterality Date     BREAST SURGERY      lumpectomy     ORTHOPEDIC SURGERY      double total knee            Previous Endoscopy:   11/5/2018  - Colonoscopy and Upper endoscopy     Endoscopy: Upper GI endoscopy revealed changes consistent with Alaniz's esophagus from 25-30 cm. This area was biopsied with results pending. Large hiatal hernia was noted. Gastric antral vascular ectasia, (watermelon stomach), also was noted.    Colonoscopy: Findings: Several large-mouthed diverticula in the sigmoid and descending colon; 2-3 mm cecal polyp removed by cold biopsy forceps; 1.0 cm flat polyp in the hepatic flexure , biopsied and not removed    A) DISTAL ESOPHAGUS BIOPSY:      - CHRONIC AND FOCAL ACUTE INFLAMMATION      - ALCIAN BLUE STAIN POSITIVE FOR INTESTINAL METAPLASIA/ALANIZ'S ESOPHAGUS       - NO EVIDENCE OF HIGH GRADE DYSPLASIA OR CARCINOMA    B) CECAL POLYP BIOPSY:      - INFLAMED TUBULAR ADENOMA    C) HEPATIC FLEXURE POLYP BIOPSY:      - INFLAMED TUBULAR ADENOMA           Social History:   Reviewed and edited as appropriate  Social History     Socioeconomic History     Marital status:      Spouse name: Not on file     Number of children: Not on file     Years of education: Not on file     Highest education level: Not on file   Occupational History     Not on file   Social Needs     Financial resource strain: Not on file     Food insecurity:     Worry: Not on file     Inability: Not on file     Transportation needs:     Medical: Not on file     Non-medical: Not on file   Tobacco Use     Smoking status: Not on file   Substance and Sexual Activity     Alcohol use: Not on file     Drug use: Not on file     Sexual activity: Not on file   Lifestyle     Physical activity:     Days per week: Not on file     Minutes per session: Not on file     Stress: Not on file   Relationships     Social connections:      Talks on phone: Not on file     Gets together: Not on file     Attends Confucianism service: Not on file     Active member of club or organization: Not on file     Attends meetings of clubs or organizations: Not on file     Relationship status: Not on file     Intimate partner violence:     Fear of current or ex partner: Not on file     Emotionally abused: Not on file     Physically abused: Not on file     Forced sexual activity: Not on file   Other Topics Concern     Not on file   Social History Narrative     Not on file            Family History:   Reviewed and edited as appropriate  No known history of gastrointestinal/liver disease or  gastrointestinal malignancies       Allergies:   Reviewed and edited as appropriate     Allergies   Allergen Reactions     Macrobid [Nitrofurantoin]      Xarelto [Rivaroxaban]             Medications:     Current Facility-Administered Medications   Medication     acetaminophen (TYLENOL) tablet 975 mg     anastrozole (ARIMIDEX) tablet 1 mg     benztropine (COGENTIN) tablet 1-2 mg     calcium carbonate (TUMS) chewable tablet 1,000 mg     cefTRIAXone (ROCEPHIN) 2 g vial to attach to  ml bag for ADULTS or NS 50 ml bag for PEDS     glucose gel 15-30 g    Or     dextrose 50 % injection 25-50 mL    Or     glucagon injection 1 mg     melatonin tablet 1 mg     mirtazapine (REMERON) tablet TABS 7.5 mg     naloxone (NARCAN) injection 0.1-0.4 mg     [START ON 5/18/2019] omeprazole (priLOSEC) CR capsule 40 mg     ondansetron (ZOFRAN-ODT) ODT tab 4 mg    Or     ondansetron (ZOFRAN) injection 4 mg     simvastatin (ZOCOR) tablet 40 mg     warfarin (COUMADIN) tablet 2.5 mg     Warfarin Therapy Reminder (Check START DATE - warfarin may be starting in the FUTURE)             Review of Systems:     A complete review of systems was performed and is negative except as noted in the HPI           Physical Exam:   BP (!) 76/41 (BP Location: Right arm)   Pulse 74   Temp 95  F (35  C)   Resp 16   Ht  1.524 m (5')   Wt 53.3 kg (117 lb 9.6 oz)   SpO2 93%   BMI 22.97 kg/m    Wt:   Wt Readings from Last 2 Encounters:   05/17/19 53.3 kg (117 lb 9.6 oz)      Constitutional: cooperative, pleasant, not dyspneic/diaphoretic, no acute distress  Eyes: Sclera anicteric/injected  Ears/nose/mouth/throat: Normal oropharynx without ulcers or exudate, mucus membranes moist, hearing intact  Neck: supple, thyroid normal size  CV: No edema  Respiratory: Unlabored breathing  Lymph: No axillary, submandibular, supraclavicular or inguinal lymphadenopathy  Abd: soft and nd, nt, + BS no peritoneal signs  Skin: warm, perfused, no jaundice  Neuro: AAO x 3, No asterixis  Psych: Normal affect  MSK: No gross deformities         Data:   Labs reviewed.   Imaging:  No new imaging

## 2019-05-17 NOTE — PLAN OF CARE
PT 5A: Defer - PT orders acknowledged and appreciated. Following chart review and discussion with OT, pt with no acute IP PT needs. Pt mobilizing with cane (baseline) and no LOB with balance challenges during gait. Pt to be followed by OT for fluctuating cognitive status. Please defer to OT for discharge recommendations. Will complete orders. Thank you for your referral.

## 2019-05-17 NOTE — PHARMACY-CONSULT NOTE
Pharmacy Delirium Chart Review    Upon chart review, the following medications may contribute to possible patient delirium:   Mirtazapine: 0.1-1% incidence of delirium  Simvastatin: impaired cognition and altered mental status has been reported  Bactrim: delirium and hallucinations have been reported  Omeprazole: 38-51% incidence of dementia    Please consult unit pharmacist with further questions.    Laura Robbins, [facility]

## 2019-05-17 NOTE — PROGRESS NOTES
19 1048   Quick Adds   Type of Visit Initial Occupational Therapy Evaluation   Living Environment   Lives With alone   Living Arrangements apartment  (senior bldg)   Home Accessibility no concerns   Living Environment Comment Pt lives alone in senior apt; spouse  in March   Self-Care   Usual Activity Tolerance moderate   Current Activity Tolerance fair   Equipment Currently Used at Home cane, straight;grab bar, toilet;grab bar, tub/shower;raised toilet  (has shower chair available)   Activity/Exercise/Self-Care Comment Pt was mod (I) with transfers and mobility using SEC   Functional Level   Ambulation 1-->assistive equipment   Transferring 1-->assistive equipment   Toileting 1-->assistive equipment   Bathing 1-->assistive equipment   Dressing 0-->independent   Eating 0-->independent   Communication 0-->understands/communicates without difficulty   Swallowing 0-->swallows foods/liquids without difficulty   Cognition 0 - no cognition issues reported   Fall history within last six months no   Which of the above functional risks had a recent onset or change? ambulation;transferring;toileting;bathing;cognition   Prior Functional Level Comment Pt was (I) with most ADL/IADLs including medication management, she does have some assist for cleaning, financial management, and driving   General Information   Onset of Illness/Injury or Date of Surgery - Date 19   Referring Physician Bear Lacey MD   Patient/Family Goals Statement to go home   Additional Occupational Profile Info/Pertinent History of Current Problem Pt is a 89 year old with PMH that is pertinent for HTN, bladder cancer, who is presented to  ED with weakness and confusion , she was found to have ADRIANNA and hyperkalemia   Precautions/Limitations no known precautions/limitations   Weight-Bearing Status - LUE full weight-bearing   Weight-Bearing Status - RUE full weight-bearing   Weight-Bearing Status - LLE full weight-bearing  "  Weight-Bearing Status - RLE full weight-bearing   Cognitive Status Examination   Orientation person;place  (Month; stated year as \"19 or something\")   Level of Consciousness alert   Follows Commands (Cognition) follows one step commands   Memory intact   Cognitive Comment Pt reported some issues with STM deficits; generally appropriate with good safety awareness throughout session; will monitor and further assess as indicated   Visual Perception   Visual Perception Wears glasses  (for reading; no new concerns)   Sensory Examination   Sensory Quick Adds No deficits were identified   Pain Assessment   Patient Currently in Pain No   Range of Motion (ROM)   ROM Comment BUEs WFL   Strength   Strength Comments BUEs WFL   Mobility   Bed Mobility Bed mobility skill: Supine to sit;Bed mobility skill: Scooting/Bridging   Bed Mobility Skill: Scooting/Bridging   Level of [place]: Scooting/Bridging independent   Bed Mobility Skill: Supine to Sit   Level of [place]: Supine/Sit independent   Transfer Skill: Sit to Stand   Level of [place]: Sit/Stand stand-by assist   Transfer Skill: Toilet Transfer   Level of [place]: Toilet stand-by assist   Lower Body Dressing   Level of [place]: Dress Lower Body stand-by assist   Physical Assist/Nonphysical Assist: Dress Lower Body verbal cues;1 person assist   Activities of Daily Living Analysis   Impairments Contributing to Impaired Activities of Daily Living cognition impaired;strength decreased   General Therapy Interventions   Planned Therapy Interventions ADL retraining;IADL retraining;bed mobility training;cognition;ROM;strengthening;stretching;transfer training;home program guidelines;progressive activity/exercise   Clinical Impression   Criteria for Skilled Therapeutic Interventions Met yes, treatment indicated   OT Diagnosis decreased ADL/IADL (I)   Influenced by the following impairments weakness, impaired cognition   Assessment of Occupational Performance " "3-5 Performance Deficits   Identified Performance Deficits functional transfers, bathing, dressing, household chores, medication management   Clinical Decision Making (Complexity) Low complexity   Therapy Frequency daily   Predicted Duration of Therapy Intervention (days/wks) 5-7 days   Anticipated Discharge Disposition Home with Assist;Home with Home Therapy   Risks and Benefits of Treatment have been explained. Yes   Patient, Family & other staff in agreement with plan of care Yes   [facility] TM \"6 Clicks\"   2016, Trustees of [facility], under license to [employer].  All rights reserved.   6 Clicks Short Forms Daily Activity Inpatient Short Form   [facility]  \"6 Clicks\" Daily Activity Inpatient Short Form   1. Putting on and taking off regular lower body clothing? 3 - A Little   2. Bathing (including washing, rinsing, drying)? 3 - A Little   3. Toileting, which includes using toilet, bedpan or urinal? 3 - A Little   4. Putting on and taking off regular upper body clothing? 4 - None   5. Taking care of personal grooming such as brushing teeth? 4 - None   6. Eating meals? 4 - None   Daily Activity Raw Score (Score out of 24.Lower scores equate to lower levels of function) 21   Total Evaluation Time   Total Evaluation Time (Minutes) 10     "

## 2019-05-17 NOTE — PLAN OF CARE
Discharge Planner OT   Patient plan for discharge: Home  Current status: OT eval completed and tx initiated. Pt alert and generally oriented though had some difficulty stating specific date/year correctly. Pt able to complete functional transfers, toileting, ADLs at sink, and mobility ~150ft x 2 with SBA using SEC - good safety awareness with activity.  Barriers to return to prior living situation: medical needs, impaired cognition, weakness  Recommendations for discharge: Home with initial supervision for IADLs given recent confusion per chart  Rationale for recommendations: pt moving well and confusion appears to be clearing - will follow up with formal cognitive screen at next session to determine potential assist level for IADLs       Entered by: Darby Escobar 05/17/2019 2:50 PM

## 2019-05-18 NOTE — PLAN OF CARE
/51 (BP Location: Right arm)   Pulse 74   Temp 97.7  F (36.5  C) (Axillary)   Resp 16   Ht 1.524 m (5')   Wt 53.3 kg (117 lb 9.6 oz)   SpO2 97%   BMI 22.97 kg/m      3439-5353: Alert. Disoriented to place, time, & situation or at least forgetful. Reoriented to all 3 when she awoke 45 min after taking HS meds and going to bed. Bed Alarm on. Afebrile. VSS on RA; DBP soft. Denies pain; declined scheduled dose of Tylenol. Up with SBA and cane; ambulated in halls x2. Voids spontaneously. Endorsed burning sensation with void. Miconazole powder applied under breast and in groin folds bilaterally; R breast worse than L. Regular diet on calorie counts; ate 100% of dinner. L PIV SL'd. Plan to treat UTI with IV ABX, Rocephin is once/day, next at noon. Continue to monitor and follow POC.

## 2019-05-18 NOTE — PROGRESS NOTES
Nutrition brief:  Provider consult for Malnutrition:  Patient was assessed yesterday, please refer to the RDnote on 5/17.    Renae Lester RD/YANETH  Pager [phone]

## 2019-05-18 NOTE — PROGRESS NOTES
[facility], [place]    Progress Note - [place] 1 Service        Date of Admission:  5/16/2019    Assessment & Plan   Graciela Hokpins is a 89 year old female with pmh of breast adenocarcinoma, bladder carcinoma c/b urethreal stenosis, htn, recent death of , here with nausea, muscle weakness, and encephalopathy, found to be hyperkalemic with ADRIANNA on CKD, with an ecoli cystitis.      #Hyperkalemia   #ADRIANNA on CKD   Baseline creatinine 1.5 via chart review, elevated to 2.2 on admission with BUN/Cr ratio approximately 20 therefore likely a combination of prerenal and intrinsic disease. Suspect ATN, will trend to creatinine peak.   --BMP  --calorie counts, nutrition consult     #Ecoli UTI  #Hx of bladder cancer c/b urethral stenosis   Hx of bladder carcinoma c/b urethral stenosis. Hx of recurrent UTIs, had been on ppx bactrim, not recently. Pyuria on UA with >100,000 colonies of Ecoli.Remote hx of UC w/ klebsiella    --Ceftriaxone 2g daily -->ciprofloxacin 500mg daily starting 5/19, treat total 7 days (through 5/22)   --Blood culture if febrile   --Bladder scan, evaluate for PVR  --Will need urology follow up for cystoscopy   --ppx antibiotics reinitiated after treatment of this UTI. Bactrim should be avoided given resistance pattern on UC. Consider cephalexin 125mg daily to resume after completion of antibiotics.      #Normocyctic Anemia   #DENITA  #Hx of gastric antral vascular ectasia (GAVE)    Longstanding hx of DENITA with endoscopy from 11/2018 demonstrating GAVE s/p APC with England's esophagus. Hx of upper GI bleed associated with use of rivoroxaban. Needs intermittent pRBC infusions, baseline hemoglobin 8-9. No evidence of current upper or lower gi bleed.   --gastroenterology consult   Will need surveillance outpatient endoscopy   --Continue omeprazole 40mg(previously 20mg)   --Per family request, plans to stay on warfarin (see below)      #Recurrent deep venous thrombosis   #Hx of  Prothrombin gene mutation  Hx of prothrombin gene mutation with recurrent lower extremity thrombosis, lifelong anticoagulation on warfarin. Current LE DVT in 12/2018 when anticoagulation stopped. Warfarin difficult to control due to malnutrition, upper gi bleed on rivaroxaban.   --nutrition consult   --continue warfarin     #Depression  #Adjustment Disorder  --per family request, increase mirtazapine to 15mg (previsuly 7.5)      Chronic Conditions:  #HLD  --Simvastatin 40mg   #HTN  --Hold valsartan - hydrochlorothiazide combination pill   #Hx of breast cancer s/p lumpectomy   --continue anastrozole      Diet: Regular Diet Adult  Snacks/Supplements Adult: Boost Glucose Control; With Meals  Calorie Counts    Fluids: S/p 2.5L IVF, none  Lines: PIV  DVT Prophylaxis: Warfarin  Curtis Catheter: not present  Code Status: Full Code      Disposition Plan   Expected discharge: 2 - 3 days, recommended to prior living arrangement once hemoglobin stable.  Entered: Gabi Benavides MD 05/18/2019, 7:27 AM       The patient's care was discussed with the attending, Dr. Joy.     Gabi Benavides MD  Internal Medicine - Pediatrics PGY3  [place] 1 Service  [facility], [place]  Pager: [phone]  Please see sticky note for cross cover information  ______________________________________________________________________    Interval History   No acute events events overnight. Forgetful and disoriented. Ate well. Good UOP, frequent overnight. Stool. No falls, ambulating with cane in halls     Data reviewed today: I reviewed all medications, new labs and imaging results over the last 24 hours.     Physical Exam   Vital Signs: Temp: 96  F (35.6  C) Temp src: Oral BP: 105/56 Pulse: 103 Heart Rate: 62 Resp: 18 SpO2: 95 % O2 Device: None (Room air)    Weight: 117 lbs 9.6 oz  General: NAD, pleasant and cooperative, non toxic, malnourished appearing   HEENT: NC/AT, well injected Conjunctiva, anicteric sclera, EOMI; PERRL,  MMM;   Neck: Trachea midline; supple, good tone; full ROM; negative for Lymphoadenopathy, JVD  Chest: CTAB B/L ; no rales  CV: RRR; nl S1/S2, 2/6 systolic murmurs radiates to the carotids  Abd: Soft, NT/ND, no HSM, no masses, (+) BS, no suprapubic tenderness    Ext: Warm/well perfused; no Edema.  Skin: Clear; unbroken; no new rashes  Neuro: - MS: AAO x3 - no focal deficit       Data   Recent Labs   Lab 05/17/19  0715 05/17/19  0555 05/17/19  0205 05/16/19  2321  05/16/19 2014   WBC 6.1 Canceled, Test credited 7.8  --   --  10.5   HGB 7.8* Canceled, Test credited 7.4*  --   --  7.8*   * Canceled, Test credited 100  --   --  96   * Canceled, Test credited 148*  --   --  161   INR  --  2.68*  --   --   --  2.55*   NA  --  144 144  --   --  139   POTASSIUM  --  4.7 4.7 5.8*  5.6*   < > 7.0*   CHLORIDE  --  116* 116*  --   --  112*   CO2  --  20 22  --   --  20   BUN  --  41* 44*  --   --  49*   CR  --  2.12* 2.13*  --   --  2.35*   ANIONGAP  --  7 6  --   --  7   FRANCISCA  --  8.5 8.4*  --   --  8.7   GLC  --  89 55*  --   --  87   ALBUMIN  --   --   --   --   --  3.2*   PROTTOTAL  --   --   --   --   --  5.9*   BILITOTAL  --   --   --   --   --  0.8   ALKPHOS  --   --   --   --   --  47   ALT  --   --   --   --   --  17   AST  --   --   --   --   --  14   LIPASE  --   --   --   --   --  273   TROPI  --   --   --   --   --  <0.015    < > = values in this interval not displayed.

## 2019-05-18 NOTE — PLAN OF CARE
IP OT 5A:  Cancelled.  Attempted 3x, pt reporting does not want to get up and move, she does not feel good.  Pt sitting in chair upon 2/3 arrivals.  Pt also reporting not hungry and not wanting to eat.  Will attempt again tomorrow.

## 2019-05-18 NOTE — PLAN OF CARE
0700-1930: VSS - pt has soft pressures- md aware. AOx3- confused on date/year. UP SBA cane. STeady on feet. Pt denies pain/n/v. Pt urinating frequent small amnts. Several loose/watery/green BM's this am. Pt appetite poor. Pt tolerating supper tonight and ate 100%.  Pt on IV Rocephin. 500ml Bolus given. Creatinine 2.12, WBC's 6.1, CRP 62. Hemoglobin 7.8. INR 2.68- 2.5mg Coumadin given. K 4.7.  Pt triggered sepsis protocol- Lactic Acid 1.4. Under each breast- reddened area- Miconazole powder ordered. New PIV saline locked. Pt calls appropriately. Will continue to follow POC/monitor.

## 2019-05-18 NOTE — PROGRESS NOTES
Spoke to primary team about pt's loose water urgent stools- no c-diff order yet. Also, let team no pt declining bladder scan at this time- Pt does not want to go for walk this morning as well.

## 2019-05-18 NOTE — PLAN OF CARE
9410-2729    Pt intermittently confused, relatively pleasant most of the night. Refused many cares from male staff, only able to offer minimal assistance. Refused melatonin. Did not endorse pain or nausea overnight. Slept comfortably throughout the night. Bed alarm was used d/t impulsivity. Continue plan of care.

## 2019-05-19 NOTE — PROGRESS NOTES
Calorie Count  Intake recorded for: 5/18  Total Kcals: 140 Total Protein: 2g  Kcals from Hospital Food: 140   Protein: 2g  Kcals from Outside Food (average):0 Protein: 0g  # Meals Recorded: 100% 1 ice cream  # Supplements Recorded: 0

## 2019-05-19 NOTE — PLAN OF CARE
Discharge Planner OT   Patient plan for discharge: Home with A to daughter's house  Current status: Pt in chair upon OT arrival, pt reporting did eat part of a sweet roll and was sipping on coffee.  Therapeutic use of self-used to interview pt and pt reporting doesn't cook for self in apt since   and hasn't been going to cafeteria since then as she doesn't feel like she can sit with anyone.  Writer expects that pt suffering from some depression since 's death, and thus pt's appetite has been poor.  Completed [facility] cognitive screen pt scoring , 26 is normal.  Difficult to tell if pt with AMS and confusion, or if pt experiencing age appropriate cognitive decline, that would be expected in an unfamiliar setting.  Barriers to return to prior living situation: Confusion, weakness, stairs in daughter's house, decreased appetite  Recommendations for discharge: TCU vs. Home with A.  Some concern as daughter may not always be home with pt during the day and daughter's house has many stairs.  Rationale for recommendations: Pt at increased fall risk due to weakness and confusion, leading to decreased independence in ADLs and IADLs.       Entered by: Blanche Hall 2019 10:49 AM

## 2019-05-19 NOTE — PLAN OF CARE
Time: 2972-4521      Reason for admission: UTI  Vitals: VSS on RA  Activity: Up SBA with cane   Pain: Denies   Neuro:  Alert, oriented to person, place and time during the evening. Became disoriented overnight- oriented to self only. Bed alarm on for safety.   Cardiac: WDL  Respiratory: WDL  GI/: Voiding without difficulty. No BM this shift.   Diet:  Regular diet, poor appetite   Lines: PIV saline locked      New changes this shift: Lab unable to collect 6pm BMP x2, patient refused the third time. Will re-try with morning labs.      Plan: Continue to monitor creatinine and hgb. Discharge back to longterm when medically stable.      Continue to monitor and follow POC

## 2019-05-19 NOTE — PLAN OF CARE
9863-9504: VSS on room air, AOx3 (can't remember what year), Up SBA/CAne. Pt nauseated this am- IV Zofran given and relief throughout the day. Pt on calorie counts- 1 chocolate ice cream today. Family brought in food but pt declined eating/drinking- appetite poor. Pt moved to room by dest for social interaction. Pt sat up in chair throughout day- went for walk in hallway. Pt urinates small amnt of urine. Bladder scanned for 26cc. Creatinine 2.32, Hemoglobin 7.8, WBC 5.9, K 4.5, INR 2.9. Pt calls appropriately. Will continue to follow POC/monitor.  In am- pt had 2 loose/watery/mucusy brown/green bm's.

## 2019-05-19 NOTE — PROGRESS NOTES
[facility], [place]    Progress Note - [place] 1 Service        Date of Admission:  5/16/2019    Assessment & Plan   Graciela Hopkins is a 89 year old female with pmh of breast adenocarcinoma, bladder carcinoma c/b urethreal stenosis, htn, recent death of , here with nausea, muscle weakness, and encephalopathy, found to be hyperkalemic with ADRIANNA on CKD, with an ecoli cystitis.      #Hyperkalemia   #ADRIANNA on CKD   Baseline creatinine 1.5 via chart review, elevated to 2.2 on admission with BUN/Cr ratio approximately 20 therefore likely a combination of prerenal and intrinsic disease. Suspect ATN, will trend to creatinine peak. Creatinine improving, will push PO hydration. Poor PO intake ongoing, will push Boost and supplements, appetite improved 5/19.   --BMP  --calorie counts, nutrition consult      #Ecoli UTI  #Hx of bladder cancer c/b urethral stenosis   Hx of bladder carcinoma c/b urethral stenosis. Hx of recurrent UTIs, had been on ppx bactrim, not recently. Pyuria on UA with >100,000 colonies of Ecoli.Remote hx of UC w/ klebsiella    --Ceftriaxone 2g daily -->ciprofloxacin 500mg daily starting 5/19, treat total 7 days (through 5/22)   --Blood culture if febrile   --Bladder scan, evaluate for PVR  --Will need urology follow up for cystoscopy   --ppx antibiotics reinitiated after treatment of this UTI. Bactrim should be avoided given resistance pattern on UC. Consider cephalexin 125mg daily to resume after completion of antibiotics.      #Normocyctic Anemia   #DENITA  #Hx of gastric antral vascular ectasia (GAVE)    Longstanding hx of DENITA with endoscopy from 11/2018 demonstrating GAVE s/p APC with England's esophagus. Hx of upper GI bleed associated with use of rivoroxaban. Needs intermittent pRBC infusions, baseline hemoglobin 8-9. No evidence of current upper or lower gi bleed.   -- gastroenterology consult, outgoing recs:   -- continue PPI BID 30 min prior to meals until repeat  EGD   -- repeat CBC in one week from discharge with PCP   -- AC clinic/PCP to arrange warfarin dosing (to be held for EGD)   -- outpatient EGD in 4-6 weeks for GAVE assessment (order placed by GI)   -- no outpatient GI f/u required  -- Continue omeprazole 40mg(previously 20mg)   -- Per family request, plans to stay on warfarin (see below)      #Recurrent deep venous thrombosis   #Hx of Prothrombin gene mutation  Hx of prothrombin gene mutation with recurrent lower extremity thrombosis, lifelong anticoagulation on warfarin. Current LE DVT in 12/2018 when anticoagulation stopped. Warfarin difficult to control due to malnutrition, upper gi bleed on rivaroxaban.   -- nutrition consult   -- continue warfarin   -- f/u with previously scheduled AC clinic provider     #Depression  #Adjustment Disorder  --per family request, increase mirtazapine to 15mg (previsuly 7.5)      #Age related cognitive decline  MOCA 18/30, may be worsened in an unfamiliar setting, adjustment d/o, and current infection as above.     #Discharge planning  OT recs TCU vs home with assistance. Daughter provides that she will take Ms. Hopkins home with her at discharge.     Chronic Conditions:  #HLD  --Simvastatin 40mg   #HTN  --Hold valsartan - hydrochlorothiazide combination pill   #Hx of breast cancer s/p lumpectomy   --continue anastrozole      Diet: Regular Diet Adult  Snacks/Supplements Adult: Boost Glucose Control; With Meals  Calorie Counts    Fluids: S/p 2.5L IVF, none  Lines: PIV  DVT Prophylaxis: Warfarin  Curtis Catheter: not present  Code Status: Full Code      Disposition Plan   Expected discharge: 2 - 3 days, recommended to prior living arrangement once hemoglobin stable.  Entered: Deni Mckeon MD 05/19/2019, 6:56 AM       The patient's care was discussed with the attending, Dr. Joy.     Deni Mckeon MD  PGY-1, Internal Medicine  [id number]  [facility] 1  Tone  ______________________________________________________________________    Interval History   No acute events events overnight. Feels much better today, appetite increasing and diarrhea abated. Will work to eat and drink more today. Denies fever, chills, SOB, dysuira, CP, SPENCER. Patient and daughter updated to plan at bedside.     Data reviewed today: I reviewed all medications, new labs and imaging results over the last 24 hours.     Physical Exam   Vital Signs: Temp: 95.8  F (35.4  C) Temp src: Oral BP: 101/48   Heart Rate: 95 Resp: 16 SpO2: 97 % O2 Device: None (Room air)    Weight: 119 lbs 4.8 oz  General: NAD, pleasant and cooperative, non toxic, malnourished appearing   HEENT: NC/AT, well injected Conjunctiva, anicteric sclera, EOMI; PERRL, MMM;   Neck: Trachea midline; supple, good tone; full ROM; negative for Lymphoadenopathy, JVD  Chest: CTAB B/L ; no rales  CV: RRR; nl S1/S2, 2/6 systolic murmurs radiates to the carotids  Abd: Soft, NT/ND, no HSM, no masses, (+) BS, no suprapubic tenderness    Ext: Warm/well perfused; no Edema.  Skin: Clear; unbroken; no new rashes  Neuro: - MS: AAO x3 - no focal deficit       Data   Recent Labs   Lab 05/18/19  0738 05/17/19  0715 05/17/19  0555 05/17/19  0205  05/16/19 2014   WBC 5.9 6.1 Canceled, Test credited 7.8  --  10.5   HGB 7.8* 7.8* Canceled, Test credited 7.4*  --  7.8*    101* Canceled, Test credited 100  --  96   * 148* Canceled, Test credited 148*  --  161   INR 2.90*  --  2.68*  --   --  2.55*     --  144 144  --  139   POTASSIUM 4.5  --  4.7 4.7   < > 7.0*   CHLORIDE 113*  --  116* 116*  --  112*   CO2 21  --  20 22  --  20   BUN 42*  --  41* 44*  --  49*   CR 2.32*  --  2.12* 2.13*  --  2.35*   ANIONGAP 8  --  7 6  --  7   FRANCISCA 8.0*  --  8.5 8.4*  --  8.7   GLC 86  --  89 55*  --  87   ALBUMIN  --   --   --   --   --  3.2*   PROTTOTAL  --   --   --   --   --  5.9*   BILITOTAL  --   --   --   --   --  0.8   ALKPHOS  --   --   --   --    --  47   ALT  --   --   --   --   --  17   AST  --   --   --   --   --  14   LIPASE  --   --   --   --   --  273   TROPI  --   --   --   --   --  <0.015    < > = values in this interval not displayed.

## 2019-05-20 NOTE — PROGRESS NOTES
Calorie Count  Intake recorded for: 5/19  Total Kcals: 1205 Total Protein: 30g  Kcals from Hospital Food: 785  Protein: 22g  Kcals from Outside Food (average):420 Protein: 8g  # Meals Recorded: 2 meals (First - 100% slice of cheese, 2 cups coffee, 6 saltine crackers, 2 ice creams, 50% 1% milk)      (Second - 100% sweet roll from [employer])  # Supplements Recorded: 50% 1 Boost Plus

## 2019-05-20 NOTE — PLAN OF CARE
Pt adequate for discharge today per MD team. Discharge information reviewed with pt's daughter and pt, no questions or concerns. Pts PIV removed. Discharge paperwork signed. Pt and daughter left unit @ 1340 to discharge pharmacy to pick-up meds, then will leave to daughter's house. Pt left unit in stable condition with all belongings.

## 2019-05-20 NOTE — PLAN OF CARE
[id number]: VSS on room air, AOx4, up SBA/Cane. Pt denies pain/n/v. Pt declined tylenol throughout day. Pt tolerated regular diet- Calorie Counts.  Creatinine 2.12, K 4.0, INR 2.40, Hgb 7.8.  3 mg of Coumadin given this evening. Pt hopeful to be discharge home tomorrow.  Pt on oral Cipro.  Call light within reach. Will continue to follow POC/monitor.

## 2019-05-20 NOTE — PLAN OF CARE
Time: 1900-0730     Reason for admission: UTI  Vitals: VSS on RA  Activity: Up SBA with cane   Pain: Denies   Neuro:  A&Ox3  Cardiac: WDL  Respiratory: WDL  GI/: Voiding without difficulty. No BM this shift.   Diet:  Regular diet, poor appetite. Calorie counts  Lines: PIV saline locked      Plan: Continue to monitor creatinine. Possible discharge to daughter's house today.      Continue to monitor and follow POC

## 2019-05-21 NOTE — PLAN OF CARE
Occupational Therapy Discharge Summary    Reason for therapy discharge:    Discharged to [facility].      Progress towards therapy goal(s). See goals on Care Plan in [facility] electronic health record for goal details.  Goals partially met.  Barriers to achieving goals:   discharge from facility.    Therapy recommendation(s):    Continued therapy is recommended.  Rationale/Recommendations:  Pt would benefit from further therapy to increase safety and independence with ADLs/IADLs and functional mobility.

## 2019-05-21 NOTE — DISCHARGE SUMMARY
[facility], [place]  Discharge Summary - Medicine & Pediatrics       Date of Admission:  5/16/2019  Date of Discharge:  5/20/2019  1:41 PM  Discharging Provider: Dr. Joy  Discharge Service: [facility] 1    Discharge Diagnoses   Ecoli urinary tract infection   ADRIANNA on CKD  Gastric Antral Vascular Ectasia   Normocytic Anemia  DENITA  Prothrombin Gene Mutation   Recurrent DVTs  Depression    Follow-ups Needed After Discharge   Follow-up Appointments     Adult [facility] Follow-up and recommended labs and tests      Follow up with primary care provider, Car Radford, within 4 days to   evaluate medication change.  The following labs/tests are recommended:   INR, BMP.      Appointments on [place] and/or [facility] (with [facility] or [facility]   provider or service). Call [phone] if you haven't heard regarding   these appointments within 7 days of discharge.         Adult [facility] Follow-up and recommended labs and tests      Follow up with primary care provider, Car Radford, within 7 days for   hospital follow- up.  The following labs/tests are recommended: BMP, CBC.        Appointments on [place] and/or [facility] (with [facility] or [facility]   provider or service). Call [phone] if you haven't heard regarding   these appointments within 7 days of discharge.             Discharge Disposition   Discharged to home, with daughter  Condition at discharge: Stable    Hospital Course   Graciela Hopkins is a 89 year old female with pmh of breast adenocarcinoma, bladder carcinoma c/b urethreal stenosis, htn, recent death of , here with nausea, muscle weakness, and encephalopathy, found to be hyperkalemic with ADRIANNA on CKD, with an ecoli cystitis.    The following problems were addressed during her hospitalization:    #ADRIANNA on CKD   Baseline creatinine 1.5 via chart review, elevated to 2.2 on admission with BUN/Cr ratio approximately 20 therefore likely a combination of prerenal and  intrinsic disease suspected ATN. Creatinine improved to 1.8 at time of discharge.       #Ecoli UTI  #Hx of bladder cancer c/b urethral stenosis   Hx of bladder carcinoma c/b urethral stenosis. Hx of recurrent UTIs, had been on ppx bactrim, not recently. Pyuria on UA with >100,000 colonies of Ecoli. Started on    Ceftriaxone 2g daily and transitioned to ciprofloxacin 500mg daily starting 5/19 with plans to treat total 7 days through 5/22. She will need outpatient follow up with urology to evaluate for possible cystoscopy. Discussed prophylactic antibiotics and family wishes to continue, will prescribe daily keflex 250mg for prophylactic due to start after completion of ciprofloxacin.       #Normocyctic Anemia   #DENITA  #Hx of gastric antral vascular ectasia (GAVE)    Longstanding hx of DENITA with endoscopy from 11/2018 demonstrating GAVE s/p APC with England's esophagus. Hx of upper GI bleed associated with use of rivoroxaban. Needs intermittent pRBC infusions, baseline hemoglobin 8-9 with hemoglobin 7-8 on admission and stable throughout admission. Gastroenterology consulted on admission whom recommended increasing omeprazole to 40mg daily(from 20mg). She should continue at the higher dose until follow up endoscopy, ordered by GI here in approximately 6-8 weeks. She should have a repeat cbc 1 week after discharge. Family wishes to stay on anticoagulation.      #Recurrent deep venous thrombosis   #Hx of Prothrombin gene mutation  Hx of prothrombin gene mutation with recurrent lower extremity thrombosis, lifelong anticoagulation on warfarin. Current LE DVT in 12/2018 when anticoagulation stopped. Warfarin difficult to control due to malnutrition, with history of upper gi bleed on rivaroxaban. Warfarin continued on admission and discussed alternatives with patient and family including lovenox or apixaban. Family would like to discuss further with PCP. Outpatient monitoring at anticoagulation clinic.      #Depression  #Age  related cognitive decline  MOCA [age], may be worsened in an unfamiliar setting, adjustment d/o, and current infection as above. She was continued on 7.5mg mirtazapine.      #HLD  Continued simvastatin 40mg     #HTN  Normotensive during admission. Held valsartan - hydrochlorothiazide combination pill. Not restarted on discharge and to be determined by PCP at follow up.     #Hx of breast cancer s/p lumpectomy   Continued anastrozole.        Consultations This Hospital Stay   PHYSICAL THERAPY ADULT IP CONSULT  OCCUPATIONAL THERAPY ADULT IP CONSULT  PHARMACY IP CONSULT  PHARMACY TO DOSE WARFARIN  GI LUMINAL ADULT IP CONSULT  VASCULAR ACCESS CARE ADULT IP CONSULT  NUTRITION SERVICES ADULT IP CONSULT  VASCULAR ACCESS CARE ADULT IP CONSULT    Code Status   Full Code       The patient was discussed with Dr. Joy.     Gabi Benavides MD  Internal Medicine - Pediatrics PGY3  [place] [facility] Service  [facility], [place]  Pager: [phone]  ______________________________________________________________________    Physical Exam   Vital Signs: Temp: 97.5  F (36.4  C) Temp src: Oral BP: 104/40   Heart Rate: 87 Resp: 16 SpO2: 98 % O2 Device: None (Room air)    Weight: 119 lbs 4.8 oz  General: NAD, pleasant and cooperative, non toxic, malnourished appearing   HEENT: NC/AT, well injected Conjunctiva, anicteric sclera, EOMI; PERRL, MMM;   Neck: Trachea midline; supple, good tone; full ROM; negative for Lymphoadenopathy, JVD  Chest: CTAB B/L ; no rales  CV: RRR; nl S1/S2, 2/6 systolic murmurs radiates to the carotids  Abd: Soft, NT/ND, no HSM, no masses, (+) BS, no suprapubic tenderness    Ext: Warm/well perfused; no Edema.  Skin: Clear; unbroken; no new rashes  Neuro: - MS: AAO x3 - no focal deficit       Primary Care Physician   Car Radford    Discharge Orders      GASTROENTEROLOGY ADULT REF PROCEDURE ONLY [facility] [phone]      Urology Adult Referral      Reason for your hospital stay     Admitted to the hospital for urinary tract infection and acute kidney injury. Started on ceftriaxone and switched to ciprofloxacin for treatment for 7 days. Will need urology outpatient follow for cystoscopy.     Adult [facility] Follow-up and recommended labs and tests    Follow up with primary care provider, Car Radford, within 4 days to evaluate medication change.  The following labs/tests are recommended: INR, BMP.      Appointments on [place] and/or [facility] (with [facility] or [facility] provider or service). Call [phone] if you haven't heard regarding these appointments within 7 days of discharge.     Activity    Your activity upon discharge: activity as tolerated     Reason for your hospital stay    Admitted to hospital for urinary tract infection secondary to Ecoli. Started on IV antibiotics with improvement and switched to an oral ciprofloxacin regime. Noted to have acute kidney injury on admission with improvement to near baseline on discharge.     Adult [facility] Follow-up and recommended labs and tests    Follow up with primary care provider, Car Radford, within 7 days for hospital follow- up.  The following labs/tests are recommended: BMP, CBC.      Appointments on [facility] and/or [facility] (with [facility] or [facility] provider or service). Call [phone] if you haven't heard regarding these appointments within 7 days of discharge.     Activity    Your activity upon discharge: as tolerated with cane to support walking     Full Code     Diet    Follow this diet upon discharge: Orders Placed This Encounter      Snacks/Supplements Adult: Boost Glucose Control; With Meals      Calorie Counts      Regular Diet Adult     Diet    Follow this diet upon discharge: High protein, high calorie diet. Supplemented with Boost.       Significant Results and Procedures   Most Recent 3 CBC's:  Recent Labs   Lab Test 05/18/19  0738 05/17/19  0715 05/17/19  0555   WBC 5.9 6.1 Canceled, Test credited   HGB 7.8* 7.8*  Canceled, Test credited    101* Canceled, Test credited   * 148* Canceled, Test credited     Most Recent 3 BMP's:  Recent Labs   Lab Test 05/20/19  0556 05/19/19  0700 05/18/19  0738    140 142   POTASSIUM 4.1 4.0 4.5   CHLORIDE 110* 114* 113*   CO2 16* 18* 21   BUN 36* 38* 42*   CR 1.88* 2.12* 2.32*   ANIONGAP 12 8 8   FRANCISCA 7.6* 7.6* 8.0*   GLC 74 64* 86     Most Recent 2 LFT's:  Recent Labs   Lab Test 05/16/19 2014   AST 14   ALT 17   ALKPHOS 47   BILITOTAL 0.8     Most Recent 3 INR's:  Recent Labs   Lab Test 05/20/19  0556 05/19/19  0700 05/18/19  0738   INR 2.69* 2.40* 2.90*   ,   Results for orders placed or performed in visit on 11/09/11   NM Bone 3 Phase With Whole Body    Impression       Examination: Three Phase bone scan (limited area), Nov 9, 2011 1:17:00  PM      Indication: Left bilateral total knee arthroplasties, concern for  stress fracture of distal femur.     Additional Information: None.     Technique:     The patient received 24.5 mCi of Tc-99m MDP intravenously. Three phase  images (flow, blood pool and delayed) were obtained over the knees.        Findings:     The flow phase demonstrates symmetric flow.     The blood pool (soft tissue) phase demonstrates mild increased uptake  along the margins of the left femoral prosthesis and at the patella.     The bound phase (delayed images) demonstrates increased uptake on the  margins of the prosthesis bilaterally, left greater than right and at  the patella. Focal increased uptake at the tip of the left tibial  prosthesis.     Symmetric increased uptake at the shoulders and ankles are likely  degenerative. Mild focal uptake at the upper lumbar spine is  nonspecific, but likely degenerative.     Impression:     1. No evidence for osteomyelitis.  2. On the delayed images there is focal increase uptake at the distal  edge of the left tibial hardware. Consider correlation with x-ray for  evaluation of hardware loosening.     I have  personally reviewed the image and initial interpretation and  agree with the findings.       Discharge Medications   Discharge Medication List as of 5/20/2019 12:37 PM      START taking these medications    Details   cephALEXin (KEFLEX) 250 MG capsule Take 1 capsule (250 mg) by mouth daily, Disp-90 capsule, R-1, E-Prescribe      lactobacillus rhamnosus, GG, (CULTURELL) capsule Take 1 capsule by mouth 2 times daily, Disp-90 capsule, R-1, Local Print      miconazole (MICATIN/MICRO GUARD) 2 % external powder Apply topically 2 times dailyDisp-90 g, [id number]      ondansetron (ZOFRAN-ODT) 4 MG ODT tab Take 1 tablet (4 mg) by mouth every 6 hours as needed for nausea or vomiting, Disp-30 tablet, R-1, E-Prescribe      !! warfarin (COUMADIN) 1 MG tablet Take 1.5 tablets (1.5 mg) by mouth daily, Disp-30 tablet, R-0, E-Prescribe       !! - Potential duplicate medications found. Please discuss with provider.      CONTINUE these medications which have CHANGED    Details   ciprofloxacin (CIPRO) 500 MG tablet Take 1 tablet (500 mg) by mouth every 12 hours, Disp-8 tablet, R-1, E-Prescribe      mirtazapine (REMERON) 7.5 MG tablet Take 1 tablet (7.5 mg) by mouth At Bedtime, Disp-90 tablet, R-1, E-Prescribe      omeprazole (PRILOSEC) 20 MG DR capsule Take 2 capsules (40 mg) by mouth daily, Disp-90 capsule, R-1, E-Prescribe         CONTINUE these medications which have NOT CHANGED    Details   anastrozole (ARIMIDEX) 1 MG tablet Take 1 mg by mouth daily, Historical      simvastatin (ZOCOR) 40 MG tablet Take 40 mg by mouth At Bedtime, Historical      !! warfarin (COUMADIN) 1 MG tablet Take 2.5 mg by mouth daily, Historical       !! - Potential duplicate medications found. Please discuss with provider.      STOP taking these medications       sulfamethoxazole-trimethoprim (BACTRIM DS/SEPTRA DS) 800-160 MG tablet Comments:   Reason for Stopping:         valsartan-hydrochlorothiazide (DIOVAN HCT) 80-12.5 MG tablet Comments:   Reason  for Stopping:             Allergies   Allergies   Allergen Reactions     Macrobid [Nitrofurantoin]      Xarelto [Rivaroxaban]

## 2019-06-23 PROBLEM — I24.9 ACS (ACUTE CORONARY SYNDROME) (H): Status: ACTIVE | Noted: 2019-01-01

## 2019-06-23 NOTE — ED PROVIDER NOTES
[facility] EMERGENCY DEPARTMENT ([facility])    6/23/19       History     Chief Complaint   Patient presents with     Fall     multiple falls recently (2)     The history is provided by the patient, a relative and medical records.     Graciela Hopkins is a 89 year old female with a medical history significant for breast adenocarcinoma, bladder carcinoma complicated by urethral stenosis, hypertension, recurrent DVT (on warfarin), hyperlipidemia and is s/p bilateral knee replacement.  Per review of patient's chart, the patient was recently hospitalized here from 5/16/2019 to 5/20/2019 after presenting to the Emergency Department with nausea, muscle weakness and encephalopathy.  Patient was found to be hyperkalemic with ADRIANNA on CKD and she was found to have E. coli cystitis.  Patient was started on ceftriaxone 2 g daily and transition to ciprofloxacin 500 mg daily starting on 5/19/2019 with plan to complete a total 7-day course through 5/22/2019.  Patient was also started on prophylactic antibiotics for her recurrent UTIs, she was started on Keflex 250 mg daily after finishing her ciprofloxacin.  Patient's creatinine had improved to 1.8 at time of discharge.    Patient presents to the Emergency Department today for evaluation after a mechanical fall that occurred yesterday at approximately 4:30 PM.  The patient's family had left to go to a graduation ceremony at approximately 3 PM and the patient stayed home.  The patient reports that she was in the kitchen reaching for something in the cupboard when the rug slipped out from underneath her and she fell backwards.  The patient struck the back of her head against the  during the fall and she landed on her tailbone.  She denies any loss of consciousness.  The patient was unable to get up on her own and she crawled/slid to the living room, the family returned at approximately 7 PM and found her on the floor.  They helped her up into a chair.  The patient  "was complaining of nausea, but she had not had any episodes of vomiting.  The patient was also complaining of pain in her lower, midline back, but she denied any headache or pain in her extremities.  The family wanted to bring the patient to the Emergency Department that night, but the patient just wanted to go to bed.  They helped the patient to her room.  The patient reports that she continued to have nausea throughout the night and she reports that she was having urinary frequency as well.  However, she reports that when she would urinate she would only urinate a small amount each time.  The patient also reported to family that she felt like her head was \"swimming\".  The patient this morning continued to feel nauseous, so the family brought the patient to the Emergency Department to be evaluated.    The patient here is complaining of nausea as well as pain in her lower, mid back/tailbone area.  She denies any headache, numbness, weakness, vision changes, dizziness or neck pain.  She denies any chest pain, shortness of breath, lightheadedness or dizziness prior to the fall yesterday.  The patient denies any episodes of vomiting.  She denies any recent abdominal pain, diarrhea or blood in her stool.  She denies any dysuria, she states that she only had urinary frequency last night.  The family does note that the patient has not been eating or drinking a lot of fluids recently.  The family does note that the patient had another fall approximately 2 weeks ago where she landed on her tailbone as well.  The family reports that the patient has been having increasing difficulties with her balance as well as weakness over the last few months.  Patient uses a cane at baseline.    Per review of patient's chart, the patient did recently see her PCP 6/7/19 and she had a CBC and BMP done at that time.  Patient's BMP returned with an elevated potassium at 6.3; it is documented that they attempted to contact the patient and " family, but was unable to get a hold of anyone.  Patient's hemoglobin at that time was 8.8.  Patient's INR was last checked on 6/20/2019, it was 2.2 at that time.    I have reviewed the Medications, Allergies, Past Medical and Surgical History, and Social History in the [facility] system.    Past Medical History:   Diagnosis Date     Arthritis      Cancer (H)     bladder     Chronic kidney disease     renal insufficiency     Gastric antral vascular ectasia      Hypertension      Urinary tract infection        Past Surgical History:   Procedure Laterality Date     BREAST SURGERY      lumpectomy     ORTHOPEDIC SURGERY      double total knee       History reviewed. No pertinent family history.    Social History     Tobacco Use     Smoking status: Never Smoker     Smokeless tobacco: Never Used   Substance Use Topics     Alcohol use: Never     Frequency: Never       Current Facility-Administered Medications   Medication     0.9% sodium chloride BOLUS     magnesium sulfate 1 gm in 100mL D5W intermittent infusion     sodium chloride 0.9% infusion     Current Outpatient Medications   Medication     anastrozole (ARIMIDEX) 1 MG tablet     denosumab (PROLIA) 60 MG/ML SOSY injection     mirtazapine (REMERON) 15 MG tablet     omeprazole (PRILOSEC) 20 MG DR capsule     Vitamin D, Cholecalciferol, 1000 units TABS     warfarin (COUMADIN) 2.5 MG tablet     simvastatin (ZOCOR) 40 MG tablet        Allergies   Allergen Reactions     Macrobid [Nitrofurantoin]      Xarelto [Rivaroxaban]          Review of Systems   Constitutional: Negative for fever.   HENT: Negative for congestion, rhinorrhea and sore throat.    Eyes: Negative for visual disturbance.   Respiratory: Negative for cough and shortness of breath.    Cardiovascular: Negative for chest pain.   Gastrointestinal: Positive for nausea. Negative for abdominal pain, blood in stool, diarrhea and vomiting.   Genitourinary: Positive for frequency. Negative for dysuria.   Musculoskeletal:  Positive for back pain (lower mid) and gait problem (increase balance issues over last few months). Negative for neck pain.   Skin: Negative for rash.   Neurological: Negative for dizziness, syncope, weakness, light-headedness, numbness and headaches.   All other systems reviewed and are negative.      Physical Exam   BP: 112/78  Pulse: 98  Temp: 98.3  F (36.8  C)  Resp: 20  Weight: 53.8 kg (118 lb 11.2 oz)  SpO2: 100 %      Physical Exam   GEN:  Well developed, no acute distress  HEENT:  PERRL, EOMI, Mucous membranes are moist.   Cardio:  RRR, systolic murmur present, not new per patient, radial and dorsalis pedis pulses equal bilaterally  PULM:  Lungs clear, good air movement, no wheezes, rales  Abd:  Soft, normal bowel sounds, no focal tenderness  Musculoskeletal: No C-spine, T-spine, L-spine tenderness, there is tenderness over the lower sacrum/coccyx area, extremities have normal range of motion, no lower extremity swelling or calf tenderness  Neuro:  Alert and oriented X3, Follows commands, CN exam is normal, finger to nose is normal, sensation and strength is normal throughout, patellar reflexes are normal, no pronator drift   Skin:  Warm, dry          ED Course   9:52 AM  The patient was seen and examined by Sharda Berg MD in Room ED07.        Procedures             EKG Interpretation:      Interpreted by Sharda Blackman reviewed: 10:20  Symptoms at time of EKG: fall   Rhythm: normal sinus   Rate: normal  Axis: normal  Ectopy: none  Conduction: normal  ST Segments/ T Waves: T wave inversions in 1, aVL, V5 and V6, new compared to EKG from May 17, 2019.  Q Waves: V1  Comparison to prior: New T wave inversions in lateral leads    Clinical Impression: Sinus rhythm with new T wave inversions in lateral leads         EKG Interpretation:      Interpreted by Sharda Blackman reviewed:13:05   Symptoms at time of EKG: fall, elevated troponin   Rhythm: normal sinus   Rate: normal  Axis:  NORMAL  Ectopy: none  Conduction: normal  ST Segments/ T Waves: T wave inversions in leads I, aVL, V5 and V6  Q Waves: none  Comparison to prior: Unchanged from earlier today    Clinical Impression: Sinus rhythm with new T wave inversions in the lateral leads, concerning for lateral ischemia      Critical Care time:  60    Labs are normal except as shown.  Patient's hemoglobin is close to previous values, however given the elevated troponin with T wave inversions in the lateral leads on her EKG, I think transfusion is warranted.  Type and cross was sent in the order was placed for blood transfusion.    Patient was given IV calcium chloride for her hyperkalemia.  There are no EKG changes indicating hyperkalemia, so she was treated for the hyperkalemia with IV fluids and was not given insulin and dextrose for now.  Patient's daughter is a physician and explained the during her previous admission, the patient's elevated potassium corrected with IV fluids only.  Patient's creatinine is close to previous values.  Patient's magnesium is low today as well, she was ordered to have IV magnesium replacement.    Patient had chest x-ray, CT of the head and CT of the lumbar spine.  Chest x-ray was reviewed by me.  Results of the imaging studies are shown here:    Results for orders placed or performed during the hospital encounter of 06/23/19   XR Chest 2 Views    Narrative    CHEST TWO VIEWS   6/23/2019 12:01 PM    HISTORY: Pain after falling.    COMPARISON: 3/2/2011      Impression    IMPRESSION: No definite fracture or other chest wall pathology is  seen. There are degenerative changes in the spine. The heart size is  normal. There is mild atelectasis or fibrosis of the left lung base  which is similar to the previous study. Right lung is clear. No other  abnormality is demonstrated.     BRANDIN MCALLISTER MD   CT Head w/o Contrast    Narrative    CT OF THE HEAD AND LUMBAR SPINE WITHOUT CONTRAST  6/23/2019 11:54 AM      COMPARISON: None    HISTORY: Traumatic head and lumbar spine injuries.    TECHNIQUE:   HEAD CT: Axial CT images of the head from the skull base to the vertex  were acquired without IV contrast.  LUMBAR SPINE CT: Axial images of the lumbar spine were acquired  without intravenous contrast. Multiplanar reformations were created.      FINDINGS:   HEAD CT:  INTRACRANIAL CONTENTS: No mass, hemorrhage or stroke. Mild presumed  chronic small vessel ischemic change and generalized volume loss.    VISUALIZED ORBITS/SINUSES/MASTOIDS: No significant orbital  abnormality. No significant paranasal sinus mucosal disease. No  significant middle ear or mastoid effusion.    OSSEOUS STRUCTURES/SOFT TISSUES: No significant abnormality.    LUMBAR SPINE CT:  VERTEBRA: Normal vertebral body heights. Slightly straightened  lordosis. Mild rightward curvature. No fracture or posttraumatic  subluxation.    CANAL/FORAMINA: Multilevel degenerative change. Associated  mild/moderate left L3-L4, right L4-L5 and bilateral L5-S1 neural  foraminal stenoses. No high-grade spinal canal stenosis.    EXTRASPINAL: Sludge in the gallbladder. No acute abnormality.      Impression    IMPRESSION:  HEAD CT:  1.  Normal for age.    LUMBAR SPINE CT:  1.  No CT evidence for acute fracture or post traumatic subluxation.  2.  Degenerative change results in mild/moderate left L3-L4, right  L4-L5 and bilateral L5-S1 neural foraminal stenoses.    Radiation dose for this scan was reduced using automated exposure  control, adjustment of the mA and/or kV according to patient size, or  iterative reconstruction technique    ZAID SHETH MD   CT Lumbar Spine w/o Contrast    Narrative    CT OF THE HEAD AND LUMBAR SPINE WITHOUT CONTRAST  6/23/2019 11:54 AM     COMPARISON: None    HISTORY: Traumatic head and lumbar spine injuries.    TECHNIQUE:   HEAD CT: Axial CT images of the head from the skull base to the vertex  were acquired without IV contrast.  LUMBAR SPINE CT:  "Axial images of the lumbar spine were acquired  without intravenous contrast. Multiplanar reformations were created.      FINDINGS:   HEAD CT:  INTRACRANIAL CONTENTS: No mass, hemorrhage or stroke. Mild presumed  chronic small vessel ischemic change and generalized volume loss.    VISUALIZED ORBITS/SINUSES/MASTOIDS: No significant orbital  abnormality. No significant paranasal sinus mucosal disease. No  significant middle ear or mastoid effusion.    OSSEOUS STRUCTURES/SOFT TISSUES: No significant abnormality.    LUMBAR SPINE CT:  VERTEBRA: Normal vertebral body heights. Slightly straightened  lordosis. Mild rightward curvature. No fracture or posttraumatic  subluxation.    CANAL/FORAMINA: Multilevel degenerative change. Associated  mild/moderate left L3-L4, right L4-L5 and bilateral L5-S1 neural  foraminal stenoses. No high-grade spinal canal stenosis.    EXTRASPINAL: Sludge in the gallbladder. No acute abnormality.      Impression    IMPRESSION:  HEAD CT:  1.  Normal for age.    LUMBAR SPINE CT:  1.  No CT evidence for acute fracture or post traumatic subluxation.  2.  Degenerative change results in mild/moderate left L3-L4, right  L4-L5 and bilateral L5-S1 neural foraminal stenoses.    Radiation dose for this scan was reduced using automated exposure  control, adjustment of the mA and/or kV according to patient size, or  iterative reconstruction technique    ZAID SHETH MD     Patient was discussed with the cardiology attending, Dr. Ayon.  Given the EKG changes and elevated troponin in the setting of a fall in a patient who \"just does not feel well\" and has nausea, anticipate the patient will need an angiogram.  Dr. Ayon agreed with this as well.  Patient is already anticoagulated with warfarin, so was not started on a heparin drip.    Labs Ordered and Resulted from Time of ED Arrival Up to the Time of Departure from the ED   CBC WITH PLATELETS DIFFERENTIAL - Abnormal; Notable for the following " components:       Result Value    RBC Count 2.63 (*)     Hemoglobin 8.0 (*)     Hematocrit 25.6 (*)     MCHC 31.3 (*)     All other components within normal limits   COMPREHENSIVE METABOLIC PANEL - Abnormal; Notable for the following components:    Potassium 6.3 (*)     Chloride 113 (*)     Urea Nitrogen 43 (*)     Creatinine 1.80 (*)     GFR Estimate 24 (*)     GFR Estimate If Black 27 (*)     Calcium 8.4 (*)     Albumin 3.0 (*)     Protein Total 5.8 (*)     All other components within normal limits   TROPONIN I - Abnormal; Notable for the following components:    Troponin I ES 1.114 (*)     All other components within normal limits   INR - Abnormal; Notable for the following components:    INR 1.90 (*)     All other components within normal limits   MAGNESIUM - Abnormal; Notable for the following components:    Magnesium 1.0 (*)     All other components within normal limits   POTASSIUM - Abnormal; Notable for the following components:    Potassium 6.0 (*)     All other components within normal limits   ISTAT GASES ELEC ICA GLUC DANY POCT - Abnormal; Notable for the following components:    PCO2 Venous 39 (*)     Potassium 6.0 (*)     Hemoglobin 7.5 (*)     Hematocrit - POCT 22 (*)     All other components within normal limits   CK TOTAL   PHOSPHORUS   CK TOTAL   ROUTINE UA WITH MICROSCOPIC REFLEX TO CULTURE   POTASSIUM   ISTAT GAS OR ELECTROLYTE NURSING POCT   CARDIAC CONTINUOUS MONITORING   ASSESS FOR HYPOGLYCEMIA SYMPTOMS   ABO/RH TYPE AND SCREEN   RED BLOOD CELL PREPARE ORDER UNIT   BLOOD COMPONENT   BLOOD COMPONENT            Assessments & Plan (with Medical Decision Making)   Patient presents after a fall yesterday with nausea and not feeling well.  She was evaluated for injuries with head CT, lumbar spine CT, chest x-ray.  There are no fractures, no intracranial hemorrhage and she has no neurologic deficits.  Patient, however, has hyperkalemia and an elevated troponin with T wave changes in the lateral leads  on her EKG.  She will need to have her potassium corrected, would benefit I think from blood transfusion given her low hemoglobin with elevated troponin and will likely have an angiogram.  Patient will be admitted to the cardiology service for close monitoring and further care.    I have reviewed the nursing notes.    I have reviewed the findings, diagnosis, plan and need for follow up with the patient.       Medication List      There are no discharge medications for this visit.         Final diagnoses:   Fall, initial encounter   Troponin level elevated   Hyperkalemia   Chronic kidney disease, unspecified CKD stage     I, Binu Segal, am serving as a trained medical scribe to document services personally performed by Sharda Berg MD, based on the provider's statements to me.   I, Sharda Berg MD, was physically present and have reviewed and verified the accuracy of this note documented by Binu Segal.    6/23/2019   [facility], [place], EMERGENCY DEPARTMENT     Sharda Berg MD  06/23/19 [id number]

## 2019-06-23 NOTE — H&P
Cardiology History and Physical    Patient Name: Graciela Hopkins MRN# [id number]   Age: 89 year old YOB: 1925     Date of Admission:6/23/2019  Primary care provider: Car Radford  Date of Service: 6/23/2019         Assessment and Plan:   89 yo female with a h/o breast cancer in remission, bladder cancer, CKD III, PAD, HTN, and GAVE who presented after a mechanical fall at home with persistent nausea, found to have an elevated troponin of 1.114 and TWI concerning for NSTEMI.    # Elevated troponin, concern for NSTEMI vs demand ischemia  No active chest pain, incidentally found to have TWI in V1-6, troponin 1.116 after mechanical fall at home. CAD risk factors include CKD III, PAD, and HTN. No prior stress tests or angiograms in our records. Will assess for any WMA before invasive diagnostic testing.  - trend troponin to peak  - start ASA 81 mg, atorvastatin 40 mg daily   - TTE ordered  - will likely need stress testing as outpatient if troponin peaks  - cardiac diet    # Mechanical fall  # Generalized weakness  No fractures or abnormalities on head CT. Suspect dehydration and deconditioning contributing to fall as well.  - orthostatics  - fall precautions  - tylenol PRN  - PT/OT consult    # ADRIANNA on CKD III  Baseline Cr 1.5, up to 1.8 on admission. UA showed spec grav 1.011, WBC 17. BUN:Cr > 20 suggest prerenal issue, possible dehydration.  - mIVF NS 75 ml/hr  - encourage PO intake  - trend BMP  - avoid nephrotoxins    # Hyperkalemia  In setting of ADRIANNA and dehydration  - s/p Ca gluconate 1 g in ED  - D50 amp and insulin x 1 stat  - trend K Q4H    Chronic issues:  # Recurrent b/l DVT: continue PTA wafarin  # PAD s/p femoral artery stent: was off simvastatin, will start atorvastatin instead  # GAVE, chornic normocytic anemia: Hgb at baseline 7-8, no signs of bleeding, no h/o liver disease  # Breast cancer: in remission, continue PTA anastrazole  # GERD: continue PTA PPI  # Depression: continue PTA  mirtazapine    FEN: Full  Ppx: PTA warfarin  Dispo: Admit to Cards 1  CODE: Full    Patient was staffed with Dr. Ayon who agrees with the assessment and plan.    Santana Ramirez MD  Internal Medicine PGY-2  Pager [phone]         Chief Complaint:   Nausea, mechanical fall         HPI:   89 yo female with a h/o breast cancer in remission, bladder cancer, CKD III, PAD, HTN, and GAVE who presented after a mechanical fall at home with persistent nausea, found to have an elevated troponin of 1.114 and TWI concerning for NSTEMI.    Patient is seen at bedside with 2 daughters, 1 of whom is an ED provider here at the [facility].  Dee Dee (ED provider daughter) states that her mother was recently admitted for a UTI for which she was treated, and has been recovering from that infection.  She notes that her mother has been still feeling weak from the infection, but was active around the house.  Patient states that yesterday she was reaching for something in her kitchen cabinets and fell on the rug backwards landed on her bottom and hit her head on the kitchen sink.  She does not recall losing consciousness, but she notes that she was able to drag herself over to softer ground on the carpet.  She was eventually found by her daughter approximately 3-1/2 hours later, patient then noted that she was feeling nauseous but comfortable enough to go to bed.  She then woke up in the morning still feeling nauseous and was encouraged by her daughter to go to the emergency room to be evaluated.  During this entire time she did not have any chest pain.  She denies any prior heart disease, daughter states that she had a femoral artery stent many years ago and has had recurrent DVTs for which she is on chronic warfarin.    In the ED her vitals showed heart rate of 98 blood pressure 112/78, satting well on room air.  Labs revealed a potassium 6.0, creatinine 1.8 (baseline 1.5), BUN of 43, hemoglobin 7.5, platelets 23, INR  1.9, troponin of 1.117.  EKG revealed T wave inversions in V1 through V6.  Chest x-ray showed no acute pathology, head CT was normal, lumbar CT was negative for fracture.  She was given a normal saline bolus, 1 g of calcium gluconate, and magnesium 1 g. She was admitted to [facility] given elevated troponin and EKG changes concerning for NSTEMI.         Past Medical History:     Past Medical History:   Diagnosis Date     Arthritis      Cancer (H)     bladder     Chronic kidney disease     renal insufficiency     Gastric antral vascular ectasia      Hypertension      Urinary tract infection             Past Surgical History:     Past Surgical History:   Procedure Laterality Date     BREAST SURGERY      lumpectomy     ORTHOPEDIC SURGERY      double total knee            Social History:     Social History     Socioeconomic History     Marital status:      Spouse name: Not on file     Number of children: Not on file     Years of education: Not on file     Highest education level: Not on file   Occupational History     Not on file   Social Needs     Financial resource strain: Not on file     Food insecurity:     Worry: Not on file     Inability: Not on file     Transportation needs:     Medical: Not on file     Non-medical: Not on file   Tobacco Use     Smoking status: Never Smoker     Smokeless tobacco: Never Used   Substance and Sexual Activity     Alcohol use: Never     Frequency: Never     Drug use: Never     Sexual activity: Not Currently   Lifestyle     Physical activity:     Days per week: Not on file     Minutes per session: Not on file     Stress: Not on file   Relationships     Social connections:     Talks on phone: Not on file     Gets together: Not on file     Attends Yarsanism service: Not on file     Active member of club or organization: Not on file     Attends meetings of clubs or organizations: Not on file     Relationship status: Not on file     Intimate partner violence:     Fear of current or ex  partner: Not on file     Emotionally abused: Not on file     Physically abused: Not on file     Forced sexual activity: Not on file   Other Topics Concern     Not on file   Social History Narrative     Not on file            Family History:   History reviewed. No pertinent family history.         Immunizations:     There is no immunization history on file for this patient.           Allergies:      Allergies   Allergen Reactions     Macrobid [Nitrofurantoin]      Xarelto [Rivaroxaban]             Medications:     Prior to Admission Medications   Prescriptions Last Dose Informant Patient Reported? Taking?   Vitamin D, Cholecalciferol, 1000 units TABS 6/22/2019  Yes Yes   Sig: Take 1 tablet by mouth daily   anastrozole (ARIMIDEX) 1 MG tablet 6/22/2019  Yes Yes   Sig: Take 1 mg by mouth daily   denosumab (PROLIA) 60 MG/ML SOSY injection 1/22/2019  Yes Yes   Sig: Inject 60 mg Subcutaneous every 6 months   mirtazapine (REMERON) 15 MG tablet Past Week  Yes Yes   Sig: Take 15 mg by mouth At Bedtime   omeprazole (PRILOSEC) 20 MG DR capsule 6/22/2019  No Yes   Sig: Take 2 capsules (40 mg) by mouth daily   simvastatin (ZOCOR) 40 MG tablet Past Month  Yes No   Sig: Take 40 mg by mouth At Bedtime   warfarin (COUMADIN) 2.5 MG tablet 6/22/2019  Yes Yes   Sig: Take 3.75 mg by mouth on Monday, Thursday, and Saturday, and take 2.5 mg by mouth on all other days.      Facility-Administered Medications: None             Review of Systems:   A complete, 10 point ROS was performed and is negative other than what is stated in the HPI.         Physical Exam:   Blood pressure 112/78, pulse 98, temperature 98.3  F (36.8  C), temperature source Oral, resp. rate 20, weight 53.8 kg (118 lb 11.2 oz), SpO2 100 %.    General:  Alert, lying in bed, no acute distress.  HEENT: Atraumatic, anicteric sclera, extraocular motion intact, nares dry, mucus membranes moist.  CV: Regular rate and rhythm. S1, S2+.  No murmurs, rubs or gallops. JVD not  visualized.  Resp: Clear to auscultation bilaterally, no accessory muscle use.  Abdomen: Bowel sounds +, nondistended, soft, nontender, no hepatosplenomegaly, no masses.  Extremities: No peripheral edema, warm and well perfused.  Skin: No rashes, bruising, or jaundice.  Neuro: Alert, oriented x 3, symmetric facial expressions, moving extremities equally  Psych: Appropriate affect.         Data:     Lab Results   Component Value Date    WBC 5.1 06/23/2019    HGB 7.5 (L) 06/23/2019    HCT 25.6 (L) 06/23/2019     06/23/2019     06/23/2019    POTASSIUM 6.0 (H) 06/23/2019    CHLORIDE 113 (H) 06/23/2019    CO2 21 06/23/2019    BUN 43 (H) 06/23/2019    CR 1.80 (H) 06/23/2019    GLC 89 06/23/2019    SED 53 (H) 05/17/2019    TROPI 1.114 (HH) 06/23/2019    AST 31 06/23/2019    ALT 18 06/23/2019    ALKPHOS 52 06/23/2019    BILITOTAL 0.5 06/23/2019    INR 1.90 (H) 06/23/2019     Chest x-ray (6/23):  IMPRESSION: No definite fracture or other chest wall pathology is  seen. There are degenerative changes in the spine. The heart size is  normal. There is mild atelectasis or fibrosis of the left lung base  which is similar to the previous study. Right lung is clear. No other  abnormality is demonstrated.     HEAD CT (6/23):  1.  Normal for age.     LUMBAR SPINE CT (6/23):  1.  No CT evidence for acute fracture or post traumatic subluxation.  2.  Degenerative change results in mild/moderate left L3-L4, right  L4-L5 and bilateral L5-S1 neural foraminal stenoses.

## 2019-06-23 NOTE — PHARMACY-ADMISSION MEDICATION HISTORY
Admission medication history for the June 23, 2019 admission is complete.     Interview sources:  Patient, family, chart review (discharge summary 05/20/2019)    Reliability of source: Good - The patient and her family were reliable historians.    Medication compliance: Good    Changes made to PTA medication list (reason)  Added:  -Vitamin D3    Deleted:  -Cephalexin 250 mg capsule: Take 1 capsule by mouth daily (patient has prescription but has never used)  -Ciprofloxacin 500 mg tablet: Take 1 tablet by mouth every 12 hours (completed)  -Lactobacillus rhamnosus (Culturell) capsule: Take 1 capsule by mouth 2 times daily (patient not taking)  -Miconazole 2% external powder: Apply topically 2 times daily (patient not using)  -Ondansetron 4 mg ODT tablet: Take 1 tablet by mouth every 6 hours as needed for nausea (has prescription but has never used)    Changed:   Warfarin: Take 2.5 mg by mouth daily >>> Take 3.75 mg by mouth Monday, Thursday, and Saturday, and take 2.5 mg by mouth on all other days (current dose per patient's family)    Additional medication history information:   -The patient denies any additional prescription or over-the-counter medications.  -The simvastatin prescription was held on 06/07/2019 to be reevaluated after 3 months.  -She is also receiving denosumab subcutaneous injections (Inject 60 mg subcutaneously every 6 months), last administered on 01/22/2019 at 1048.    Prior to Admission Medication List:  Prior to Admission medications    Medication Sig Last Dose Taking? Auth Provider   anastrozole (ARIMIDEX) 1 MG tablet Take 1 mg by mouth daily 6/22/2019 Yes Reported, Patient   denosumab (PROLIA) 60 MG/ML SOSY injection Inject 60 mg Subcutaneous every 6 months 1/22/2019 Yes Unknown, Entered By History   mirtazapine (REMERON) 15 MG tablet Take 15 mg by mouth At Bedtime Past Week Yes Unknown, Entered By History   omeprazole (PRILOSEC) 20 MG DR capsule Take 2 capsules (40 mg) by mouth daily  6/22/2019 Yes Gabi Benavides MD   Vitamin D, Cholecalciferol, 1000 units TABS Take 1 tablet by mouth daily 6/22/2019 Yes Unknown, Entered By History   warfarin (COUMADIN) 2.5 MG tablet Take 3.75 mg by mouth on Monday, Thursday, and Saturday, and take 2.5 mg by mouth on all other days. 6/22/2019 Yes Unknown, Entered By History   simvastatin (ZOCOR) 40 MG tablet Take 40 mg by mouth At Bedtime Past Month   Reported, Patient       Time spent: 40 minutes    Medication history completed by: Haydee Arguello, Pharmacy Intern    I have reviewed the medication list with the student, and it is accurate and up to date to the best of my knowledge.  José Manuel Pickens, PharmD, BCPP

## 2019-06-23 NOTE — PHARMACY-PHARMACOTHERAPY NOTE
Patient is being treated for hyperkalemia. A pharmacy consult was initiated to review the patient's medication list for possible causes of hyperkalemia.    No medications on this patient's profile are likely to have the side effect of hyperkalemia.     Continue current medication regimen.

## 2019-06-23 NOTE — PHARMACY-ANTICOAGULATION SERVICE
Clinical Pharmacy - Warfarin Dosing Consult     Pharmacy has been consulted to manage this patient s warfarin therapy.  Indication: DVT/PE Prophylaxis  Therapy Goal: INR 2-3  Provider/Team: Marcy Samson  OP Anticoag Clinic: [facility]  Warfarin Prior to Admission: Yes  Warfarin PTA Regimen: 3.75 mg on MO, TH, SA and 2.5 mg rest of week    INR   Date Value Ref Range Status   06/23/2019 1.90 (H) 0.86 - 1.14 Final   05/20/2019 2.69 (H) 0.86 - 1.14 Final       Recommend warfarin 2.5 mg today.  Pharmacy will monitor Graciela Hopkins daily and order warfarin doses to achieve specified goal.      Please contact pharmacy as soon as possible if the warfarin needs to be held for a procedure or if the warfarin goals change.

## 2019-06-24 NOTE — DISCHARGE SUMMARY
Cardiology Discharge Summary  Graciela Hopkins MRN: [id number]  8/14/1925  Primary care provider: Car Radford  ___________________________________          Date of Admission:  6/23/2019  Date of Discharge:  6/24/2019   Admitting Physician:  Ricco Ayon MD  Discharge Physician:  Jitendra Levine MD  Discharging Service:  Cardiology 1     Primary Provider: Car Radford         For PCP/Outpatient Follow-up:   None required  Standing orders for potassium check         Reason for Admission:   Elevated troponin, mechanical fall          Discharge Diagnoses:   Elevated troponin, likely demand ischemia  Recent mechanical fall  Deconditioning  Nausea, improved  Hyperkalemia, improved  ADRIANNA on CKD III, improved  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    COMPARISON: 3/2/2011      Impression    IMPRESSION: No definite fracture or other chest wall pathology is  seen. There are degenerative changes in the spine. The heart size is  normal. There is mild atelectasis or fibrosis of the left lung base  which is similar to the previous study. Right lung is clear. No other  abnormality is demonstrated.     BRANDIN MCALLISTER MD   CT Head w/o Contrast    Narrative    CT OF THE HEAD AND LUMBAR SPINE WITHOUT CONTRAST  6/23/2019 11:54 AM     COMPARISON: None    HISTORY: Traumatic head and lumbar spine injuries.    TECHNIQUE:   HEAD CT: Axial CT images of the head from the skull base to the vertex  were acquired without IV contrast.  LUMBAR SPINE CT: Axial images of the lumbar spine were acquired  without intravenous contrast. Multiplanar reformations were created.       FINDINGS:   HEAD CT:  INTRACRANIAL CONTENTS: No mass, hemorrhage or stroke. Mild presumed  chronic small vessel ischemic change and generalized volume loss.    VISUALIZED ORBITS/SINUSES/MASTOIDS: No significant orbital  abnormality. No significant paranasal sinus mucosal disease. No  significant middle ear or mastoid effusion.    OSSEOUS STRUCTURES/SOFT TISSUES: No significant abnormality.    LUMBAR SPINE CT:  VERTEBRA: Normal vertebral body heights. Slightly straightened  lordosis. Mild rightward curvature. No fracture or posttraumatic  subluxation.    CANAL/FORAMINA: Multilevel degenerative change. Associated  mild/moderate left L3-L4, right L4-L5 and bilateral L5-S1 neural  foraminal stenoses. No high-grade spinal canal stenosis.    EXTRASPINAL: Sludge in the gallbladder. No acute abnormality.      Impression    IMPRESSION:  HEAD CT:  1.  Normal for age.    LUMBAR SPINE CT:  1.  No CT evidence for acute fracture or post traumatic subluxation.  2.  Degenerative change results in mild/moderate left L3-L4, right  L4-L5 and bilateral L5-S1 neural foraminal stenoses.    Radiation dose for this scan was reduced using automated exposure  control, adjustment of the mA and/or kV according to patient size, or  iterative reconstruction technique    ZAID SHETH MD   CT Lumbar Spine w/o Contrast    Narrative    CT OF THE HEAD AND LUMBAR SPINE WITHOUT CONTRAST  6/23/2019 11:54 AM     COMPARISON: None    HISTORY: Traumatic head and lumbar spine injuries.    TECHNIQUE:   HEAD CT: Axial CT images of the head from the skull base to the vertex  were acquired without IV contrast.  LUMBAR SPINE CT: Axial images of the lumbar spine were acquired  without intravenous contrast. Multiplanar reformations were created.      FINDINGS:   HEAD CT:  INTRACRANIAL CONTENTS: No mass, hemorrhage or stroke. Mild presumed  chronic small vessel ischemic change and generalized volume loss.    VISUALIZED ORBITS/SINUSES/MASTOIDS: No significant  orbital  abnormality. No significant paranasal sinus mucosal disease. No  significant middle ear or mastoid effusion.    OSSEOUS STRUCTURES/SOFT TISSUES: No significant abnormality.    LUMBAR SPINE CT:  VERTEBRA: Normal vertebral body heights. Slightly straightened  lordosis. Mild rightward curvature. No fracture or posttraumatic  subluxation.    CANAL/FORAMINA: Multilevel degenerative change. Associated  mild/moderate left L3-L4, right L4-L5 and bilateral L5-S1 neural  foraminal stenoses. No high-grade spinal canal stenosis.    EXTRASPINAL: Sludge in the gallbladder. No acute abnormality.      Impression    IMPRESSION:  HEAD CT:  1.  Normal for age.    LUMBAR SPINE CT:  1.  No CT evidence for acute fracture or post traumatic subluxation.  2.  Degenerative change results in mild/moderate left L3-L4, right  L4-L5 and bilateral L5-S1 neural foraminal stenoses.    Radiation dose for this scan was reduced using automated exposure  control, adjustment of the mA and/or kV according to patient size, or  iterative reconstruction technique    ZAID SHETH MD            Consultations:   Nutrition  Occupational therapy         History of Present Illness:    Graciela Hopkins is a 89 yo female with a h/o breast cancer in remission, bladder cancer, CKD III, PAD, HTN, and GAVE who presented after a mechanical fall at home with persistent nausea, found to have an elevated troponin of 1.114 and TWI concerning for NSTEMI.    Patient is seen at bedside with 2 daughters, 1 of whom is an ED provider here at the [facility].  Dee Dee (ED provider daughter) states that her mother was recently admitted for a UTI for which she was treated, and has been recovering from that infection.  She notes that her mother has been still feeling weak from the infection, but was active around the house.  Patient states that yesterday she was reaching for something in her kitchen cabinets and fell on the rug backwards landed on her bottom and  hit her head on the kitchen sink.  She does not recall losing consciousness, but she notes that she was able to drag herself over to softer ground on the carpet.  She was eventually found by her daughter approximately 3-1/2 hours later, patient then noted that she was feeling nauseous but comfortable enough to go to bed.  She then woke up in the morning still feeling nauseous and was encouraged by her daughter to go to the emergency room to be evaluated.  During this entire time she did not have any chest pain.  She denies any prior heart disease, daughter states that she had a femoral artery stent many years ago and has had recurrent DVTs for which she is on chronic warfarin.     In the ED her vitals showed heart rate of 98 blood pressure 112/78, satting well on room air.  Labs revealed a potassium 6.0, creatinine 1.8 (baseline 1.5), BUN of 43, hemoglobin 7.5, platelets 23, INR 1.9, troponin of 1.117.  EKG revealed T wave inversions in V1 through V6.  Chest x-ray showed no acute pathology, head CT was normal, lumbar CT was negative for fracture.  She was given a normal saline bolus, 1 g of calcium gluconate, and magnesium 1 g. She was admitted to cards 1 given elevated troponin and EKG changes concerning for NSTEMI.         Hospital Course by Problem:      # Elevated troponin, likely demand ischemia  Did not present with any chest pain, incidentally found to have TWI in V1-6, troponin 1.116 after mechanical fall at home, trended down to 0.706. CAD risk factors include CKD III, PAD, and HTN. No prior stress tests or angiograms in our records. TTE on 6/24 showed normal heart function and structure, no wall motion abnormality. After extensive discussion with patient and daughters (one of whom is an ED physician here), we agreed to hold off on ASA therapy given her risk for bleeding with concurrent coumadin and GAVE. They were agreeable to restarting simvastatin, had been held for a few month prior to admission because  "she had been having stomach upset before, unclear if simvastatin was the cause. We discussed that benefit of statin at this age for further primary prevention for cardiovascular events is questionable given limited data.  - restart PTA simvastatin 40 mg daily     # Mechanical fall  # Generalized weakness  No fractures or abnormalities on head CT. Suspect dehydration and deconditioning contributing to fall as well. OT evaluated patient and recommended home PT and OT. Pt and daughter were agreeable with the plan.  - tylenol PRN  - home PT and OT ordered     # ADRIANNA on CKD III, resolved  Baseline Cr 1.5, up to 1.8 on admission. UA showed spec grav 1.011, WBC 17. BUN:Cr > 20 suggest prerenal issue, likely dehydration. Cr improved to 1.6 on discharge    # Hyperkalemia, improved  In setting of ADRIANNA and dehydration. Was stabilized with Ca gluconate 1 g x 1, shifted with insulin and D50 over night. Last K check on discharge was 5.4.   - standing order for K check x 10 per daughter request     Chronic issues:  # Recurrent b/l DVT: continue PTA wafarin, recheck INR on Wed  # PAD s/p femoral artery stent: was off simvastatin, will start atorvastatin instead  # GAVE, chornic normocytic anemia: Hgb at baseline 7-8, no signs of bleeding, no h/o liver disease  # Breast cancer: in remission, continue PTA anastrazole  # GERD: continue PTA PPI  # Depression: continue PTA mirtazapine    Physical Exam on day of Discharge:  Blood pressure 114/55, pulse 84, temperature 97.5  F (36.4  C), temperature source Oral, resp. rate 16, height 1.549 m (5' 1\"), weight 53.3 kg (117 lb 6.4 oz), SpO2 91 %.  General:  Alert, lying in bed, no acute distress.  HEENT: Atraumatic, anicteric sclera, extraocular motion intact, nares dry, mucus membranes moist.  CV: Regular rate and rhythm. S1, S2+.  No murmurs, rubs or gallops. JVD not visualized.  Resp: Clear to auscultation bilaterally, no accessory muscle use.  Abdomen: Bowel sounds +, nondistended, soft, " nontender, no hepatosplenomegaly, no masses.  Extremities: No peripheral edema, warm and well perfused.  Skin: No rashes, bruising, or jaundice.  Neuro: Alert, oriented x 3, symmetric facial expressions, moving extremities equally  Psych: Appropriate affect.         Pending Results:   None          Discharge Medications:     Current Discharge Medication List      CONTINUE these medications which have NOT CHANGED    Details   anastrozole (ARIMIDEX) 1 MG tablet Take 1 mg by mouth daily      denosumab (PROLIA) 60 MG/ML SOSY injection Inject 60 mg Subcutaneous every 6 months      mirtazapine (REMERON) 15 MG tablet Take 15 mg by mouth At Bedtime      omeprazole (PRILOSEC) 20 MG DR capsule Take 2 capsules (40 mg) by mouth daily  Qty: 90 capsule, Refills: 1    Associated Diagnoses: Anemia, unspecified type      Vitamin D, Cholecalciferol, 1000 units TABS Take 1 tablet by mouth daily      warfarin (COUMADIN) 2.5 MG tablet Take 3.75 mg by mouth on Monday, Thursday, and Saturday, and take 2.5 mg by mouth on all other days.      simvastatin (ZOCOR) 40 MG tablet Take 40 mg by mouth At Bedtime                Discharge Instructions and Follow-Up:     Discharge Procedure Orders   Potassium   Standing Status: Standing Number of Occurrences: 10 Standing Exp. Date: 06/24/20     Reason for your hospital stay   Order Comments: Elevated troponin, recent mechanical fall, hyperkalemia     Follow Up and recommended labs and tests   Order Comments: Follow up with PCP as needed, placed 10 standing orders for potassium check.     Activity   Order Comments: Your activity upon discharge: activity as tolerated     Order Specific Question Answer Comments   Is discharge order? Yes      Discharge Instructions   Order Comments: You were admitted for an elevated troponin and a recent mechanical fall. Your echocardiogram did not show any evidence of a heart attack or heart failure. You can consider doing stress/exercise cardiac testing as an  outpatient if you choose. You can follow up with potassium checks as needed.     Diet   Order Comments: Follow this diet upon discharge: Orders Placed This Encounter      Snacks/Supplements Adult: Boost Breeze; Between Meals      Snacks/Supplements Adult: Nepro Oral Supplement; Between Meals      Combination Diet Low Saturated Fat Na <2400mg Diet; No Caffeine for 24 hours (once tests completed, may have caffeine)     Order Specific Question Answer Comments   Is discharge order? Yes           Discharge Disposition:   Home         Condition on Discharge:   Discharge condition: Good   Code status on discharge: Full Code      Date of service: 6/24/2019    The patient was discussed with Dr. Levine.    Santana Ramirez MD  Internal Medicine PGY-2  Pager [phone]    I very much appreciated the opportunity to see and assess Graciela Hopkins in the hospital on her Discharge day with Resident Dr Ramirez and resident team. I discussed lab findings and clinical status with patients's daughter who is a ED MD/ The daughter indicated that the family favors a very limited approach to further evaluating cardiac status.  I agree with the note above summarizes my findings and current recommendations.  Please do not hesitate to contact my office if you have any questions or concerns.      Jitendra Levine MD  Cardiac Arrhythmia Service  [facility]  [phone]

## 2019-06-24 NOTE — PROVIDER NOTIFICATION
Patient's blood glucose was 55. Notified Cards 1 MD who requested to start a dextrose 10% infusion. Also notified MD of low blood pressures. MD was not worried about BP r/t dehydration and the fact that the patient is sleeping and asymptomatic. Will continue to monitor and assess.

## 2019-06-24 NOTE — PHARMACY-ANTICOAGULATION SERVICE
Clinical Pharmacy - Warfarin Dosing Consult     Pharmacy has been consulted to manage this patient s warfarin therapy.  Indication: DVT/PE Prophylaxis  Therapy Goal clarification: INR 1.8-2.5 per anticoagulation clinic notes  Provider/Team: Marcy Samson  OP Anticoag Clinic: [facility]  Warfarin Prior to Admission: Yes  Warfarin PTA Regimen: 2.5mg at home  Significant drug interactions: Trimethoprim d/c 6/7    INR   Date Value Ref Range Status   06/24/2019 2.12 (H) 0.86 - 1.14 Final   06/23/2019 1.90 (H) 0.86 - 1.14 Final     Please contact pharmacy as soon as possible if the warfarin needs to be held for a procedure or if the warfarin goals change.      Viola Linn, Pharm.D., [facility]  Pager [phone]

## 2019-06-24 NOTE — PLAN OF CARE
D: Fell at home, elevated troponin, hyperkalemia, and nausea. PMH breast cancer remission, bladder cancer, CKD III, PAD, HTN and GAVE.    I: Gave medications as scheduled. Checked BG Q30 mins x4, and Q1 hr x2. Started IV dextrose around 0000 for BG of 55. D/C'd dextrose around 0330 per MD and .     A: Alert and oriented, but forgetful. Low BPs overnight, MD aware and not concerned r/t dehydration, sleeping, and asymptomatic. RA. SR with occasional PVCs. Voiding. Calls appropriately for assistance to bathroom, uses cane, assistx1. Left PIV running continuous sodium chloride.     P: Echo today. Continue to monitor blood glucose and potassium. Continue to monitor and assess and notify Cards 1 of any changes.

## 2019-06-24 NOTE — PROVIDER NOTIFICATION
Notified Cards 1 about potassium 5.4 and hemoglobin 7.5 this morning. MD said they are not concerned because the results are similar to prior results.

## 2019-06-24 NOTE — PROGRESS NOTES
"CLINICAL NUTRITION SERVICES - ASSESSMENT NOTE     Nutrition Prescription    RECOMMENDATIONS FOR MDs/PROVIDERS TO ORDER:  None at this time.    Malnutrition Status:    Non-severe malnutrition in the context of chronic illness    Recommendations already ordered by Registered Dietitian (RD):  Oral supplements    Future/Additional Recommendations:  1. If able, consider liberalizing diet order to help encourage oral intake. Monitor potential need for diet modification to a 3 g/day potassium restriction.   2. Encourage adequate fluid intake.   3. Consider scheduling antiemetics/antinauseants ~20 minutes prior to meals to help optimize nausea control.     4. If appropriate, consider an appetite stimulant. Note, pt is already on remeron.   5. Consider ordering a multivitamin with minerals to help ensure micronutrient needs are met.   6. Consider checking vitamin D status. Pt is ordered to receive supplementation.      REASON FOR ASSESSMENT  Graciela Hopkins is a/an 89 year old female assessed by the dietitian for Admission Nutrition Risk Screen for unintentional loss of 10# or more in the past two months and reduced oral intake over the last month and Patient/Family Request    NUTRITION HISTORY  Per RD note 5/17/19, \"Patient reports that over the past two days she has just been feeling week with poor PO intake. Per providers note, patient's  of 56 years passed of 2 years ago and she has been trying to cope with it since then. Per daughter she has been eating less since that time.\" Pt was ordered to receive Boost Glucose Control during 5/2019 admission.   Per H & P, \"h/o breast cancer in remission, bladder cancer, CKD III, PAD, HTN, and GAVE who presented after a mechanical fall at home with persistent nausea, found to have an elevated troponin of 1.114 and TWI concerning for NSTEMI (vs demand ischemia).\" Per chart, pt was weak from UTI but active around the house. Pt with a shellfish allergy. Pt was ordered to take, " "noting, vitamin D PTA.   Per pt, she moved about two years ago and lost 20# (9.1 kg). Her  passed away and she continued to lose more wt. Per pt, generalized lack of appetite which has been ongoing. Eating less than two to three years ago. She normally does not have N/V, diarrhea, or constipation. Pt states her daughter has purchased oral supplements for her but she did not care for them. Note, pt is a little forgetful.     CURRENT NUTRITION ORDERS  Diet: Low Saturated Fat/2400 mg Sodium + no caffeine  Intake/Tolerance: Pt consumed 50% of a meal with a fair appetite. Pt's meal yesterday contained ~900 mg K+. Pt likes orange juice and seems to like fruit.     LABS  Labs reviewed    MEDICATIONS  Medications reviewed  Note, on remeron.     ANTHROPOMETRICS  Height: 154.9 cm (5' 1\")  Most Recent Weight: 53.3 kg (117 lb 6.4 oz)    IBW: 47.7 kg   BMI: Normal BMI  Wt Hx: 73.3 kg (7/30/15), 65.3 kg (4/20/17), 63.1 kg (3/15/18), 63.5 kg (6/1/18), 60.8 kg (11/2/18), 53.5 kg (5/28/19), 52.6 kg (6/7/19) - Pt has lost 16% of her body wt over the past approximate year.  Dosing Weight: 53 kg (based on lowest wt this admission of 53.3 kg on 6/24)    ASSESSED NUTRITION NEEDS  Estimated Energy Needs: 7472-8632 kcals/day (25 - 30 kcals/kg)  Justification: Maintenance needs  Estimated Protein Needs: 53-64 grams protein/day (1 - 1.2 grams of pro/kg)  Justification: Increased needs with stress factors  Estimated Fluid Needs: 1458-2601 mL/day (25 - 30 mL/kg)   Justification: Maintenance needs or per team, pending fluid status (may need additional fluid if hypovolemic)    PHYSICAL FINDINGS  See malnutrition section below.    MALNUTRITION  % Intake: < 75% for >/= 3 months (non-severe)  % Weight Loss: Up to 10% in 6 months (non-severe)  Subcutaneous Fat Loss: Facial region: Mild, although suspect age-related  Muscle Loss: Temporal:  Mild and Thoracic region (clavicle, acromium bone, deltoid, trapezius, pectoral):  Mild, although " "suspect age-related  Fluid Accumulation/Edema: None noted  Malnutrition Diagnosis: Non-severe malnutrition in the context of chronic illness    NUTRITION DIAGNOSIS  Inadequate oral intake related to decreased appetite as evidenced by pt reported of eating less has lost 16% of her body wt over the past approximate year.      INTERVENTIONS  Implementation  Nutrition Education: Suggested small, frequent meals. Encouraged intake of high kcal/protein foods and discussed options to try. Gave handout \"Coping with Poor Appetite and Weight Loss.\"  Also, rec oral supplements.   Medical food supplement therapy: Ordered to try orange Boost Breeze at 10:00 and berry or butter pecan Nepro at HS.      Goals  Patient to consume % of nutritionally adequate meal trays TID, or the equivalent with supplements/snacks.     Monitoring/Evaluation  Progress toward goals will be monitored and evaluated per protocol.     Nutrition will continue to follow.      Anusha Hernandez, MS, RD, LD, [facility]   6C Pgr: [phone]   "

## 2019-06-24 NOTE — DISCHARGE INSTRUCTIONS
You were admitted for an elevated troponin and a recent mechanical fall. Your echocardiogram did not show any evidence of a heart attack or heart failure. You can consider doing stress/exercise cardiac testing as an outpatient if you choose. You can follow up with potassium checks as needed.

## 2019-06-24 NOTE — PLAN OF CARE
D: Pt presented after a mechanical fall at home with persistent nausea, found to have an elevated troponin of 1.114 and TWI concerning for NSTEMI.    I: Monitored vitals and assessed pt status.   Running: NS @ 75cc/hr  PRN:     A: A0x4- forgetful. VSS. BP soft- 80-90s systolic, no report of lightheadedness/dizziness. Afebrile. NSR. Voided x1 this shift. UA done in ED- leukocytes present, increased WBCs. BM x1 this evening. Per pt and family report- pt has been eating poorly for the last couple months. Nutrition consult placed. Cardiac diet, no caffeine. Trops trending down- last check 0.715 (C1 notified/aware). No chest pain. K @ 2100 5.9- per cross cover to treat with insulin/dextrose- given. BG prior to administration- 88. Will continue to monitor BG per order. K recheck @ 0200. Mag 1.5 replaced with 2g IV Mag. Hgb stable @ 7.5- no blood products at this time per cross cover. Up with assist x1/cane. Daughters supportive- contact info on communication board in pts room. Pt very pleasant, cooperative.       P: BG checks- post insulin/dextrose administration. K recheck. Echo tomorrow. Continue to monitor Pt status and report changes to treatment team.

## 2019-06-24 NOTE — PROGRESS NOTES
Cardiology Progress Note  Graciela Hopkins MRN: [id number]  Age: 89 year old, : 19          Changes Today:     - stop ASA for now given risk of bleed  - trend BMP BID  - nutrition consult  - PT/OT consult        Assessment and Plan:     Mrs. Hopkins is a 89 y.o. 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 NSTEMI.    Elevated Troponin - Mild, decreasing  Likely Demand Ischemia  Incidentally found to have T wave inversions in I, aVL, V5, V6 with troponin peak to 1.114 after mechanical fall at home. No anginal sxs including CP or RUIZ, although intermittent nausea could by an atypical presentation of angina. Findings are likely secondary to demand ischemia, although she has not had a prior ischemic evaluation. CAD risk factors include CKD III, PAD, and HTN. TTE on  showed no WMA, normal EF 55-60%. Pt may may not be good candidate for either catheterization d/t CKD or DAPT given bleeding risk. In the absence of clear anginal sxs, intervention would not yield mortality nor likely morbidity benefit  - stop ASA for now given risk of bleed  - continue atorvastatin 40 mg daily  - cardiac diet     Mechanical Fall, Recurrent  Generalized Weakness  Has had 3 mechanical falls over recent weeks likely d/t deconditioning since loss of  3/2019. No acute fractures or abnormalities noted on head or lumbar CT.   - Tylenol for pain PRN  - discuss discontinuation of chronic anticoagulation with pt and family given high risk of bleeding with falls and given chronic anemia  - PT/OT consulted, appreciate reccs  - fall precautions     ADRIANNA on CKD III - improving  Cr  increased to 1.8 at admission, now improved to 1.65 from baseline of ~1.5. Likely pre-renal etiology given pt report of low PO intake, BUN:CR>20, and improvement with IVF.  - Trend BMP  - Continue mIVF NS 75 ml/hr  - Encourage PO intake  - Avoid nephrotoxins     Hyperkalemia - Improving  Improving to 5.4 from 6.3 at admission, s/p 1 g calcium gluconate in ED, D50 and insulin x1. In setting of ADRIANNA and dehydration.  - Trend BMP BID     Chronic Issues:  # Recurrent B/L DVT  - Continue PTA warfarin. Consider discontinuation at discharge given bleeding risk, as above.    # PAD s/p Femoral Artery Stent  Previously on simvastatin.  - Continue atorvastatin    # GAVE, Chornic Normocytic Anemia   Hgb at baseline, 7-8. No signs of bleeding. No h/o liver disease.  - Judicious use of anticoagulation    # Breast Cancer  In remission.  - Continue PTA anastrazole    # GERD  - Continue PTA omeprazole    # Depression  - Continue PTA mirtazapine    FEN: Cardiac diet. NS 75 mL/hr.  Ppx: PTA warfarin  Disposition: Anticipate discharge tomorrow, pending TTE results.  CODE: Full    Ronda Richards, MS4    Patient was seen and discussed with Dr. Levine, who agrees with the assessment and plan.    I was present with the medical student who participated in the service and in the documentation of the notes. I have verified the history and personally performed the physical exam and medical decision making. I agree with the assessment and plan of care as documented in the note. My additions are marked in blue, corrections are .    Santana Ramirez MD  Internal Medicine, PGY-2  Pager [phone]    June 24, 2019          Subjective     RN notes reviewed, SHRUTHI. Troponin trended down to 0.7 overnight. Glucose was down to 55 so D10 gtt was started. K was up to 5.9 so insulin was started as well. Feeling at baseline. Poor appetite, anhedonia since loss of spouse. No chest pain or discomfort, palpitations, lightheadedness or dizziness, dyspnea,  orthopnea, edema, abdominal pain, n/v, diarrhea, fever/chills, myalgias, bleeding or bruising.           Objective     Vitals:  Temp:  [97.9  F (36.6  C)-98.4  F (36.9  C)] 98.3  F (36.8  C)  Pulse:  [87-98] 87  Heart Rate:  [78-94] 78  Resp:  [16-20] 16  BP: ()/(46-78) 91/51  SpO2:  [90 %-100 %] 94 %    Vitals:    19 0950 19 0312   Weight: 53.8 kg (118 lb 11.2 oz) 53.3 kg (117 lb 6.4 oz)     Intake/Output Summary (Last 24 hours) at 2019 1318  Last data filed at 2019 1000  Gross per 24 hour   Intake 520 ml   Output 350 ml   Net 170 ml     General: Resting comfortably in bed.  HEENT: NC/AT. Sclera anicteric. EOM grossly intact.  CV: RRR. Normal S1/S2. Grade II systolic murmur best appreciated at the R 2nd intercostal space. No rub or gallop.  Respiratory: Good effort. Clear to auscultation bilaterally. No wheezes or crackles.  Abdomen: Nondistended, soft, nontender.  Extremities: No peripheral edema. Warm and well perfused. Small bruise on R medial calf.   Neurologic: Alert and oriented to situation. Symmetric facial expressions. Moving extremities equally.  Psychiatric: Restricted affect. Intermittently tearful when discussing  .           Data     Most Recent 3 CBC's:  Recent Labs   Lab Test 19  1043 19  1033 19  0738   WBC 4.3  --  5.1 5.9   HGB 7.5* 7.5* 8.0* 7.8*   *  --  97 100   *  --  183 135*      Most Recent 3 BMP's:  Recent Labs   Lab Test 19  0619 19  0148 19  1043 19  1033 19  0556     --   --   --  137 140 139   POTASSIUM 5.4* 5.3 5.9*   < > 6.0* 6.3* 4.1   CHLORIDE 115*  --   --   --   --  113* 110*   CO2 18*  --   --   --   --  21 16*   BUN 33*  --   --   --   --  43* 36*   CR 1.65*  --   --   --   --  1.80* 1.88*   ANIONGAP 6  --   --   --   --  6 12   FRANCISCA 8.5  --   --   --   --  8.4* 7.6*   GLC 90  --   --   --  89 85 74    < > = values in this interval not  displayed.     Most Recent 2 LFT's:  Recent Labs   Lab Test 19  1033 05/16/19  2014   AST 31 14   ALT 18 17   ALKPHOS 52 47   BILITOTAL 0.5 0.8      Most Recent 3 Troponin's:  Recent Labs   Lab Test 19  1647 19   TROPI 0.715* 1.114* <0.015     Most Recent 3 INR's:  Recent Labs   Lab Test 19  0619 193 19  0556   INR 2.12* 1.90* 2.69*     Most Recent Urinalysis:  Recent Labs   Lab Test 19  1454   COLOR Light Yellow   APPEARANCE Clear   URINEGLC Negative   URINEBILI Negative   URINEKETONE Negative   SG 1.011   UBLD Negative   URINEPH 5.0   PROTEIN 10*   NITRITE Negative   LEUKEST Moderate*   RBCU 1   WBCU 17*     Recent Results (from the past 4320 hour(s))   Echocardiogram Complete    Narrative    [id number]  [id number]  [id number]  [id number]^LAURITA^THADDEUS           [facility],[place]  Echocardiography Laboratory  [address] [place] [id number]     Name: RADHA REGALADO  MRN: [id number]  : 1925  Study Date: 2019 11:06 AM  Age: 89 yrs  Gender: Female  [place] [facility] [place]  Reason For Study: Chest Pain, Chest Tightness, Chest Pressure  Ordering Physician: THADDEUS SHAY  Referring Physician: Car Radford  Performed By: Glo Bledsoe RDCS     BSA: 1.5 m2  Height: 61 in  Weight: 118 lb  HR: 91  BP: 112/78 mmHg  _____________________________________________________________________________  __        Procedure  Complete Portable Echo Adult.  _____________________________________________________________________________  __        Interpretation Summary  Global and regional left ventricular function is normal with an EF of 55-60%.  Right ventricular function, chamber size, wall motion, and thickness are  normal.  The inferior vena cava is normal.  No pericardial effusion is present.  _____________________________________________________________________________  __        Left Ventricle  Global and  regional left ventricular function is normal with an EF of 55-60%.  Left ventricular wall thickness is normal. Left ventricular size is normal.  Left ventricular diastolic function is indeterminate. No regional wall motion  abnormalities are seen.     Right Ventricle  Right ventricular function, chamber size, wall motion, and thickness are  normal.     Atria  Severe biatrial enlargement is present.     Mitral Valve  Mild mitral annular calcification is present. Mild mitral insufficiency is  present.        Aortic Valve  Moderate aortic valve calcification is present. Mild aortic insufficiency is  present. Mild aortic stenosis is present. The mean AoV pressure gradient is  12.3 mmHg. The calculated aortic valve are is 1.2 cm^2.     Tricuspid Valve  Moderate tricuspid insufficiency is present. The right ventricular systolic  pressure is approximated at 32.9 mmHg plus the right atrial pressure.     Pulmonic Valve  The pulmonic valve is normal.     Vessels  The pulmonary artery and bifurcation cannot be assessed. The inferior vena  cava is normal. Ascending aorta 3.5 cm.     Pericardium  No pericardial effusion is present.        Compared to Previous Study  Previous study not available for comparison.  _____________________________________________________________________________  __  MMode/2D Measurements & Calculations  IVSd: 0.77 cm     LVIDd: 5.0 cm  LVIDs: 2.6 cm  LVPWd: 0.90 cm  FS: 47.7 %  LV mass(C)d: 144.4 grams  LV mass(C)dI: 95.7 grams/m2  Ao root diam: 3.0 cm  asc Aorta Diam: 3.5 cm  LVOT diam: 2.0 cm  LVOT area: 3.1 cm2  LA Volume (BP): 85.4 ml  LA Volume Index (BP): 56.6 ml/m2  RWT: 0.36           Doppler Measurements & Calculations  MV E max argelia: 88.8 cm/sec  MV A max argelia: 78.6 cm/sec  MV E/A: 1.1  MV dec slope: 525.0 cm/sec2  MV dec time: 0.17 sec  Ao V2 max: 228.0 cm/sec  Ao max P.0 mmHg  Ao V2 mean: 165.3 cm/sec  Ao mean P.3 mmHg  Ao V2 VTI: 51.8 cm  ROBERTO(I,D): 1.2 cm2  ROBERTO(V,D): 1.4 cm2  AI  P1/2t: 228.9 msec  LV V1 max PG: 3.9 mmHg  LV V1 max: 99.2 cm/sec  LV V1 VTI: 20.0 cm  SV(LVOT): 62.8 ml  SI(LVOT): 41.6 ml/m2  PA V2 max: 102.0 cm/sec  PA max P.2 mmHg  PA acc time: 0.08 sec  TR max beau: 286.7 cm/sec  TR max P.9 mmHg  AV Beau Ratio (DI): 0.43  ROBERTO Index (cm2/m2): 0.80  E/E' av.9  Lateral E/e': 10.7  Medial E/e': 13.0        _____________________________________________________________________________  __        Report approved by: Akil Cordova 2019 01:55 PM              Medications     Medications    anastrozole  1 mg Oral Daily     aspirin  81 mg Oral Daily     atorvastatin  40 mg Oral QPM     mirtazapine  15 mg Oral At Bedtime     omeprazole  40 mg Oral Daily     sodium chloride (PF)  3 mL Intracatheter Q8H     sodium chloride   Intravenous Once     cholecalciferol  1,000 Units Oral Daily       - MEDICATION INSTRUCTIONS -       - MEDICATION INSTRUCTIONS -       Reason anticoagulation order not selected       - MEDICATION INSTRUCTIONS -       - MEDICATION INSTRUCTIONS -       ACE/ARB/ARNI NOT PRESCRIBED       sodium chloride 75 mL/hr at 19 4087     Warfarin Therapy Reminder

## 2019-06-24 NOTE — PROGRESS NOTES
DISCHARGE   Discharged to: Home w/ daughter  Via: Automobile  Accompanied by: Daughter  Discharge Instructions: diet, activity, medications, home care, follow up appointments, when to call the MD, and what to watchout for (i.e. s/s of infection, increasing SOB, palpitations, chest pain)  Prescriptions: Medication list reviewed & sent with pt  Follow Up Appointments: arranged; information given  Belongings: All sent with pt  IV: out  Telemetry: off  Pt & daughter (caregiver) exhibits understanding of above discharge instructions; all questions answered.  Discharge Paperwork: printed & given

## 2019-06-24 NOTE — PROGRESS NOTES
Admission    Diagnosis: Nausea, fall- elevated trops, mag 1.0, K 6.0  Admitted from: [facility]  Via: Stretcher  Accompanied by: family  Belongings: Placed in closet; valuables sent home with family, declined sending any items to security.  Admission Profile: Complete  Teaching: orientation to unit, call don't fall, use of console, meal times, visiting hours, when to call for the RN (angina/sob/dizziness, etc.), and enforced importance of safety   Access: PIV  Telemetry: Placed on patient  Height/Weight: Complete

## 2019-06-24 NOTE — PROGRESS NOTES
Care Coordinator Progress Note    Admission Date/Time:  6/23/2019  Attending MD:  Maria C att. providers found    Data  Chart reviewed, discussed with interdisciplinary team.   Patient was admitted for:    Fall, initial encounter  Troponin level elevated  Hyperkalemia  Chronic kidney disease, unspecified CKD stage  Acute deep vein thrombosis (DVT) of lower extremity, unspecified laterality, unspecified vein (H)  ACS (acute coronary syndrome) (H).    Concerns with insurance coverage for discharge needs: None.  Current Living Situation: Patient lives with family.  Support System: Supportive and Involved  Services Involved: Home Care and Anticoagulation Clinic  Transportation at Discharge: Family or friend will provide  Transportation to Medical Appointments:   - Name of caregiver: Dee Dee Plascencia, patient's daughter  Barriers to Discharge: None    Coordination of Care and Referrals: Provided patient/family with options for Home Care.       Per team, patient stable to discharge today. Inpatient OT recommends home PT/OT. Patient is staying with her daughter, Dee Dee Plascencia, at [address] [place] [address].     Writer met with patient and her daughter, Dee Dee, to review the role of the care coordinator and assess discharge needs. Patient would like a referral for home PT/OT. Prior to admission, patient was receiving home INR draws through [facility] ([facility], n [phone]). Patient's anticoagulation managed through [facility] (n [phone]).      Per [facility], they do not provide home therapies. Patient's insurance does not allow patient to be open to two home care agencies. Writer updated patient's daughter. Per Dee Dee's request, referral made to [facility] for RN, PT, OT visits; AVS updated. [facility] updated and will discontinue services at this time.     Per discharging provider, INR and potassium to be rechecked Thurs, 6/27 (or initial home care visit); Instructions included on  AVS.    Patient and daughter had no further questions or concerns at the time of our conversation.      Plan  Anticipated Discharge Date:  6/24/2019  Anticipated Discharge Plan:  Home with assist from family, home care services and clinic follow up as recommended by the team    Jackelin hWite RNCC

## 2019-06-24 NOTE — PROGRESS NOTES
"   06/24/19 1400   Quick Adds   Type of Visit Initial Occupational Therapy Evaluation   Living Environment   Lives With child(tata), adult;grandchild(tata)   Living Arrangements house   Home Accessibility stairs to enter home;stairs within home   Number of Stairs, Main Entrance   (2 at front-no railing; 3-4 at side with railing)   Number of Stairs, Within Home, Primary other (see comments)  (13)   Stair Railings, Within Home, Primary other (see comments)  (2 stairs with no railing + 11 stairs with bilateral railing)   Transportation Anticipated family or friend will provide   Living Environment Comment Pt has been living with her daugther and grandson since last admission and per daughter is in process of having pt move in with daughter permanently at this time. Daugther in process of remodeling home to increase accessibility for patient. Currently pt's bedroom on upper level as well as only bathroom.    Self-Care   Usual Activity Tolerance moderate   Current Activity Tolerance moderate   Regular Exercise No   Equipment Currently Used at Home commode;cane, straight;walker, standard;walker, rolling;shower chair;grab bar, tub/shower  (FWW and 4WW )   Activity/Exercise/Self-Care Comment Pt and daugther report using cane for mobility but have FWW and 4WW as well. Pt uses shower chair for bathing and have placed a commode on main level of home to eliminate need to complete stairs during daytime hours. per david, pt spends time \"tidying\" up the home. Family assists with med management and meal prep   Functional Level   Ambulation 1-->assistive equipment   Transferring 1-->assistive equipment   Toileting 1-->assistive equipment   Bathing 1-->assistive equipment   Dressing 0-->independent   Eating 0-->independent   Communication 0-->understands/communicates without difficulty   Swallowing 0-->swallows foods/liquids without difficulty   Cognition 1 - attention or memory deficits   Fall history within last six months yes " "  Number of times patient has fallen within last six months 3   Which of the above functional risks had a recent onset or change? fall history   Prior Functional Level Comment Pt mod IND with    General Information   Onset of Illness/Injury or Date of Surgery - Date 06/23/19   Referring Physician Santana Ramirez MD   Patient/Family Goals Statement return home   Additional Occupational Profile Info/Pertinent History of Current Problem \"89 yo female with a h/o breast cancer in remission, bladder cancer, CKD III, PAD, HTN, and GAVE who presented after a mechanical fall at home with persistent nausea, found to have an elevated troponin of 1.114 and TWI concerning for NSTEMI.\"   Precautions/Limitations fall precautions   Weight-Bearing Status - LUE full weight-bearing   Weight-Bearing Status - RUE full weight-bearing   Weight-Bearing Status - LLE full weight-bearing   Weight-Bearing Status - RLE full weight-bearing   General Observations Daughter present and supportive   General Info Comments Activity: ambulate with assist   Cognitive Status Examination   Orientation orientation to person, place and time   Level of Consciousness alert   Follows Commands (Cognition) WNL   Memory impaired   Attention Alternating attention impaired, difficulty shifting between tasks   Cognitive Comment Pt appropriate with command following. Some forgetfulness and decreased attention to task but likely age-approrpriate cognitive decline vs acute change.   Sensory Examination   Sensory Quick Adds No deficits were identified   Integumentary/Edema   Integumentary/Edema no deficits were identifed   Posture   Posture protracted shoulders;forward head position   Range of Motion (ROM)   ROM Comment BUE ROM limited to ~90 degrees shoulder flex and abduction but WFL with activity   Strength   Strength Comments generalized weakness in BUEs but no acute change   Coordination   Upper Extremity Coordination No deficits were identified   Mobility   Bed " Mobility Bed mobility skill: Supine to sit;Bed mobility skill: Sit to supine   Bed Mobility Comments Requiring max A x 2 for boosting up in bed   Bed Mobility Skill: Sit to Supine   Level of [place]: Sit/Supine stand-by assist   Bed Mobility Skill: Supine to Sit   Level of [place]: Supine/Sit stand-by assist   Assistive Device: Supine/Sit bedrail   Transfer Skill: Sit to Stand   Level of [place]: Sit/Stand contact guard   Balance   Balance Comments Pt with sitting and static standing balance; impaired dynamic standing balance noted   Lower Body Dressing   Level of [place]: Dress Lower Body minimum assist (75% patients effort)   Instrumental Activities of Daily Living (IADL)   IADL Comments Family assists-pt will fold laundry and light home management   Activities of Daily Living Analysis   Impairments Contributing to Impaired Activities of Daily Living balance impaired;cognition impaired;strength decreased;ROM decreased   General Therapy Interventions   Planned Therapy Interventions ADL retraining;cognition;strengthening;transfer training;progressive activity/exercise;risk factor education   Clinical Impression   Criteria for Skilled Therapeutic Interventions Met yes, treatment indicated   OT Diagnosis decreased IND with ADLs    Influenced by the following impairments impaired balance, cognitive decline, weakness   Assessment of Occupational Performance 1-3 Performance Deficits   Identified Performance Deficits dressing, bathing, functional mobility   Clinical Decision Making (Complexity) Low complexity   Therapy Frequency Daily   Predicted Duration of Therapy Intervention (days/wks) 1 week   Anticipated Discharge Disposition Home with Home Therapy;Home with Assist   Risks and Benefits of Treatment have been explained. Yes   Patient, Family & other staff in agreement with plan of care Yes   Clinical Impression Comments Pt benefit from skilled OT intervention for education on activity and home  "modifications and DME/AE for home to facilitate safe discharge   [facility] AM-PAC TM \"6 Clicks\"   2016, Trustees of [facility], under license to [employer].  All rights reserved.   6 Clicks Short Forms Daily Activity Inpatient Short Form   [facility] AM-PAC  \"6 Clicks\" Daily Activity Inpatient Short Form   1. Putting on and taking off regular lower body clothing? 3 - A Little   2. Bathing (including washing, rinsing, drying)? 3 - A Little   3. Toileting, which includes using toilet, bedpan or urinal? 4 - None   4. Putting on and taking off regular upper body clothing? 4 - None   5. Taking care of personal grooming such as brushing teeth? 4 - None   6. Eating meals? 4 - None   Daily Activity Raw Score (Score out of 24.Lower scores equate to lower levels of function) 22   Total Evaluation Time   Total Evaluation Time (Minutes) 10     "

## 2019-06-25 NOTE — PLAN OF CARE
Occupational Therapy Discharge Summary    Reason for therapy discharge:    Discharged to home with home therapy.    Progress towards therapy goal(s). See goals on Care Plan in [facility] electronic health record for goal details.  Goals not met.  Barriers to achieving goals:   discharge on same date as initial evaluation.    Therapy recommendation(s):    Continued therapy is recommended.  Rationale/Recommendations:  Home OT/PT to maximize ADL/IADL I and I with ambulation.

## 2019-07-09 NOTE — PROGRESS NOTES
This is a recent snapshot of the patient's [facility] medical record.  For current drug dose and complete information and questions, call [phone] or In [facility] pool, fv home infusion ([id number])  CSN Number:  [id number]

## 2019-07-10 NOTE — PROGRESS NOTES
This is a recent snapshot of the patient's [facility] medical record.  For current drug dose and complete information and questions, call [phone] or In [facility] pool, fv home infusion ([id number])  CSN Number:  [id number]

## 2019-07-11 NOTE — PROGRESS NOTES
This is a recent snapshot of the patient's [facility] medical record.  For current drug dose and complete information and questions, call [phone] or In Basket pool, fv home infusion ([id number])  CSN Number:  [id number]

## 2019-07-12 NOTE — PROGRESS NOTES
This is a recent snapshot of the patient's [facility] medical record.  For current drug dose and complete information and questions, call [phone] or In Basket pool, fv home infusion ([id number])  CSN Number:  [id number]

## 2019-07-15 NOTE — PROGRESS NOTES
This is a recent snapshot of the patient's [facility] medical record.  For current drug dose and complete information and questions, call [phone] or In Basket pool, fv home infusion ([id number])  CSN Number:  [id number]

## 2019-07-22 NOTE — PROGRESS NOTES
This is a recent snapshot of the patient's [facility] medical record.  For current drug dose and complete information and questions, call [phone] or In Basket pool, fv home infusion ([id number])  CSN Number:  [id number]

## 2019-09-03 ENCOUNTER — COMMUNICATION - HEALTHEAST (OUTPATIENT)
Dept: ANTICOAGULATION | Facility: CLINIC | Age: 84
End: 2019-09-03

## 2019-09-03 DIAGNOSIS — I82.4Z2 ACUTE VENOUS EMBOLISM AND THROMBOSIS OF DEEP VESSELS OF DISTAL END OF LEFT LOWER EXTREMITY (H): ICD-10-CM

## 2019-11-25 LAB — INTERPRETATION ECG - MUSE: NORMAL

## 2021-05-26 ENCOUNTER — RECORDS - HEALTHEAST (OUTPATIENT)
Dept: ADMINISTRATIVE | Facility: CLINIC | Age: 86
End: 2021-05-26

## 2021-05-27 ENCOUNTER — RECORDS - HEALTHEAST (OUTPATIENT)
Dept: ADMINISTRATIVE | Facility: CLINIC | Age: 86
End: 2021-05-27

## 2021-05-27 NOTE — TELEPHONE ENCOUNTER
ANTICOAGULATION  MANAGEMENT    Assessment     Today's INR result of 2.7 is Therapeutic (goal INR of 2.0-3.0)        Warfarin taken as previously instructed    No new diet changes affecting INR    No new medication/supplements affecting INR    Continues to tolerate warfarin with no reported s/s of bleeding or thromboembolism     Previous INR was Therapeutic     In home lab ordered    Plan:     Spoke with Junaid regarding INR result and instructed:     Warfarin Dosing Instructions:  Continue current warfarin dose 5 mg daily on mon/thu; and 2.5 mg daily rest of week  (0 % change)    Instructed patient to follow up no later than: one week with in home lab      Graciela verbalizes understanding and agrees to warfarin dosing plan.    Instructed to call the [facility] for any changes, questions or concerns. (#[phone])   ?   Caro Branham RN    Subjective/Objective:      Graciela ALEN Hopkins, a 89 y.o. female is on warfarin.     Graciela reports:     Home warfarin dose: as updated on anticoagulation calendar per template     Missed doses: No     Medication changes:  No     S/S of bleeding or thromboembolism:  No     New Injury or illness:  No     Changes in diet or alcohol consumption:  No     Upcoming surgery, procedure or cardioversion:  No    Anticoagulation Episode Summary     Current INR goal:   2.0-3.0   TTR:   47.6 % (3.6 mo)   Next INR check:   4/8/2019   INR from last check:   2.70 (4/1/2019)   Weekly max warfarin dose:      Target end date:   Indefinite   INR check location:      Preferred lab:      Send INR reminders to:   ANTICOAGULATION POOL A (WBY,WBE,MID,RSC)    Indications    Acute venous embolism and thrombosis of deep vessels of distal end of left lower extremity (H) [I82.4Z2]           Comments:            Anticoagulation Care Providers     Provider Role Specialty Phone number    Car Radford MD Referring Internal Medicine [phone]

## 2021-05-27 NOTE — TELEPHONE ENCOUNTER
ANTICOAGULATION  MANAGEMENT    Assessment     Today's INR result of 1.9 is Subtherapeutic (goal INR of 2.0-3.0)        Warfarin taken as previously instructed    No new diet changes affecting INR    No new medication/supplements affecting INR    Continues to tolerate warfarin with no reported s/s of bleeding or thromboembolism     Previous INR was high Therapeutic, dose was reduced.     Plan:     Spoke with Graceila and her daughter Dee Dee regarding INR result and instructed:     Warfarin Dosing Instructions:  Continue current warfarin dose 5 mg daily on Mon; and 2.5 mg daily rest of week    Instructed patient to follow up no later than: 10 days, Thur 4/25. ACN will call [facility] to schedule tomorrow. Patient and daughter are aware    Education provided: target INR goal and significance of current INR result    Graciela and Dee Dee verbalizes understanding and agrees to warfarin dosing plan.    Instructed to call the [facility] for any changes, questions or concerns. (#[phone])   ?   Becca Ferraro RN    Subjective/Objective:      Graciela Hopkins, a 89 y.o. female is on warfarin.     Graciela reports:     Home warfarin dose: verbally confirmed home dose with Graciela and updated on anticoagulation calendar     Missed doses: No     Medication changes:  No     S/S of bleeding or thromboembolism:  No     New Injury or illness:  No     Changes in diet or alcohol consumption:  None specifically, but Graciela and Dee Dee both note that she has had little appetite since her  passed away about a month ago. This may be improving a bit but still not at baseline.      Upcoming surgery, procedure or cardioversion:  No    Anticoagulation Episode Summary     Current INR goal:   2.0-3.0   TTR:   53.1 % (4.1 mo)   Next INR check:   4/25/2019   INR from last check:   1.90! (4/15/2019)   Weekly max warfarin dose:      Target end date:   Indefinite   INR check location:      Preferred lab:      Send INR reminders to:    ANTICOAGULATION POOL A (WBY,WBE,MID,RSC)    Indications    Acute venous embolism and thrombosis of deep vessels of distal end of left lower extremity (H) [I82.4Z2]           Comments:            Anticoagulation Care Providers     Provider Role Specialty Phone number    Car Radford MD Referring Internal Medicine [phone]

## 2021-05-27 NOTE — TELEPHONE ENCOUNTER
ANTICOAGULATION  MANAGEMENT    Assessment     Today's INR result of 3.0 is Therapeutic (goal INR of 2.0-3.0)        Warfarin taken as previously instructed    No new diet changes affecting INR    No new medication/supplements affecting INR    Continues to tolerate warfarin with no reported s/s of bleeding or thromboembolism     Previous INR was Therapeutic     In home lab ordered for one week    Talked with daughter Dee Dee, agrees with plan    Plan:     Spoke with Graciela regarding INR result and instructed:     Warfarin Dosing Instructions:  Change warfarin dose to 5 mg daily on mon; and 2.5 mg daily rest of week  (11 % change)    Instructed patient to follow up no later than: one week      Graciela verbalizes understanding and agrees to warfarin dosing plan.    Instructed to call the [facility] for any changes, questions or concerns. (#[phone])   ?   Caro Branham RN    Subjective/Objective:      Graciela Hopkins, a 89 y.o. female is on warfarin.     Graciela reports:     Home warfarin dose: as updated on anticoagulation calendar per template     Missed doses: No     Medication changes:  No     S/S of bleeding or thromboembolism:  No     New Injury or illness:  No     Changes in diet or alcohol consumption:  No     Upcoming surgery, procedure or cardioversion:  No    Anticoagulation Episode Summary     Current INR goal:   2.0-3.0   TTR:   50.8 % (3.8 mo)   Next INR check:   4/15/2019   INR from last check:   3.00 (4/8/2019)   Weekly max warfarin dose:      Target end date:   Indefinite   INR check location:      Preferred lab:      Send INR reminders to:   ANTICOAGULATION POOL A (WBY,WBE,MID,RSC)    Indications    Acute venous embolism and thrombosis of deep vessels of distal end of left lower extremity (H) [I82.4Z2]           Comments:            Anticoagulation Care Providers     Provider Role Specialty Phone number    Car Radford MD Referring Internal Medicine [phone]

## 2021-05-27 NOTE — TELEPHONE ENCOUNTER
ANTICOAGULATION  MANAGEMENT    Assessment     Today's INR result of 1.8 is Subtherapeutic (goal INR of 2.0-3.0)        Warfarin taken as previously instructed    No new diet changes affecting INR    No new medication/supplements affecting INR    Continues to tolerate warfarin with no reported s/s of bleeding or thromboembolism     Previous INR was Subtherapeutic     In home lab ordered for 4/1    Plan:     Spoke with daughter Dee Dee and left message for Graciela regarding INR result and instructed:     Warfarin Dosing Instructions:  Change warfarin dose to 5 mg daily on mon/thu; and 2.5 mg daily rest of week  (12 % change)    Instructed patient to follow up no later than: 1-2 weeks      Dee Dee verbalizes understanding and agrees to warfarin dosing plan.    Instructed to call the [facility] for any changes, questions or concerns. (#[phone])   ?   Caro Branham RN    Subjective/Objective:      Graciela Hopkins, a 89 y.o. female is on warfarin.     Graciela reports:     Home warfarin dose: verbally confirmed home dose with Dee Dee and updated on anticoagulation calendar     Missed doses: No     Medication changes:  No     S/S of bleeding or thromboembolism:  No     New Injury or illness:  No     Changes in diet or alcohol consumption:  No     Upcoming surgery, procedure or cardioversion:  No    Anticoagulation Episode Summary     Current INR goal:   2.0-3.0   TTR:   45.5 % (3.4 mo)   Next INR check:   4/1/2019   INR from last check:   1.80! (3/25/2019)   Weekly max warfarin dose:      Target end date:   Indefinite   INR check location:      Preferred lab:      Send INR reminders to:   ANTICOAGULATION POOL A (WBY,WBE,MID,RS)    Indications    Acute venous embolism and thrombosis of deep vessels of distal end of left lower extremity (H) [I82.4Z2]           Comments:            Anticoagulation Care Providers     Provider Role Specialty Phone number    Car Radford MD Referring Internal Medicine [phone]

## 2021-05-28 ENCOUNTER — RECORDS - HEALTHEAST (OUTPATIENT)
Dept: ADMINISTRATIVE | Facility: CLINIC | Age: 86
End: 2021-05-28

## 2021-05-28 NOTE — TELEPHONE ENCOUNTER
Refill Request  Did you contact pharmacy: Yes  Medication name:   Requested Prescriptions     Pending Prescriptions Disp Refills     anastrozole (ARIMIDEX) 1 mg tablet 30 tablet 0     mirtazapine (REMERON) 15 MG tablet 90 tablet 3     Sig: Take 1 tablet (15 mg total) by mouth at bedtime.     warfarin (COUMADIN) 2.5 MG tablet 115 tablet 1     Sig: Take 2.5 to 5mg (1 or 2 tabs) by mouth daily as directed.  Adjust dose based on INR.       Pharmacy Name and Location: Changing pharmacy to [facility].    Okay to leave a detailed message: yes

## 2021-05-28 NOTE — TELEPHONE ENCOUNTER
Insurance verified-  Patients daughter stated they have Home health care so they do not need the monitor.

## 2021-05-28 NOTE — TELEPHONE ENCOUNTER
Noted thanks  Lowered dose to 2.5 mg daily, inr thurs with in home lab at [facility]

## 2021-05-28 NOTE — TELEPHONE ENCOUNTER
Refill Approved Mirtazapine change in pharmacy request    Rx renewed per Medication Renewal Policy. Medication was last renewed on 2/20/19.    Last office visit 11/30/18    Jaquan Lockhart, [facility] Triage/Med Refill 5/7/2019     Requested Prescriptions   Pending Prescriptions Disp Refills     anastrozole (ARIMIDEX) 1 mg tablet 30 tablet 0       There is no refill protocol information for this order        mirtazapine (REMERON) 15 MG tablet 90 tablet 3     Sig: Take 1 tablet (15 mg total) by mouth at bedtime.       Tricyclics/Misc Antidepressant/Antianxiety Meds Refill Protocol Passed - 5/7/2019  2:08 PM        Passed - PCP or prescribing provider visit in last year     Last office visit with prescriber/PCP: 11/30/2018 Car Radford MD OR same dept: 11/30/2018 Car Radford MD OR same specialty: 11/30/2018 Car Radford MD  Last physical: 6/1/2018 Last MTM visit: Visit date not found   Next visit within 3 mo: Visit date not found  Next physical within 3 mo: Visit date not found  Prescriber OR PCP: Car Radford MD  Last diagnosis associated with med order: 1. Malignant neoplasm of lower-inner quadrant of left breast in female, estrogen receptor positive (H)  - anastrozole (ARIMIDEX) 1 mg tablet  Dispense: 30 tablet; Refill: 0    2. Depression  - mirtazapine (REMERON) 15 MG tablet; Take 1 tablet (15 mg total) by mouth at bedtime.  Dispense: 90 tablet; Refill: 3    3. Acute venous embolism and thrombosis of deep vessels of distal end of left lower extremity (H)  - warfarin (COUMADIN) 2.5 MG tablet; Take 2.5 to 5mg (1 or 2 tabs) by mouth daily as directed.  Adjust dose based on INR.  Dispense: 115 tablet; Refill: 1    If protocol passes may refill for 12 months if within 3 months of last provider visit (or a total of 15 months).             warfarin (COUMADIN) 2.5 MG tablet 115 tablet 1     Sig: Take 2.5 to 5mg (1 or 2 tabs) by mouth daily as directed.  Adjust dose based on INR.       Warfarin  Refill Protocol  Failed - 5/7/2019  2:08 PM        Failed -  Route to appropriate pool/provider     Last Anticoagulation Summary:   Anticoagulation Episode Summary     Current INR goal:   2.0-3.0   TTR:   54.8 % (4.8 mo)   Next INR check:   5/20/2019   INR from last check:   1.80! (5/6/2019)   Weekly max warfarin dose:      Target end date:   Indefinite   INR check location:      Preferred lab:      Send INR reminders to:   ANTICOAGULATION POOL A (WBY,WBE,MID,RSC)    Indications    Acute venous embolism and thrombosis of deep vessels of distal end of left lower extremity (H) [I82.4Z2]           Comments:            Anticoagulation Care Providers     Provider Role Specialty Phone number    Car Radford MD Referring Internal Medicine [phone]                Passed - Provider visit in last year     Last office visit with prescriber/PCP: 11/30/2018 Car Radford MD OR same dept: 11/30/2018 Car Radford MD OR same specialty: 11/30/2018 Car Radford MD  Last physical: 6/1/2018 Last MTM visit: Visit date not found    Next appt within 3 mo: Visit date not found Next physical within 3 mo: Visit date not found  Prescriber OR PCP: Car Radford MD  Last diagnosis associated with med order: 1. Malignant neoplasm of lower-inner quadrant of left breast in female, estrogen receptor positive (H)  - anastrozole (ARIMIDEX) 1 mg tablet  Dispense: 30 tablet; Refill: 0    2. Depression  - mirtazapine (REMERON) 15 MG tablet; Take 1 tablet (15 mg total) by mouth at bedtime.  Dispense: 90 tablet; Refill: 3    3. Acute venous embolism and thrombosis of deep vessels of distal end of left lower extremity (H)  - warfarin (COUMADIN) 2.5 MG tablet; Take 2.5 to 5mg (1 or 2 tabs) by mouth daily as directed.  Adjust dose based on INR.  Dispense: 115 tablet; Refill: 1    If protocol passes may refill for 6 months if within 3 months of last provider visit (or a total of 9 months).

## 2021-05-28 NOTE — TELEPHONE ENCOUNTER
Medication Question or Clarification  Who is calling: Patient  What medication are you calling about? (include dose and sig)   anastrozole (ARIMIDEX) 1 mg tablet 30 tablet 0 4/15/2019     Sig: TAKE 1 TABLET(1 MG) BY MOUTH DAILY        Who prescribed the medication?: Dr Tinsley  What is your question/concern?: Daughter would like to get a 90 day supply and refills on this medication. Please have Dr Radford fill for patient per daughters request.  Pharmacy: [facility]  Okay to leave a detailed message?: Yes  Site CMT - Please call the pharmacy to obtain any additional needed information.

## 2021-05-28 NOTE — TELEPHONE ENCOUNTER
ANTICOAGULATION  MANAGEMENT    Assessment     Today's INR result of 3.4 is Therapeutic (goal INR of 2.0-3.0)        Warfarin taken as previously instructed    No new diet changes affecting INR    No new medication/supplements affecting INR    Continues to tolerate warfarin with no reported s/s of bleeding or thromboembolism     Previous INR was Therapeutic     In home lab ordered for next inr 5/6    Plan:     Spoke with Dee Dee Owens and in home lab regarding INR result and instructed:     Warfarin Dosing Instructions:  Change warfarin dose to 2.5 mg daily   (12.5 % change)    Instructed patient to follow up no later than: 10 days with in home lab      Graciela verbalizes understanding and agrees to warfarin dosing plan.    Instructed to call the [facility] for any changes, questions or concerns. (#[phone])   ?   Caro Branham RN    Subjective/Objective:      Graciela Hopkins, a 89 y.o. female is on warfarin.     Graciela reports:     Home warfarin dose: as updated on anticoagulation calendar per template     Missed doses: No     Medication changes:  No     S/S of bleeding or thromboembolism:  No     New Injury or illness:  No     Changes in diet or alcohol consumption:  No     Upcoming surgery, procedure or cardioversion:  No    Anticoagulation Episode Summary     Current INR goal:   2.0-3.0   TTR:   54.1 % (4.4 mo)   Next INR check:   5/6/2019   INR from last check:   3.40! (4/25/2019)   Weekly max warfarin dose:      Target end date:   Indefinite   INR check location:      Preferred lab:      Send INR reminders to:   ANTICOAGULATION POOL A (WBY,WBE,MID,RSC)    Indications    Acute venous embolism and thrombosis of deep vessels of distal end of left lower extremity (H) [I82.4Z2]           Comments:            Anticoagulation Care Providers     Provider Role Specialty Phone number    Car Radford MD Referring Internal Medicine [phone]

## 2021-05-28 NOTE — TELEPHONE ENCOUNTER
RN cannot approve Refill Request    RN can NOT refill this medication med is not covered by policy/route to provider.    Jaquan Lockhart, [facility] Triage/Med Refill 5/7/2019    Requested Prescriptions   Pending Prescriptions Disp Refills     anastrozole (ARIMIDEX) 1 mg tablet 30 tablet 0       There is no refill protocol information for this order      Signed Prescriptions Disp Refills    mirtazapine (REMERON) 15 MG tablet 90 tablet 2     Sig: Take 1 tablet (15 mg total) by mouth at bedtime.       Tricyclics/Misc Antidepressant/Antianxiety Meds Refill Protocol Passed - 5/7/2019  2:08 PM        Passed - PCP or prescribing provider visit in last year     Last office visit with prescriber/PCP: 11/30/2018 Car Radford MD OR same dept: 11/30/2018 Car Radford MD OR same specialty: 11/30/2018 Car Radford MD  Last physical: 6/1/2018 Last MTM visit: Visit date not found   Next visit within 3 mo: Visit date not found  Next physical within 3 mo: Visit date not found  Prescriber OR PCP: Car Radford MD  Last diagnosis associated with med order: 1. Malignant neoplasm of lower-inner quadrant of left breast in female, estrogen receptor positive (H)  - anastrozole (ARIMIDEX) 1 mg tablet  Dispense: 30 tablet; Refill: 0    2. Depression  - mirtazapine (REMERON) 15 MG tablet; Take 1 tablet (15 mg total) by mouth at bedtime.  Dispense: 90 tablet; Refill: 2    3. Acute venous embolism and thrombosis of deep vessels of distal end of left lower extremity (H)    If protocol passes may refill for 12 months if within 3 months of last provider visit (or a total of 15 months).

## 2021-05-28 NOTE — TELEPHONE ENCOUNTER
"FYI - Status Update  Who is Calling: Child DaughterDee Dee  Update: Requests a call back from \"Caro\"  1. Patient is in hospital, so if there is a home INR check visit scheduled for today, they would like to cancel it.  2. Would like to discuss patient's dosing, as INR is down to 7.8.  Okay to leave a detailed message?:  Yes    "

## 2021-05-28 NOTE — TELEPHONE ENCOUNTER
I just talked with daughter Dee Dee, Graciela [facility], (on cipro) kidney and has hgb 7.8.   Discharged on warfarin (inr 2.6)  and Dee Dee wonders if we might continue warfarin but try for a lower maintenance goal. pls advise  thanks

## 2021-05-28 NOTE — TELEPHONE ENCOUNTER
ANTICOAGULATION  MANAGEMENT    Assessment     Today's INR result of 1.8 is Subtherapeutic (goal INR of 2.0-3.0)        Warfarin taken as previously instructed    No new diet changes affecting INR    No new medication/supplements affecting INR    Continues to tolerate warfarin with no reported s/s of bleeding or thromboembolism     Previous INR was Supratherapeutic     In home lab inr ordered for 2 weeks    Plan:     Spoke with Graciela and left message for Dee Dee regarding INR result and instructed:     Warfarin Dosing Instructions:  Change warfarin dose to 3.75 mg daily on mon; and 2.5 mg daily rest of week  (7.1 % change)    Instructed patient to follow up no later than: two weeks with in home lab      Graciela verbalizes understanding and agrees to warfarin dosing plan.    Instructed to call the [facility] for any changes, questions or concerns. (#[phone])   ?   Caro Branham RN    Subjective/Objective:      Graciela Hopkins, a 89 y.o. female is on warfarin.     Graciela reports:     Home warfarin dose: as updated on anticoagulation calendar per template     Missed doses: No     Medication changes:  No     S/S of bleeding or thromboembolism:  No     New Injury or illness:  No     Changes in diet or alcohol consumption:  No     Upcoming surgery, procedure or cardioversion:  No    Anticoagulation Episode Summary     Current INR goal:   2.0-3.0   TTR:   54.8 % (4.8 mo)   Next INR check:   5/20/2019   INR from last check:   1.80! (5/6/2019)   Weekly max warfarin dose:      Target end date:   Indefinite   INR check location:      Preferred lab:      Send INR reminders to:   ANTICOAGULATION POOL A (WBY,WBE,MID,RSC)    Indications    Acute venous embolism and thrombosis of deep vessels of distal end of left lower extremity (H) [I82.4Z2]           Comments:            Anticoagulation Care Providers     Provider Role Specialty Phone number    Car Radford MD Referring Internal Medicine [phone]

## 2021-05-28 NOTE — TELEPHONE ENCOUNTER
Refill Request  Did you contact pharmacy: Yes  Medication name: Warfarin -- See below  Requested Prescriptions      No prescriptions requested or ordered in this encounter     Who prescribed the medication: Malina  Pharmacy Name and Location: [facility] -- pharmacy change   Is patient out of medication: No.  5 days left  Patient notified refills processed in 72 hours:    Okay to leave a detailed message: yes    Dee Dee would like to talk to the INR RN regarding this medication. Note PHARMACY CHANGE.     They want a 90 day supply of this medication. Patient and family are trying to get all medications as a 90 day supply and get medications so that they refill on the same day.    Please call Dee Dee.

## 2021-05-29 ENCOUNTER — RECORDS - HEALTHEAST (OUTPATIENT)
Dept: ADMINISTRATIVE | Facility: CLINIC | Age: 86
End: 2021-05-29

## 2021-05-29 NOTE — PROGRESS NOTES
Post Discharge Medication Reconciliation Status: discharge medications reconciled and changed, per note/orders (see AVS)     ASSESSMENT:  1.  Gastroesophageal reflux: Remains on high-dose PPI.  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    2.  Recurrent deep venous thrombosis with history of clotting disorder:  Family would prefer that she stay on an anticoagulant    3.  Situational depression:  Her  recently  on hospice care.  She is now living with her daughter.  Oral intake has been poor.  I would favor continuing mirtazapine.  He has not been on this long enough to assess the benefit    4.  Recurrent UTIs and history of bladder cancer  Formerly saw Dr. Isaias Eid.  She would like a referral to a new urologist since he has retired.    5.  History of breast cancer  She had lumpectomy and is on Arimidex    6.  Hypercholesterolemia:  Her daughter wonders if it is necessary to continue Zocor.  Given her age, comorbidities, and no significant cardiovascular disease, it seems reasonable to go off of it to simplify her medical regimen    7.  Osteoporosis:  She is at high fracture risk.  I would favor continuing Prolia    8.  Essential hypertension:  She formerly was on valsartan-HCTZ which has been discontinued.  Pressure is in the normal range without it.  It should not be restarted  PLAN:  1.  Continue mirtazapine 15 mg daily    2.  Urology appointment for follow-up of bladder cancer    3.  Labs as below.  She will be notified of laboratory test results    4.  Encouraged to increase fluid intake.  Food supplements may be of value    Orders Placed This Encounter   Procedures     Basic Metabolic Panel     HM2(CBC w/o Differential)     Ambulatory referral to Urology     Referral Priority:   Routine     Referral Type:   Consultation     Referral Reason:   Evaluation and Treatment     Requested Specialty:   Urology     Number of Visits Requested:   1      Medications Discontinued During This Encounter   Medication Reason     warfarin (COUMADIN/JANTOVEN) 2.5 MG tablet Duplicate order     valsartan-hydrochlorothiazide (DIOVAN-HCT) 80-12.5 mg per tablet Therapy completed     simvastatin (ZOCOR) 40 MG tablet Therapy completed     trimethoprim (TRIMPEX) 100 mg tablet Alternate therapy     omeprazole (PRILOSEC) 20 MG capsule Therapy completed       Return in about 3 months (around 2019) for Recheck.    ASSESSED PROBLEMS:  1. Gastroesophageal reflux disease with esophagitis  omeprazole (PRILOSEC) 40 MG capsule   2. Malignant neoplasm of urinary bladder, unspecified site (H)  Ambulatory referral to Urology   3. Iron deficiency anemia due to chronic blood loss  HM2(CBC w/o Differential)   4. Chronic renal insufficiency, stage 3 (moderate) (H)  Basic Metabolic Panel   5. Confusion     6. Dizziness     7. Iron deficiency         CHIEF COMPLAINT:  Chief Complaint   Patient presents with     Follow-up     INF       HISTORY OF PRESENT ILLNESS:  Graciela is a 89 y.o. female in for follow-up of multiple concerns.  Her  recently  while on hospice care.  Graciela is distraught about this.  She is now staying with her daughter.  Acknowledges poor intake of food and fluids.  She recently was started on mirtazapine with a dose just increased to 15 mg daily.  She is not aware of any benefit or adverse effect to the present time.  She was taken off of valsartan-HCTZ due to poor oral intake and relatively low blood pressure.  Blood pressure remains in the low normal range.  It should not be restarted.    Her daughter wonders about continuing the simvastatin.  Since she has no known history of cardiovascular disease, I would favor stopping it to simplify her regimen.    She remains on warfarin due to history of recurrent DVT with clotting disorder.  Family would like to continue her on the medication despite a history of recurrent GI bleeding    She remains on high-dose  omeprazole.  She denies current GI upset.  He has a past history of [place]'s esophagus and GAVE    REVIEW OF SYSTEMS:  Ports recent issues with constipation  Comprehensive review of systems is negative except for the multiple positives noted above    PFSH:  Recently .  Now staying with her daughter    TOBACCO USE:  Social History     Tobacco Use   Smoking Status Never Smoker   Smokeless Tobacco Never Used       VITALS:  Vitals:    06/07/19 1423   BP: 108/54   Patient Site: Left Arm   Patient Position: Sitting   Cuff Size: Adult Regular   Pulse: 80   SpO2: 97%   Weight: 116 lb (52.6 kg)     Wt Readings from Last 3 Encounters:   06/07/19 116 lb (52.6 kg)   05/28/19 118 lb (53.5 kg)   12/07/18 139 lb (63 kg)       PHYSICAL EXAM:  Constitutional:   Reveals an alert elderly woman who appears frail and fatigued.  Weight is down 23 pounds from December.  Vitals: per nursing notes.  HEENT: Atraumatic  Eyes:  EOMs full, PERRL.  Oropharynx:   Mouth and throat clear, no thrush or exudate.  Neck:  Supple, no carotid bruits or adenopathy.  Back:  No spine or CVA pain.  Mild thoracic kyphosis  Thorax:  No bony deformities.  Lungs: Clear to A&P without rales or wheezes.  Respiratory effort normal.  Cardiac:   Regular rate and rhythm, normal S1, S2, no murmur or gallop.  Abdomen:  Soft, active bowel sounds without bruits, mass, or tenderness.  Extremities:   No peripheral edema, pulses in the feet intact.    Skin:  No jaundice, peripheral cyanosis or lesions to suggest malignancy.  Pale with scattered ecchymoses  Neuro:  Alert but a rather forgetful historian.  No gross focal deficits.  Psychiatric:  Memory intact, mood appropriate.    DATA REVIEWED:  Additional History from Old Records Summarized (2): None.  Decision to Obtain Records (1): None.   Radiology Tests Summarized or Ordered (1): None.  Labs Reviewed or Ordered (1): None.  Medicine Test Summarized or Ordered (1): None.   Independent Review of EKG or X-RAY(2  each): None.    The visit lasted a total of 25 minutes face to face with the patient. Over 50% of the time was spent counseling and educating the patient about situational depression.        Dragon dictation was used for this note. Speech recognition errors are a possibility.     MEDICATIONS:  Current Outpatient Medications   Medication Sig Dispense Refill     anastrozole (ARIMIDEX) 1 mg tablet TAKE 1 TABLET(1 MG) BY MOUTH DAILY 90 tablet 3     cholecalciferol, vitamin D3, (VITAMIN D3) 2,000 unit Tab Take 1 tablet (2,000 Units total) by mouth daily. 90 tablet 3     magnesium hydroxide (MILK OF MAG) 400 mg/5 mL Susp suspension Take 30 mL by mouth daily as needed.  0     mirtazapine (REMERON) 15 MG tablet Take 1 tablet (15 mg total) by mouth at bedtime. 90 tablet 2     vitamins  A,C,E-zinc-copper (PRESERVISION AREDS) 14,320-226-200 unit-mg-unit cap Take 1 capsule by mouth daily.        warfarin (COUMADIN) 2.5 MG tablet Take 2.5 to 5mg (1 or 2 tabs) by mouth daily as directed.  Adjust dose based on INR. 115 tablet 1     anastrozole (ARIMIDEX) 1 mg tablet TAKE 1 TABLET(1 MG) BY MOUTH DAILY 30 tablet 0     omeprazole (PRILOSEC) 40 MG capsule Take 1 capsule (40 mg total) by mouth daily. 30 capsule 1     Current Facility-Administered Medications   Medication Dose Route Frequency Provider Last Rate Last Dose     denosumab 60 mg (PROLIA 60 mg/ml)  60 mg Subcutaneous Q6 Months Klaus Flores, PharmD   60 mg at 01/22/19 4676

## 2021-05-29 NOTE — TELEPHONE ENCOUNTER
ANTICOAGULATION  MANAGEMENT    Assessment     Today's INR result of 2.2 is Therapeutic (goal INR of 2.0-3.0)        Warfarin taken as previously instructed    No new diet changes affecting INR    No new medication/supplements affecting INR    Continues to tolerate warfarin with no reported s/s of bleeding or thromboembolism     Previous INR was Subtherapeutic     inr order phoned to [facility]    Plan:     Spoke with Dee Dee regarding INR result and instructed:     Warfarin Dosing Instructions:  Continue current warfarin dose 3.75 mg daily on mon/thu/sat; and 2.5 mg daily rest of week  (0 % change)    Instructed patient to follow up no later than: two weeks      Dee Dee verbalizes understanding and agrees to warfarin dosing plan.    Instructed to call the [facility] for any changes, questions or concerns. (#[phone])   ?   Caro Branham RN    Subjective/Objective:      Graciela Hopkins, a 89 y.o. female is on warfarin.     Graciela reports:     Home warfarin dose: verbally confirmed home dose with daughter and updated on anticoagulation calendar     Missed doses: No     Medication changes:  No     S/S of bleeding or thromboembolism:  No     New Injury or illness:  No     Changes in diet or alcohol consumption:  No     Upcoming surgery, procedure or cardioversion:  No    Anticoagulation Episode Summary     Current INR goal:   2.0-3.0   TTR:   44.1 % (6.3 mo)   Next INR check:   7/5/2019   INR from last check:   2.20 (6/20/2019)   Weekly max warfarin dose:      Target end date:   Indefinite   INR check location:      Preferred lab:      Send INR reminders to:   [facility]    Indications    Acute venous embolism and thrombosis of deep vessels of distal end of left lower extremity (H) [I82.4Z2]           Comments:   1.8-2.5 due to anemia         Anticoagulation Care Providers     Provider Role Specialty Phone number    Car Radford MD Referring Internal Medicine [phone]

## 2021-05-29 NOTE — TELEPHONE ENCOUNTER
ANTICOAGULATION  MANAGEMENT    Assessment     Today's INR result of 1.6 is Subtherapeutic (goal INR of 1.8-2.5)        Warfarin taken as previously instructed    No new diet changes affecting INR    No new medication/supplements affecting INR    Continues to tolerate warfarin with no reported s/s of bleeding or thromboembolism     Previous INR was Subtherapeutic     Anemia    [facility] inr ordered for 6/20    Plan:     Spoke with daughter Dee Dee regarding INR result and instructed:     Warfarin Dosing Instructions:  Change warfarin dose to 3.75 mg daily on mon/thu/sat; and 2.5 mg daily rest of week  (6.3 % change)    Instructed patient to follow up no later than: two weeks      Dee Dee verbalizes understanding and agrees to warfarin dosing plan.    Instructed to call the [facility] for any changes, questions or concerns. (#[phone])   ?   Caro Branham RN    Subjective/Objective:      Graciela Hopkins, a 89 y.o. female is on warfarin.     Graciela reports:     Home warfarin dose: verbally confirmed home dose with Dee Dee and updated on anticoagulation calendar     Missed doses: No     Medication changes:  No     S/S of bleeding or thromboembolism:  No     New Injury or illness:  No     Changes in diet or alcohol consumption:  No     Upcoming surgery, procedure or cardioversion:  No    Anticoagulation Episode Summary     Current INR goal:   2.0-3.0   TTR:   45.0 % (5.8 mo)   Next INR check:   6/20/2019   INR from last check:   1.60! (6/6/2019)   Weekly max warfarin dose:      Target end date:   Indefinite   INR check location:      Preferred lab:      Send INR reminders to:   [facility]    Indications    Acute venous embolism and thrombosis of deep vessels of distal end of left lower extremity (H) [I82.4Z2]           Comments:   1.8-2.5 due to anemia         Anticoagulation Care Providers     Provider Role Specialty Phone number    Car Radford MD Referring Internal Medicine [phone]

## 2021-05-29 NOTE — PATIENT INSTRUCTIONS - HE
1.  Continue mirtazapine 15 ng daily.    2.   Schedule Urology  Appointment    3.  Stay off BP med    4.  Stay off Simvastatin for now    5.  See in three months.  Recheck lipids then    6.  Advise fiber supplement daily

## 2021-05-29 NOTE — PROGRESS NOTES
"Graciela comes in today because she is worried about a small lump she noticed just next to the incision and her left breast.  She has had a fairly stressful 6 months.  Her   a couple months ago but was quite ill up until his death.  She was the primary caregiver.    Past medical history is otherwise unchanged.    Physical exam:  /60 (Patient Site: Left Arm, Patient Position: Sitting, Cuff Size: Adult Regular)   Pulse 94   Wt 118 lb (53.5 kg)   SpO2 99%   BMI 23.05 kg/m    General: Elderly but healthy appearing woman in no acute distress.  Breast: Small clearly palpable \"mass\" just underneath the lateral edge of her incision in her lower medial left breast.  No other palpable masses in either breast.  Axilla: No axillary adenopathy.    Procedure: Attempted to do an FNA of the mass but when I put a needle into it you could clearly feel it pop.  Although I did not get anything back there was no mass palpable after doing this so I believe I popped a cyst.    Impression: Left breast cyst versus small seroma cavity.  Since it completely resolved with doing this I do not think she needs any further work-up at this time.    Plan: She is due for mammograms in December and will follow up with me at that time.  "

## 2021-05-29 NOTE — TELEPHONE ENCOUNTER
[place]  [facility] calling in critical potassium of 6.3 drawn today at 3.07.     Writer contacted on call provider Dr. Soliz who advises pt should to to ER. Attempted to contact pt and pt's voicemail box full. LVM on voicemail of pt's daughter Mercy requesting call back.     Reason for Disposition    Lab or radiology calling with CRITICAL test results    Protocols used: PCP CALL - NO TRIAGE-A-AH

## 2021-05-29 NOTE — PROGRESS NOTES
Chief Complaint   Patient presents with     Follow-up     patients   2 months ago and now patient has a lump on left side near incision site patient states it is pain ful

## 2021-05-29 NOTE — TELEPHONE ENCOUNTER
ANTICOAGULATION  MANAGEMENT    Assessment     Today's INR result of 1.4 is Subtherapeutic (goal INR of 2.0-3.0)        Warfarin taken as previously instructed    No new diet changes affecting INR    No new medication/supplements affecting INR    Continues to tolerate warfarin with no reported s/s of bleeding or thromboembolism     Previous INR was Therapeutic    Plan:     Spoke with Graciela regarding INR result and instructed:     Warfarin Dosing Instructions:  Change warfarin dose to 3.75 mg daily on mon/thu; and 2.5 mg daily rest of week  (0 % change)    Instructed patient to follow up no later than: one week with in home lab (orderd for 6/5)      Dee Dee verbalizes understanding and agrees to warfarin dosing plan.    Instructed to call the [facility] for any changes, questions or concerns. (#[phone])   ?   Caro Branham RN    Subjective/Objective:      Graciela E Jenisusan, a 89 y.o. female is on warfarin.     Graciela reports:     Home warfarin dose: as updated on anticoagulation calendar per template     Missed doses: No     Medication changes:  No     S/S of bleeding or thromboembolism:  No     New Injury or illness:  No     Changes in diet or alcohol consumption:  No     Upcoming surgery, procedure or cardioversion:  No    Anticoagulation Episode Summary     Current INR goal:   2.0-3.0   TTR:   46.9 % (5.6 mo)   Next INR check:   6/6/2019   INR from last check:   1.40! (5/30/2019)   Weekly max warfarin dose:      Target end date:   Indefinite   INR check location:      Preferred lab:      Send INR reminders to:   [facility]    Indications    Acute venous embolism and thrombosis of deep vessels of distal end of left lower extremity (H) [I82.4Z2]           Comments:   1.8-2.5 due to anemia         Anticoagulation Care Providers     Provider Role Specialty Phone number    Car Radford MD Referring Internal Medicine [phone]

## 2021-05-29 NOTE — TELEPHONE ENCOUNTER
ANTICOAGULATION  MANAGEMENT    Assessment     Today's INR result of 1.5 is Subtherapeutic (goal INR of 1.8-2.5)        Warfarin taken as previously instructed maybe missed one dose    No new diet changes affecting INR    No new medication/supplements affecting INR    Continues to tolerate warfarin with no reported s/s of bleeding or thromboembolism was complaining of sore calf, but is now using stairs so could be muscle rather than dvt. Dee Dee (is MD) will watch    Previous INR was Therapeutic     In home lab inr ordered for 5/30    Plan:     Spoke with daughter Dee Dee regarding INR result and instructed:     Warfarin Dosing Instructions:  have 3.75 mg tonight to boost then continue current warfarin dose 2.5 mg daily     Instructed patient to follow up no later than: one week    Dee Dee verbalizes understanding and agrees to warfarin dosing plan.    Instructed to call the [facility] for any changes, questions or concerns. (#[phone])   ?   Caro Branham RN    Subjective/Objective:      Graciela Hopkins, a 89 y.o. female is on warfarin.     Graciela reports:     Home warfarin dose: verbally confirmed home dose with Dee Dee and updated on anticoagulation calendar     Missed doses: Yes: maybe one     Medication changes:  No     S/S of bleeding or thromboembolism:  No     New Injury or illness:  No     Changes in diet or alcohol consumption:  No     Upcoming surgery, procedure or cardioversion:  No    Anticoagulation Episode Summary     Current INR goal:   2.0-3.0   TTR:   48.9 % (5.3 mo)   Next INR check:   5/30/2019   INR from last check:   1.50! (5/23/2019)   Weekly max warfarin dose:      Target end date:   Indefinite   INR check location:      Preferred lab:      Send INR reminders to:   [facility]    Indications    Acute venous embolism and thrombosis of deep vessels of distal end of left lower extremity (H) [I82.4Z2]           Comments:   1.8-2.5 due to anemia         Anticoagulation Care Providers     Provider  Role Specialty Phone number    Car Radford MD Referring Internal Medicine [phone]

## 2021-05-29 NOTE — TELEPHONE ENCOUNTER
Orders being requested: BMP AND CBC  Reason service is needed/diagnosis: HOME CARE APPT -- SEE BELOW  When are orders needed by: ASAP  Where to send Orders: Other:  EPIC / HOME CARE RN--SEE BELOW  Okay to leave detailed message?  Yes    Patients daughter is requesting lab orders be added to the home care RN appointment on 6/6. BMP and CBC. Patient has a home care nurse coming sometime on 6/6. Daughter Dee Dee would like to get labs  BMP and CBC taken at this appointment. Patient has INF appointment on 6/7 but Dee Dee would like to get labwork on 6/6.     Please advise. Any questions please call Dee Dee.

## 2021-05-30 ENCOUNTER — RECORDS - HEALTHEAST (OUTPATIENT)
Dept: ADMINISTRATIVE | Facility: CLINIC | Age: 86
End: 2021-05-30

## 2021-05-30 VITALS — BODY MASS INDEX: 27.43 KG/M2 | WEIGHT: 142.8 LBS

## 2021-05-30 VITALS — WEIGHT: 144.3 LBS | HEIGHT: 61 IN | BODY MASS INDEX: 27.24 KG/M2

## 2021-05-30 VITALS — HEIGHT: 61 IN | BODY MASS INDEX: 27.17 KG/M2 | WEIGHT: 143.9 LBS

## 2021-05-30 VITALS — WEIGHT: 145 LBS | BODY MASS INDEX: 27.85 KG/M2

## 2021-05-30 VITALS — HEIGHT: 61 IN | BODY MASS INDEX: 27.32 KG/M2 | WEIGHT: 144.7 LBS

## 2021-05-30 VITALS — BODY MASS INDEX: 26.04 KG/M2 | WEIGHT: 137.9 LBS | HEIGHT: 61 IN

## 2021-05-30 NOTE — TELEPHONE ENCOUNTER
Spoke with Nicolle and relayed PCP message.  Nicolle states [facility] nurses are willing to do the Venofer, because it would only be for two doses.

## 2021-05-30 NOTE — TELEPHONE ENCOUNTER
Patient Returning Call  Reason for call:  Returning phone call.  Information relayed to patient:  Below message relayed to patient's daughter Dee Dee. Dee Dee states she will address this with the home care nurse on Thursday 04/04/2019, and will call back at that time. No additional questions at this time.  Patient has additional questions:  No  If YES, what are your questions/concerns:  No additional questions at this time.  Okay to leave a detailed message?: No call back needed

## 2021-05-30 NOTE — TELEPHONE ENCOUNTER
Hemoglobin is down to 7.0 on current labs.  Kidney function is reasonable.  I would advise a transfusion unless family desires a switch to comfort care type measures.  Do you have Dee Dee's phone number so I can talk with her?

## 2021-05-30 NOTE — TELEPHONE ENCOUNTER
INR result is 1.5  INR   Date Value Ref Range Status   07/12/2019 1.30 (!) 0.9 - 1.1 Final       Will the patient be seen, or did they already see, MD or CNP today? No    Most Recent Warfarin dose day/week  Sunday Monday Tuesday Wednesday Thursday Friday Saturday       2.5 2.5 3.75     Sunday Monday Tuesday Wednesday Thursday Friday Saturday   2.5 3.75 2.5 2.5 3.75         Has the patient missed any doses of Coumadin, Warfarin, Jantoven in the past 7 days? No    Has the patients medications changed since the last visit? No    Has the patient experienced any bleeding recently? No    Has the patient experienced any injuries or illness recently? No    Has the patient experienced any 'new' shortness of breath, severe headaches, or changes in vision recently? No    Has the patient had any changes in their diet, or alcohol consumption? No    Is the patient here today to prepare for any type of upcoming surgery, procedure, or for a cardioversion procedure? No    What phone number can we reach the patient at today? home phone listed in demographics.

## 2021-05-30 NOTE — TELEPHONE ENCOUNTER
IV fluids would be a good idea, but it is difficult to arrange this at home.  I have not seen Graciela since her recent hospital stay, I would advise an emergency room assessment if she is not doing well.  Have not seen lab results back from today yet.

## 2021-05-30 NOTE — TELEPHONE ENCOUNTER
Who is calling:  The patient's daughter  Reason for Call:  The patient's daughter would like to have the labs orders faxed to [facility]. She would also like the orders uploaded to My Chart.  She would like a phone call as soon as this is done. The patient's daughter would this to be marked as urgent.   Date of last appointment with primary care: 6/7/2019  Okay to leave a detailed message: Yes

## 2021-05-30 NOTE — TELEPHONE ENCOUNTER
----- Message from Car Radford MD sent at 7/9/2019  3:43 PM CDT -----  Graciela: Your hemoglobin is excellent at 11.1.  Iron studies are not suggestive of iron deficiency currently, but you have received the Venofer.  Apparently, home health can give the last 2 infusions.  We may as well complete this.  Your magnesium is still slightly low at 1.7.  Continue the magnesium supplement.  The pre-albumin is low at 12.1.  Food supplements rich in protein would be advised.  Kidney tests and other labs are acceptable.  The potassium is at the high end of normal, but acceptable at 5.0.    Car Radford MD

## 2021-05-30 NOTE — TELEPHONE ENCOUNTER
ANTICOAGULATION  MANAGEMENT- Home Care/Care Facility Result    Assessment     Today's INR result of 2.1 is Therapeutic (goal INR of 2.0-3.0)  trying for 1.8-2.5 due to chronic anemia      Warfarin taken as previously instructed    No new diet changes affecting INR    No new medication/supplements affecting INR    Continues to tolerate warfarin with no reported s/s of bleeding or thromboembolism     Previous INR was Therapeutic    Plan:     Spoke with [place] home care nurse José Manuel discussed INR result and instructed:     Warfarin Dosing Instructions: Continue current warfarin dose 3.75 mg daily on mon/thu/sat; and 2.5 mg daily rest of week     Next INR to be drawn: weekly with home care visits      José Manuel verbalizes understanding and agrees to warfarin dosing plan.   ?   Caro Branham RN    Subjective/Objective:      Graciela Hopkins, a 89 y.o. female is established on warfarin.     Home care/care facility RN's report of Graciela INR, recent warfarin dosing, diet changes, medication changes, and symptoms is documented below.    Additional findings: none    Anticoagulation Episode Summary     Current INR goal:   2.0-3.0   TTR:   47.8 % (6.7 mo)   Next INR check:   7/10/2019   INR from last check:   2.10 (7/3/2019)   Weekly max warfarin dose:      Target end date:   Indefinite   INR check location:      Preferred lab:      Send INR reminders to:   [facility]    Indications    Acute venous embolism and thrombosis of deep vessels of distal end of left lower extremity (H) [I82.4Z2]           Comments:   1.8-2.5 due to anemia         Anticoagulation Care Providers     Provider Role Specialty Phone number    Car Radford MD Referring Internal Medicine [phone]

## 2021-05-30 NOTE — TELEPHONE ENCOUNTER
Please let daughter know, Dr Radford is back in the office on Friday.  Per records below, lab orders were faxed to home nursing.  IV fluids are usually done at infusion center, most likely will not be able to do at home.    To encourage hydration, she can try to entice pt in eating cucumbers, frozen fruit, berries,  watermelon which can be a good source of fluid.      If patient is declining, hospice care can be further discussed with Dr Radford.

## 2021-05-30 NOTE — TELEPHONE ENCOUNTER
INR result is 1.3  INR   Date Value Ref Range Status   07/08/2019 1.40 (H) 0.90 - 1.10 Final       Will the patient be seen, or did they already see, MD or CNP today? No    Most Recent Warfarin dose day/week  Sunday Monday Tuesday Wednesday Thursday Friday Saturday   2.5 3.75 2.5 2.5 3.75 2.5 2.5     Sunday Monday Tuesday Wednesday Thursday Friday Saturday                Has the patient missed any doses of Coumadin, Warfarin, Jantoven in the past 7 days? No    Has the patients medications changed since the last visit? Yes iron infusion yesterday and today    Has the patient experienced any bleeding recently? No    Has the patient experienced any injuries or illness recently? No    Has the patient experienced any 'new' shortness of breath, severe headaches, or changes in vision recently? No    Has the patient had any changes in their diet, or alcohol consumption? No    Is the patient here today to prepare for any type of upcoming surgery, procedure, or for a cardioversion procedure? No    What phone number can we reach the patient at today? home phone listed in demographics.

## 2021-05-30 NOTE — TELEPHONE ENCOUNTER
Who is calling:  Patient's daughter Dee Dee  Reason for Call:  Patient is trying to determine where they can go to have patient's blood drawn. Dee Dee wants to take her to [facility] in [facility]. Dee Dee is asking to have the orders sent to My Chart. Transferred to My Chart support to assist.  Date of last appointment with primary care: 6/7/19  Okay to leave a detailed message: Yes [phone]

## 2021-05-30 NOTE — TELEPHONE ENCOUNTER
Daughter is an ER MD. (Dee Dee Plascencia [phone]) Pt is staying with daughter currently.     Patient is not hydrating well since spouse . K+ improves when she hydrates.    Daughter is wondering about doing IV fluids until she is doing better and not so sad. Daughter would be willing to administer the fluids.    Daughter is around all day today. Just doesn't want to have to take her mom to the ER all the time.    Caro Fallon, RN  [facility] Medication Refill and Triage Nurse  2019  11:51 AM

## 2021-05-30 NOTE — TELEPHONE ENCOUNTER
Orders being requested: CBC and CMP  Reason service is needed/diagnosis: José Manuel patient's home care nurse was not able to obtain patient's labs today. Requesting an order be faxed to [facility] in [place].  When are orders needed by: As soon as possible.  Where to send Orders: Please fax the order to: [phone], and reach out to José Manuel to inform her the order has been faxed. José Manuel can be reached at: [phone].  Okay to leave detailed message?  Yes

## 2021-05-30 NOTE — TELEPHONE ENCOUNTER
FYI - Status Update  Who is Calling: Home Care  Update: There will be a delay in the start of care until 6/27/19, patient is having her labs on 6/27/19.  Okay to leave a detailed message?:  Yes     Hypertensive emergency and Acute hypoxic respiratory failure Hypertensive emergency and Acute hypoxic respiratory failure hypertensive urgency

## 2021-05-30 NOTE — TELEPHONE ENCOUNTER
ANTICOAGULATION  MANAGEMENT: Discharge Continuity of Care Review    Hospital admission on  7/4 for hyperkalemia.    Discharge disposition: Home    INR Results:       Recent labs: (last 7 days)     07/03/19 07/04/19  1047 07/05/19  0532 07/06/19  0555 07/07/19  0533   INR 2.10* 2.11* 2.43* 1.94* 1.77*       Warfarin inpatient management: Home regimen continued    Warfarin discharge instructions: home regimen continued     Medication Changes Affecting Anticoagulation: No    Additional Factors Affecting Anticoagulation: No    Plan     Recommend inr with fu ov due to low inr last check            Caro Branham RN

## 2021-05-30 NOTE — TELEPHONE ENCOUNTER
[facility] calling with critical potassium result drawn yesterday.    Marycruz   Drawn 7/3  at 17:51 BMP   Potassium 6.7   Dr.Gary Radford ordered lab reports. Family brought to [facility] lab.     Paged Dr. Michael Arenas through [facility], pager symbol.  No answer, called paging service who called him directly however he called back in the meantime.   He returned call and advised ED.   Called Dee Dee, she is in agreement with this plan and plans to bring her mom to [facility]

## 2021-05-30 NOTE — TELEPHONE ENCOUNTER
LMTCB for pt's daughter Dee Dee.  Please relay PCP message to Dee Dee and get information on pt's home care services so clinic can follow up with home care nurse regarding if pt is appropriate for home IV fluids.

## 2021-05-30 NOTE — TELEPHONE ENCOUNTER
ANTICOAGULATION  MANAGEMENT- Home Care/Care Facility Result    Assessment   3  Today's INR result of 1.3 is Subtherapeutic (goal INR of 2.0-3.0)        Warfarin taken as previously instructed    No new diet changes affecting INR    No new medication/supplements affecting INR    Continues to tolerate warfarin with no reported s/s of bleeding or thromboembolism     Previous INR was Subtherapeutic    Plan:     Spoke with home care Christa discussed INR result and instructed:     Warfarin Dosing Instructions: have booster of 3.75 mg today,  then continue current warfarin dose 3.75 mg daily on mon/thu/sat; and 2.5 mg daily rest of week  (0 % change)    Next INR to be drawn: one week      Graciela verbalizes understanding and agrees to warfarin dosing plan.   ?   Caro Branham RN    Subjective/Objective:      Graciela Hopkins, a 89 y.o. female is established on warfarin.     Home care/care facility RN's report of Graciela INR, recent warfarin dosing, diet changes, medication changes, and symptoms is documented below.    Additional findings: none    Anticoagulation Episode Summary     Current INR goal:   2.0-3.0   TTR:   47.2 % (7 mo)   Next INR check:   7/19/2019   INR from last check:   1.30! (7/12/2019)   Weekly max warfarin dose:      Target end date:   Indefinite   INR check location:      Preferred lab:      Send INR reminders to:   [facility]    Indications    Acute venous embolism and thrombosis of deep vessels of distal end of left lower extremity (H) [I82.4Z2]           Comments:   1.8-2.5 due to anemia         Anticoagulation Care Providers     Provider Role Specialty Phone number    Car Radford MD Referring Internal Medicine [phone]

## 2021-05-30 NOTE — TELEPHONE ENCOUNTER
Orders being requested: Home Care   Skilled Nursing    1 time a week for 3 weeks    3 prn visits  Occupational and Physical Therapy     Evaluation and treat  Reason service is needed/diagnosis: INR management and education on hydration and dehydration.    When are orders needed by: ASAP  Where to send Orders: Phone:  Verbal orders to caller  Okay to leave detailed message?  Yes    Nurse also mentioned she will have the BMP added last night faxed in today but thought Car Radford MD wanted to know the Creatinine level was 1.62

## 2021-05-30 NOTE — TELEPHONE ENCOUNTER
Left detailed message for Cass giving verbal okay for requested orders below.  Advised Cass to call back with further questions.

## 2021-05-30 NOTE — TELEPHONE ENCOUNTER
INR result is 1.6  INR   Date Value Ref Range Status   07/19/2019 1.50 (!) 0.9 - 1.1 Final       Will the patient be seen, or did they already see, MD or CNP today? No    Most Recent Warfarin dose day/week  Sunday Monday Tuesday Wednesday Thursday Friday Saturday         3.75     Sunday Monday Tuesday Wednesday Thursday Friday Saturday   2.5 3.75 3.75 2.5 3.75         Has the patient missed any doses of Coumadin, Warfarin, Jantoven in the past 7 days? No    Has the patients medications changed since the last visit? No    Has the patient experienced any bleeding recently? No    Has the patient experienced any injuries or illness recently? No    Has the patient experienced any 'new' shortness of breath, severe headaches, or changes in vision recently? No    Has the patient had any changes in their diet, or alcohol consumption? No    Is the patient here today to prepare for any type of upcoming surgery, procedure, or for a cardioversion procedure? No    What phone number can we reach the patient at today? [facility] [phone].

## 2021-05-30 NOTE — TELEPHONE ENCOUNTER
Upcoming Appointment Question  When is the appointment: Today  What is your appointment for?: Hospital Follow up  Who is your appointment scheduled with?: PCP only  What is your question/concern?: José Manuel with [facility], is calling with a concern with an order the patient was given upon discharge from the hospital.  Caller stated the order is as follows:    iron sucrose 100 mg iron/5 mL  Quantity:  20 mL  Dose:  200 mg  Start taking on:  7/8/2019  Commonly known as:  VENOFER  200 mg, Intravenous, Every 24 hours    Caller stated that this cannot be done through Home Care and would have to be set up through the clinic or some place that can do that for her.    Caller is asking for this to be addressed during appointment this afternoon.  Okay to leave a detailed message?: Yes  [phone]

## 2021-05-30 NOTE — TELEPHONE ENCOUNTER
Patient's daughter calling regarding patient.  ERLINDA on file. Daughter is upset as she states she has been on the phone for 1.5 hours trying to deal with this.   Patient was recently hospitalized for Kidney infection and dehydration.   Pt has been living with daughter for 1.5 months.    Recent potassium level high at 6.1. Needs to have potassium repeated.   Patient has home nursing care that is supposed to do lab draws after being in the hospital. Home health nurse unable to draw labs today at visit and they are looking to have labs faxed to [facility]. While on phone it was documented these were faxed at 4:32pm.      Daughter is trying to get patient to drink, however this has proved difficult.  Only drinking about 30oz per day, per daughter not enough to flush the potassium out of system.     Daughter is also trying to get orders for IV hydration.   Pt is seen by home health nurse.     If home health recommends and requests IV hydration is this something that can be completed without an appointment in clinic?  Can patient receive IV fluids through home health so they do not have to go into an infusion center?    Daughter was also looking for clarification on appointments needed with MD with having home health nurse?  Can you please advise on what you would recommend for follow up with MD in clinic with having home health nurse coming to see patient?     RN will route to Dr. Radford for follow up and recommendations on home health IV hydration and clarification on recommended appointments with MD with home health.   Please advise.   Buffy Whitley RN   [facility] RN Triage        Reason for Disposition    [1] Follow-up call to recent contact AND [2] information only call, no triage required    Protocols used: INFORMATION ONLY CALL-A-

## 2021-05-30 NOTE — TELEPHONE ENCOUNTER
INR result is   INR   Date Value Ref Range Status   06/27/2019 2.60 (!) 0.9 - 1.1 Final     7/03/19                    2.1     Will the patient be seen, or did they already see, MD or CNP today? No    Most Recent Warfarin dose day/week  Sunday Monday Tuesday Wednesday Thursday Friday Saturday       3.75 mg 2.5 mg 3.75 mg     Sunday Monday Tuesday Wednesday Thursday Friday Saturday   2.5 mg 3.75 mg 2.5 mg 2.5 mg          Has the patient missed any doses of Coumadin, Warfarin, Jantoven in the past 7 days? No    Has the patients medications changed since the last visit? No    Has the patient experienced any bleeding recently? No    Has the patient experienced any injuries or illness recently? No    Has the patient experienced any 'new' shortness of breath, severe headaches, or changes in vision recently? No    Has the patient had any changes in their diet, or alcohol consumption? No    Is the patient here today to prepare for any type of upcoming surgery, procedure, or for a cardioversion procedure? No    What phone number can we reach the patient at today? José Manuel [facility] HomeCare nurse  [phone].

## 2021-05-30 NOTE — TELEPHONE ENCOUNTER
INR result is 2.6  INR   Date Value Ref Range Status   06/20/2019 2.20 (!) 0.9 - 1.1 Final       Will the patient be seen, or did they already see, MD or CNP today? No    Most Recent Warfarin dose day/week   Sunday Monday Tuesday Wednesday Thursday Friday Saturday       3.75 mg 2.5 mg 3.75 mg     Sunday Monday Tuesday Wednesday Thursday Friday Saturday   2.5 mg 3.75 mg 2.5 mg 2.5 mg          Has the patient missed any doses of Coumadin, Warfarin, Jantoven in the past 7 days? No     Has the patients medications changed since the last visit? No    Has the patient experienced any bleeding recently? No    Has the patient experienced any injuries or illness recently? Yes fell at home 6/25 was on floor for 3 hours Hitting head and rear, bruised sacrum ER visit was accomplished, no bleeding issue. Elevated Triponin ECHO performed no result.     Has the patient experienced any 'new' shortness of breath, severe headaches, or changes in vision recently? No    Has the patient had any changes in their diet, or alcohol consumption? No    Is the patient here today to prepare for any type of upcoming surgery, procedure, or for a cardioversion procedure? No    What phone number can we reach the patient at today? [phone] [facility]

## 2021-05-30 NOTE — TELEPHONE ENCOUNTER
Left message for Francisca [facility] for approved verbal orders below per Dr Radford.

## 2021-05-30 NOTE — TELEPHONE ENCOUNTER
Who is calling:  Dee Dee, patient's daughter  Reason for Call:  She is looking for lab orders for potassium.  Orders are in the chart.  Dee Dee would like to take patient to a clinic in [place].  Dee Dee will check with insurance for coverage at that clinic.  If she can go there, please fax lab orders dated today for Basic Metabolic Panel and HM2.  If not, patient should be seen at a [facility] facility.  Date of last appointment with primary care: 6/7/19  Okay to leave a detailed message: n/a

## 2021-05-30 NOTE — TELEPHONE ENCOUNTER
Orders being requested:   Physical Therapy  1x a week for 1 week  2x a week for 3 weeks  Reason service is needed/diagnosis:   Exercise  Gait stability  When are orders needed by: Asap  Where to send Orders: Phone:  Verbal orders to Cass [facility], [phone]  Okay to leave detailed message?  Yes

## 2021-05-30 NOTE — PROGRESS NOTES
Hospital discharge follow up call to pt, no answer.  RN unable to make second call before today's appt.

## 2021-05-30 NOTE — TELEPHONE ENCOUNTER
Orders being requested:   - Skilled Nursing   1 times per week for 4 weeks  3 visits as needed.    - Resume Physical Therapy  - Resume Occupational Therapy    Reason service is needed/diagnosis: Hyperkalemia  When are orders needed by: as soon as possible   Where to send Orders: Phone:  [phone]  Okay to leave detailed message?  Yes

## 2021-05-30 NOTE — TELEPHONE ENCOUNTER
Left detailed message for Francisca giving verbal okay for orders. Advised Francisca to call back with further questions.

## 2021-05-30 NOTE — PATIENT INSTRUCTIONS - HE
1.  Stop Megace.    2.  Marinol 2.5 mg twice daily before meals    3.  Recheck INR, hemoglobin and BMP in one weekly    4.  See in one month

## 2021-05-30 NOTE — TELEPHONE ENCOUNTER
ANTICOAGULATION  MANAGEMENT- Home Care/Care Facility Result    Assessment     Today's INR result of 1.5 is Subtherapeutic (goal INR of 2.0-3.0)        Warfarin taken as previously instructed    No new diet changes affecting INR    No new medication/supplements affecting INR    Continues to tolerate warfarin with no reported s/s of bleeding or thromboembolism     Previous INR was Subtherapeutic    Plan:     Spoke with home care nurse Liberty discussed INR result and instructed:     Warfarin Dosing Instructions: Change warfarin dose to 2.5 mg daily on Sun, Wed; and 3.75 mg daily rest of week  (5 % change from what pt took the past 7 days)    Next INR to be drawn: 1 week.    Education provided: importance of taking warfarin as instructed    Liberty verbalizes understanding and agrees to warfarin dosing plan.   ?   Dilan Robles RN    Subjective/Objective:      Graciela Hopkins, a 89 y.o. female is established on warfarin.     Home care/care facility RN's report of Graciela INR, recent warfarin dosing, diet changes, medication changes, and symptoms is documented below.    Additional findings: template incorrect; verbally confirmed home dose with Liberty and updated on anticoagulation calendar    Anticoagulation Episode Summary     Current INR goal:   2.0-3.0   TTR:   45.7 % (7.2 mo)   Next INR check:   7/26/2019   INR from last check:   1.50! (7/19/2019)   Weekly max warfarin dose:      Target end date:   Indefinite   INR check location:      Preferred lab:      Send INR reminders to:   [facility]    Indications    Acute venous embolism and thrombosis of deep vessels of distal end of left lower extremity (H) [I82.4Z2]           Comments:   1.8-2.5 due to anemia         Anticoagulation Care Providers     Provider Role Specialty Phone number    Car Radford MD Referring Internal Medicine [phone]

## 2021-05-30 NOTE — TELEPHONE ENCOUNTER
ANTICOAGULATION  MANAGEMENT    Assessment     Today's INR result of 1.4 is Subtherapeutic (goal INR of 2.0-3.0)        Warfarin taken as previously instructed  One dose missed in hospital over the weekend    No new diet changes affecting INR    No new medication/supplements affecting INR    Continues to tolerate warfarin with no reported s/s of bleeding or thromboembolism transfusion with hospital stay for acute on chronic anemia    Previous INR was Subtherapeutic    Plan:     Spoke with daughter Dee Dee regarding INR result and instructed:     Warfarin Dosing Instructions:  Continue current warfarin dose 3.75 mg daily on mon/thu/sat; and 2.5 mg daily rest of week  (0 % change)    Instructed patient to follow up no later than: fri with  home care    Dee Dee verbalizes understanding and agrees to warfarin dosing plan.    Instructed to call the [facility] for any changes, questions or concerns. (#[phone])   ?   Caro Branham RN    Subjective/Objective:      Graciela ALEN Hopkins, a 89 y.o. female is on warfarin.     Graciela reports:     Home warfarin dose: as updated on anticoagulation calendar per template     Missed doses: No     Medication changes:  No     S/S of bleeding or thromboembolism:  No     New Injury or illness:  No     Changes in diet or alcohol consumption:  No     Upcoming surgery, procedure or cardioversion:  No    Anticoagulation Episode Summary     Current INR goal:   2.0-3.0   TTR:   48.0 % (6.9 mo)   Next INR check:   7/12/2019   INR from last check:   1.40! (7/8/2019)   Weekly max warfarin dose:      Target end date:   Indefinite   INR check location:      Preferred lab:      Send INR reminders to:   [facility]    Indications    Acute venous embolism and thrombosis of deep vessels of distal end of left lower extremity (H) [I82.4Z2]           Comments:   1.8-2.5 due to anemia         Anticoagulation Care Providers     Provider Role Specialty Phone number    Car Radford MD Referring  Internal Medicine [phone]

## 2021-05-30 NOTE — TELEPHONE ENCOUNTER
Spoke with Ellen, who is covering for Nicolle, and relayed PCP message to pt. Ellen understanding, and will let their pharmacist know and will start infusions for pt.

## 2021-05-30 NOTE — TELEPHONE ENCOUNTER
Orders being requested: Physical Therapy twice a week for two weeks, and once a week for one week.  Reason service is needed/diagnosis: Gait, Transfer Training,Home Exercise Program, and Fall Prevention.  When are orders needed by: As Able  Where to send Orders: Please give verbal orders to Carin at: [phone].  Okay to leave detailed message?  Yes

## 2021-05-30 NOTE — TELEPHONE ENCOUNTER
Spoke with pt's daughter, who stated pt just started home care yesterday.  Doesn't believe pt needs to go ER.  States pt needs IV fluids.  Would pt quality for in home infusion for fluids?

## 2021-05-30 NOTE — TELEPHONE ENCOUNTER
Who is calling:  [facility] nurse  Reason for Call:  Caller needs to know if Car Radford MD wants to proceed with the hospital discharge IV order for Venofer or is he choosing a different Rx. Caller stated the patient's daughter informed her that she was told by Car Radford MD that he was not sure. Please call them back with an answer.  Date of last appointment with primary care: 7/8/19  Okay to leave a detailed message: Yes  [phone]

## 2021-05-30 NOTE — TELEPHONE ENCOUNTER
ANTICOAGULATION  MANAGEMENT- Home Care/Care Facility Result    Assessment     Today's INR result of 1.6 is Subtherapeutic (goal INR of 2.0-3.0)        Warfarin taken as previously instructed    drank pediasure 3 days this past week but will stop drinking for now may be affecting diet and INR     Discussed that warfarin dose can be increased if patient continues to drink pediasure supplement.    Interaction between pediasure and warfarin may be affecting INR    Continues to tolerate warfarin with no reported s/s of bleeding or thromboembolism     Previous INR was Subtherapeutic    Plan:     Spoke with [facility] discussed INR result and instructed:     Warfarin Dosing Instructions: take one time booster dose of 5 mg today then continue current warfarin dose    2.5 mg every Sun, Wed; 3.75 mg all other days      (0 % change)    Next INR to be drawn: one week.    Education provided: importance of consistent vitamin K intake, impact of vitamin K foods on INR, importance of therapeutic range and target INR goal and significance of current INR result    José Manuel verbalizes understanding and agrees to warfarin dosing plan.   ?   Ellen Bailey RN    Subjective/Objective:      Graciela Hopkins, a 89 y.o. female is established on warfarin.     Home care/care facility RN's report of Graciela INR, recent warfarin dosing, diet changes, medication changes, and symptoms is documented below.    Additional findings: diet change: drank pediasure nutritional supplement 3 days this past week.    Anticoagulation Episode Summary     Current INR goal:   2.0-3.0   TTR:   44.2 % (7.5 mo)   Next INR check:   8/2/2019   INR from last check:   1.60! (7/26/2019)   Weekly max warfarin dose:      Target end date:   Indefinite   INR check location:      Preferred lab:      Send INR reminders to:   [facility]    Indications    Acute venous embolism and thrombosis of deep vessels of distal end of left lower extremity (H)  [I82.4Z2]           Comments:   1.8-2.5 due to anemia         Anticoagulation Care Providers     Provider Role Specialty Phone number    Car Radford MD Referring Internal Medicine [phone]

## 2021-05-30 NOTE — TELEPHONE ENCOUNTER
Who is calling:  [facility] nurse  Reason for Call:  José Manuel is calling to find out when the patient needs to have her next INR done. Please advise.   Date of last appointment with primary care: n/a  Okay to leave a detailed message: Yes

## 2021-05-30 NOTE — TELEPHONE ENCOUNTER
[facility]na[facility] home care nurse calling with critical lab value: K+ 6.1 drawn today 5 PM today, order by Dr Hannon [facility]  On call provider was paged, Dr Radford states Pt has to do Basic metabolic panel tomorrow morning, home care nurse is notified, no other concern at this time.      Anthony Johns RN, [facility] Triage/Med Refill 6/27/2019 8:44 PM

## 2021-05-30 NOTE — TELEPHONE ENCOUNTER
She might qualify for home IV fluids.  However, I have not seen her since her recent hospital stay.  This makes it difficult for me to responsibly prescribe this.  The home health nurse can assess this and report back.

## 2021-05-30 NOTE — TELEPHONE ENCOUNTER
ANTICOAGULATION  MANAGEMENT- Home Care/Care Facility Result    Assessment     Today's INR result of 2.6 is Therapeutic (goal INR of 2.0-3.0)        Warfarin taken as previously instructed    No new diet changes affecting INR    No new medication/supplements affecting INR     Fell on 6/25. Seen in ED. Has bruised Sacrum    Previous INR was Therapeutic    Plan:     Spoke with Francisca (home care nurse) discussed INR result and instructed:     Warfarin Dosing Instructions: Continue current warfarin dose 3.75 mg daily on Mon/Thurs/Sat; and 2.5 mg daily rest of week  (0 % change)    Next INR to be drawn: one week as daughter is nervous that patient's INR will be supra therapeutic    Education provided: importance of therapeutic range and target INR goal and significance of current INR result    Francisca verbalizes understanding and agrees to warfarin dosing plan.   ?   Nupur Rice RN    Subjective/Objective:      Graciela Hopkins, a 89 y.o. female is established on warfarin.     Home care/care facility RN's report of Graciela INR, recent warfarin dosing, diet changes, medication changes, and symptoms is documented below.    Additional findings: none    Anticoagulation Episode Summary     Current INR goal:   2.0-3.0   TTR:   46.1 % (6.5 mo)   Next INR check:   7/3/2019   INR from last check:   2.60 (6/27/2019)   Weekly max warfarin dose:      Target end date:   Indefinite   INR check location:      Preferred lab:      Send INR reminders to:   [facility]    Indications    Acute venous embolism and thrombosis of deep vessels of distal end of left lower extremity (H) [I82.4Z2]           Comments:   1.8-2.5 due to anemia         Anticoagulation Care Providers     Provider Role Specialty Phone number    Car Radford MD Referring Internal Medicine [phone]

## 2021-05-30 NOTE — TELEPHONE ENCOUNTER
We will wait on venofer pending lab results.  My understanding is that these infusions could not be done at home.

## 2021-05-30 NOTE — TELEPHONE ENCOUNTER
Orders being requested: Lab draw order for home care  Francisca is requesting new lab draw order for BMP for upcoming home care visit 07/03/19.  Reason service is needed/diagnosis: patient's daughter is requesting the draw for BMP   Last OFV 06/07/19  When are orders needed by: for home care visit 07/03/19  Where to send Orders: phone: [phone] ok to leave a message as well.   Okay to leave detailed message?  Yes

## 2021-05-31 VITALS — WEIGHT: 143.6 LBS | BODY MASS INDEX: 27.11 KG/M2 | HEIGHT: 61 IN

## 2021-05-31 VITALS — BODY MASS INDEX: 27.3 KG/M2 | HEIGHT: 61 IN | WEIGHT: 144.6 LBS

## 2021-05-31 VITALS — WEIGHT: 143 LBS | BODY MASS INDEX: 27.47 KG/M2

## 2021-05-31 NOTE — TELEPHONE ENCOUNTER
ANTICOAGULATION  MANAGEMENT PROGRAM    Graciela Hopkins is being discharged from the [facility]tico[facility] Management Program ([facility]).    Reason for discharge:     ACM referral closed, anticoagulation episode resolved and INR standing order discontinued.         Caro Branham RN

## 2021-05-31 NOTE — PROGRESS NOTES
"ASSESSMENT:  1. Malignant neoplasm of breast in female, estrogen receptor positive, unspecified laterality, unspecified site of breast (H)  Remains on Arimidex.  Refill was authorized  - anastrozole (ARIMIDEX) 1 mg tablet; Take 1 tablet (1 mg total) by mouth daily.  Dispense: 90 tablet; Refill: 0    2. Hypercholesterolemia  She is on simvastatin 40 mg daily.  This is continued due to a history of peripheral vascular disease  - simvastatin (ZOCOR) 40 MG tablet; Take 1 tablet (40 mg total) by mouth at bedtime.  Dispense: 90 tablet; Refill: 3    3. Gastroesophageal reflux disease without esophagitis  She has a history of \"watermelon stomach \"and chronic GI blood loss.  She should stay on an acid suppressing drug indefinitely  - omeprazole (PRILOSEC) 20 MG capsule; Take 1 capsule (20 mg total) by mouth daily before breakfast.  Dispense: 90 capsule; Refill: 3    4. Situational depression  He has been on mirtazapine 15 mg nightly since developing situational depression after the death of her  earlier this year.  Her daughter reports that she still tends to be angry with poor appetite and loss of interest in usual activities.  After discussion, Lexapro will be added to 10 as a pain  - escitalopram oxalate (LEXAPRO) 10 MG tablet; One half daily for six days, then one daily in AM  Dispense: 90 tablet; Refill: 3    5. Iron deficiency anemia due to chronic blood loss  Hemoglobin is checked due to her history of occult GI blood loss.  She is on chronic anticoagulation with warfarin due to a history of prothrombin gene mutation and recurrent deep venous thrombosis  - HM2(CBC w/o Differential); Future    6. Encounter for therapeutic drug monitoring  Will be checked    7. Stage 3 chronic kidney disease (H)  Renal function will be checked  - Basic Metabolic Panel    8.  Poor nutritional intake with history of malnutrition:  She was given a trial of Marinol at last visit but did not tolerate it.    9. History of DVT (deep " vein thrombosis)  As noted above, she has a history of recurrent deep venous thrombosis and prothrombin gene mutation.  Family wishes to continue warfarin.  She had a rapid recurrence of DVT the last time she went off of it  - INR        PLAN:  Patient Instructions   1.  I will notify you of test results    2.  Start Lexapro 10 mg.  One half daily for six days, then one daily.  Continue Mirtazapine in the PM    3. Assess effect in one month      Orders Placed This Encounter   Procedures     HM2(CBC w/o Differential)     Standing Status:   Future     Number of Occurrences:   1     Standing Expiration Date:   8/5/2020     Medications Discontinued During This Encounter   Medication Reason     dronabinol (MARINOL) 2.5 MG capsule Side effects     anastrozole (ARIMIDEX) 1 mg tablet Reorder     simvastatin (ZOCOR) 40 MG tablet Reorder     omeprazole (PRILOSEC) 20 MG capsule Reorder     Administrations This Visit     denosumab 60 mg (PROLIA 60 mg/ml)     Admin Date  08/06/2019 Action  Given Dose  60 mg Route  Subcutaneous Administered By  Leila Burch CMA              Return in about 4 weeks (around 9/3/2019) for Recheck.      ASSESSED PROBLEMS:  1. Malignant neoplasm of breast in female, estrogen receptor positive, unspecified laterality, unspecified site of breast (H)  anastrozole (ARIMIDEX) 1 mg tablet   2. Hypercholesterolemia  simvastatin (ZOCOR) 40 MG tablet   3. Gastroesophageal reflux disease without esophagitis  omeprazole (PRILOSEC) 20 MG capsule   4. Situational depression  escitalopram oxalate (LEXAPRO) 10 MG tablet   5. Iron deficiency anemia due to chronic blood loss  HM2(CBC w/o Differential)    CANCELED: HM2(CBC w/o Differential)   6. Encounter for therapeutic drug monitoring  CANCELED: Basic Metabolic Panel   7. Stage 3 chronic kidney disease (H)  Basic Metabolic Panel   8. Chronic blood loss anemia  HM2(CBC w/o Differential)   9. Hyperkalemia  CANCELED: Basic Metabolic Panel    CANCELED: Basic Metabolic  Panel   10. Chronic renal insufficiency, stage III (moderate) (H)  CANCELED: Basic Metabolic Panel   11. Anemia due to blood loss, acute  CANCELED: HM2(CBC w/o Differential)   12. History of DVT (deep vein thrombosis)  INR       CHIEF COMPLAINT:  Chief Complaint   Patient presents with     Follow-up     One month     Injections     Prolia       HISTORY OF PRESENT ILLNESS:  Graciela is a 89 y.o. female presenting to the clinic today to follow up on loss of appetite and anxiety/depression. She is accompanied by her daughter.     Loss of Appetite: Her appetite is still terrible, and she feels weak. She tried taking Marinol a few times to help appetite, but she became dizzy, nauseated, and symptoms like being high. She states she feels sick to her stomach any time that someone wants her to eat. Her appetite seems to be mood driven, and she occasionally eats a little better when she is distracted. She had a colonoscopy and endoscopy last fall.     Anxiety/Depression: She has been angry and negative all the time, per her daughter. She does not want to go anywhere or do anything. She is currently taking mirtazapine 15 mg at bedtime. Her daughter notes that she is very nervous and worried about everything. It seems like she is incapacitated by fear. Her daughter states her mood was always good until her  passed away.     GERD: She is taking omeprazole, which seems to work well in controlling her acid reflux.     Hyperlipidemia: She has resumed taking simvastatin 40 mg daily.     REVIEW OF SYSTEMS:   She sleeps well. She may drink three glasses of water per day. She denies chest pain. Her GI symptoms seem to be better with fiber supplementation. She has been coughing a little, especially when she eats. She states she does not choke on her food. All other systems are negative.    PFSH:  This has been a very difficult situation for her daughter.     TOBACCO USE:  Social History     Tobacco Use   Smoking Status Never  Smoker   Smokeless Tobacco Never Used       VITALS:  Vitals:    08/06/19 1049   BP: 100/58   Patient Site: Left Arm   Patient Position: Sitting   Cuff Size: Adult Regular   Pulse: 80   SpO2: 95%   Weight: 112 lb (50.8 kg)   Height: 5' (1.524 m)     Wt Readings from Last 3 Encounters:   08/06/19 112 lb (50.8 kg)   07/08/19 122 lb (55.3 kg)   07/07/19 120 lb (54.4 kg)     Body mass index is 21.87 kg/m .    PHYSICAL EXAM:  Constitutional:   Reveals an alert, elderly woman who has a subdued affect. Does not appear to be in acute distress.  Vitals: per nursing notes.  HEENT: Unremarkable.   Neck:  Supple, no carotid bruits or adenopathy.  Back:  No spine or CVA pain.  Thorax:  No bony deformities.  Lungs: Clear to A&P without rales or wheezes.  Respiratory effort normal.  Cardiac:   Regular rate and rhythm, normal S1, S2, no murmur or gallop.  Abdomen:  Soft, active bowel sounds without bruits, mass, or tenderness.  Extremities:  Trace ankle edema.    Skin:  Clear  Psychiatric:  Memory intact, mood appropriate.    QUALITY MEASURES:  The following are part of a depression follow up plan for the patient:  management of mental health treatment    ADDITIONAL HISTORY SUMMARIZED (2): Additional information provided by her daughter regarding anxiety and depression.   DECISION TO OBTAIN EXTRA INFORMATION (1): None.   RADIOLOGY TESTS (1): None.  LABS (1): Labs from 7/8/2019 reviewed. Labs ordered.   MEDICINE TESTS (1): None.  INDEPENDENT REVIEW (2 each): None.     The visit lasted a total of 21 minutes face to face with the patient. Over 50% of the time was spent counseling and educating the patient about her anxiety/depression and loss of appetite.    I, Tuan Damon, am scribing for and in the presence of, Dr. Radford.    I, Car Radford, personally performed the services described in this documentation, as scribed by Tuan Damon in my presence, and it is both accurate and complete.    Dragon dictation was used for this  note.  Speech recognition errors are a possibility.    MEDICATIONS:  Current Outpatient Medications   Medication Sig Dispense Refill     anastrozole (ARIMIDEX) 1 mg tablet Take 1 tablet (1 mg total) by mouth daily. 90 tablet 0     cholecalciferol, vitamin D3, (VITAMIN D3) 2,000 unit Tab Take 1 tablet (2,000 Units total) by mouth daily. 90 tablet 3     magnesium hydroxide (MILK OF MAG) 400 mg/5 mL Susp suspension Take 30 mL by mouth daily as needed.  0     mirtazapine (REMERON) 15 MG tablet Take 1 tablet (15 mg total) by mouth at bedtime. 90 tablet 2     omeprazole (PRILOSEC) 20 MG capsule Take 1 capsule (20 mg total) by mouth daily before breakfast. 90 capsule 3     simvastatin (ZOCOR) 40 MG tablet Take 1 tablet (40 mg total) by mouth at bedtime. 90 tablet 3     sodium bicarbonate 650 MG tablet Take 1 tablet (650 mg total) by mouth 3 (three) times a day. OTC product 90 tablet 0     vitamins  A,C,E-zinc-copper (PRESERVISION AREDS) [id number] unit-mg-unit cap Take 1 capsule by mouth daily.        warfarin (COUMADIN/JANTOVEN) 2.5 MG tablet Take 3.75mg on Monday,Thursday, Saturday; and 2.5mg on rest of the days of the week  0     escitalopram oxalate (LEXAPRO) 10 MG tablet One half daily for six days, then one daily in AM 90 tablet 3     No current facility-administered medications for this visit.        Total data points: 3

## 2021-05-31 NOTE — PATIENT INSTRUCTIONS - HE
1.  I will notify you of test results    2.  Start Lexapro 10 mg.  One half daily for six days, then one daily.  Continue Mirtazapine in the PM    3. Assess effect in one month

## 2021-05-31 NOTE — TELEPHONE ENCOUNTER
INR result is 2.1  INR   Date Value Ref Range Status   07/26/2019 1.60 (!) 0.9 - 1.1 Final       Will the patient be seen, or did they already see, MD or CNP today? No    Most Recent Warfarin dose day/week  Sunday Monday Tuesday Wednesday Thursday Friday Saturday        5 3.75     Sunday Monday Tuesday Wednesday Thursday Friday Saturday   2.5 3.75 3.75 2.5 3.75         Has the patient missed any doses of Coumadin, Warfarin, Jantoven in the past 7 days? No    Has the patients medications changed since the last visit? No    Has the patient experienced any bleeding recently? No    Has the patient experienced any injuries or illness recently? No    Has the patient experienced any 'new' shortness of breath, severe headaches, or changes in vision recently? No    Has the patient had any changes in their diet, or alcohol consumption? Yes, not taking any supplements    Is the patient here today to prepare for any type of upcoming surgery, procedure, or for a cardioversion procedure? No    What phone number can we reach the patient at today? home phone listed in demographics.

## 2021-05-31 NOTE — TELEPHONE ENCOUNTER
ANTICOAGULATION  MANAGEMENT- Home Care/Care Facility Result    Assessment     Today's INR result of 2.1 is Therapeutic (goal INR of 1.8-2.5 anemia)        Warfarin taken as previously instructed    No new diet changes affecting INR says she hasn't been drinking her boost, may reinstate    No new medication/supplements affecting INR    Continues to tolerate warfarin with no reported s/s of bleeding or thromboembolism     Previous INR was Therapeutic    Plan:     Spoke with home care José Manuel discussed INR result and instructed:     Warfarin Dosing Instructions: Continue current warfarin dose 2.5 mg daily on wed; and 3.75 mg daily rest of week  (5 % change)    Next INR to be drawn: with ov 8/6      José Manuel verbalizes understanding and agrees to warfarin dosing plan.   ?   Caro Branham RN    Subjective/Objective:      Graciela Hopkins, a 89 y.o. female is established on warfarin.     Home care/care facility RN's report of Graciela INR, recent warfarin dosing, diet changes, medication changes, and symptoms is documented below.    Additional findings: none    Anticoagulation Episode Summary     Current INR goal:   2.0-3.0   TTR:   43.5 % (7.7 mo)   Next INR check:   8/6/2019   INR from last check:   2.10 (8/2/2019)   Weekly max warfarin dose:      Target end date:   Indefinite   INR check location:      Preferred lab:      Send INR reminders to:   [facility]    Indications    Acute venous embolism and thrombosis of deep vessels of distal end of left lower extremity (H) [I82.4Z2]           Comments:   1.8-2.5 due to anemia         Anticoagulation Care Providers     Provider Role Specialty Phone number    Car Radford MD Referring Internal Medicine [phone]

## 2021-05-31 NOTE — PROGRESS NOTES
The following steps were completed to comply with the REMS program for Prolia:  1. Ordering provider has previously reviewed information in the Medication Guide and Patient Counseling Chart, including the serious risks of Prolia  and the symptoms of each risk and have been advised  to seek prompt medical attention if they have signs or symptoms of any of the serious risks.  2. Provided each patient a copy of the Medication Guide and Patient Brochure.  See MAR for administration details.   Indication: Prolia  (denosumab) is a prescription medicine used to treat osteoporosis in patients who:   Are at high risk for fracture, meaning patients who have had a fracture related to osteoporosis, or who have multiple risk factors for fracture; Cannot use another osteoporosis medicine or other osteoporosis medicines did not work well.   The timeline for early/late injections would be 4 weeks early and any time after the 6 month wayen. If a patient receives their injection late, then the subsequent injection would be 6 months from the date that they actually received the injection    1.  When was the last injection?  01/22/19    2.  Has insurance for this injection been verified?  Yes    3.  Did you experience any new onset achiness or rashes that lasted for over a month with your previous Prolia injection?   No    4.  Do you have a fever over 101?F or a new deep cough that is unusual for you today? No    5.  Have you started any new medications in the last 6 months that you were told could affect your immune system? These may have been prescribed by oncologist, transplant, rheumatology, or dermatology.   No    6.  In the last 6 months have you have gastric bypass or parathyroid surgery?   No    7.  Do you plan dental work requiring drilling into the bone such as implants/extractions or oral surgery in the next 2-3 months?   No

## 2021-05-31 NOTE — TELEPHONE ENCOUNTER
----- Message from Car Radford MD sent at 8/7/2019 10:46 AM CDT -----  Graciela: Your hemoglobin is acceptable at 10.9.  The potassium is mildly elevated at 5.4.  Kidney function is somewhat reduced compared to usual with a BUN of 33 and a creatinine of 1.81.  Make sure to maintain a good fluid intake.    Car Radford MD

## 2021-05-31 NOTE — TELEPHONE ENCOUNTER
Who is calling:  Patient's daughterDee Dee  Reason for Call:    Patient's daughter states patient is not hydrating well.  She is questioning if Infusion Therapy for fluids would be advisable for a few times, not ongoing.  Patient has been to the ED three times since May for dehydration.  Please reach out to patient's daughter and advise.  Date of last appointment with primary care: 8/6/19  Okay to leave a detailed message: Yes

## 2021-05-31 NOTE — TELEPHONE ENCOUNTER
"Prior to ordering this, we would need to check a basic metabolic panel.  We then could consider IV fluids.  I have the left an order for this to be done as a \"lab only \".  They also may be interested in a home nursing evaluation.  Labs then could be drawn at the home which would be more convenient.  "

## 2021-05-31 NOTE — TELEPHONE ENCOUNTER
ANTICOAGULATION  MANAGEMENT    Assessment     Today's INR result of 2.5 is Therapeutic (goal INR of 1.8-2.5)        Warfarin taken as previously instructed    No new diet changes affecting INR    No new medication/supplements affecting INR    Continues to tolerate warfarin with no reported s/s of bleeding or thromboembolism     Previous INR was Therapeutic    Plan:     Spoke with Dee Dee, daughter, regarding INR result and instructed:     Warfarin Dosing Instructions:  Change warfarin dose to 2.5 mg daily on Wed, Sat; and 3.75 mg daily rest of week  (5 % change)  Per family request as pt has had recent bleeding issues and is at top of her goal range    Instructed patient to follow up no later than: 1 week, ([facility] called and scheduled)    Education provided: target INR goal and significance of current INR result, importance of following up for INR monitoring at instructed interval and importance of taking warfarin as instructed    Dee Dee verbalizes understanding and agrees to warfarin dosing plan.    Instructed to call the [facility] for any changes, questions or concerns. (#[phone])   ?   Venessa Medina RN    Subjective/Objective:      Graciela Hopkins, a 89 y.o. female is on warfarin.     Graciela reports:     Home warfarin dose: verbally confirmed home dose with Dee Dee and updated on anticoagulation calendar     Missed doses: No     Medication changes:  No     S/S of bleeding or thromboembolism:  No     New Injury or illness:  No     Changes in diet or alcohol consumption:  No     Upcoming surgery, procedure or cardioversion:  No    Anticoagulation Episode Summary     Current INR goal:      TTR:   44.6 % (7.8 mo)   Next INR check:   8/13/2019   INR from last check:   2.50 (8/6/2019)   Goal at result date:   2.0-3.0   Weekly max warfarin dose:      Target end date:   Indefinite   INR check location:      Preferred lab:      Send INR reminders to:   [facility]    Indications    Acute venous embolism and  thrombosis of deep vessels of distal end of left lower extremity (H) [I82.4Z2]           Comments:   1.8-2.5 due to anemia         Anticoagulation Care Providers     Provider Role Specialty Phone number    Car Radford MD Referring Internal Medicine [phone]         36.3

## 2021-06-01 VITALS — HEIGHT: 61 IN | BODY MASS INDEX: 26.24 KG/M2 | WEIGHT: 139 LBS

## 2021-06-01 VITALS — HEIGHT: 61 IN | WEIGHT: 139.2 LBS | BODY MASS INDEX: 26.28 KG/M2

## 2021-06-01 VITALS — HEIGHT: 60 IN | BODY MASS INDEX: 27.48 KG/M2 | WEIGHT: 140 LBS

## 2021-06-01 VITALS — WEIGHT: 139.8 LBS | BODY MASS INDEX: 26.85 KG/M2

## 2021-06-01 VITALS — WEIGHT: 136 LBS | BODY MASS INDEX: 26.12 KG/M2

## 2021-06-01 VITALS — BODY MASS INDEX: 26.89 KG/M2 | WEIGHT: 140 LBS

## 2021-06-02 VITALS — BODY MASS INDEX: 22.65 KG/M2 | WEIGHT: 116 LBS

## 2021-06-02 VITALS — WEIGHT: 134 LBS | BODY MASS INDEX: 25.74 KG/M2

## 2021-06-02 VITALS — WEIGHT: 118 LBS | BODY MASS INDEX: 23.05 KG/M2

## 2021-06-02 VITALS — WEIGHT: 139 LBS | BODY MASS INDEX: 27.15 KG/M2

## 2021-06-02 VITALS — BODY MASS INDEX: 26.39 KG/M2 | WEIGHT: 134.4 LBS | HEIGHT: 60 IN

## 2021-06-03 VITALS — WEIGHT: 122 LBS | HEIGHT: 60 IN | BODY MASS INDEX: 23.95 KG/M2

## 2021-06-03 VITALS — BODY MASS INDEX: 21.99 KG/M2 | WEIGHT: 112 LBS | HEIGHT: 60 IN

## 2021-06-08 NOTE — PROGRESS NOTES
ASSESSMENT:  1.  Profound anemia:  Hemoglobin is up slightly to 6.4.  She tolerates the degree of anemia surprisingly well.  Iron studies are consistent with iron deficiency.  Stool heme studies are negative.  She also was started on vitamin B12 since we B-12 level was at the low end of normal.  Since she tolerates the anemia well, I do not see a need for transfusion at the present time.  She will continue the iron supplement with a recheck of hemoglobin next week.  2.  Breast cancer:  She has an appointment with Dr. Tinsley scheduled for next week.  She obviously would need a substantially higher hemoglobin to consider surgical intervention  3.  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  Since the stool Hemoccult studies have been negative, I feel she could restart warfarin at this time.  She would be regarded as high risk for recurrent DVT.  She and her daughter state that she has bridged with Lovenox for procedures in the past where she needed to go off Coumadin.  4.  Healthcare directive:  Her health care directive was discussed and will be filed with her chart  PLAN:  1.  Restart warfarin.  Resume usual dosing schedule without loading  2.  Recheck hemoglobin next week.  Also recheck INR  3.  Clinic follow-up several weeks.  4.  See Dr. Tinsley in surgical consultation next week as planned  Orders Placed This Encounter   Procedures     HM2(CBC w/o Differential)     Medications Discontinued During This Encounter   Medication Reason     omeprazole (PRILOSEC) 20 MG capsule Duplicate order           ASSESSED PROBLEMS:  1. Iron deficiency anemia  HM2(CBC w/o Differential)       CHIEF COMPLAINT:  Chief Complaint   Patient presents with     Follow-up     check HGB       HISTORY OF PRESENT ILLNESS:  Graciela is a 89 y.o. female presenting to the clinic today for follow up for anemia. She is accompanied  "by two daughters. She has had two low hemoglobin results on 12/28/16 and 1/6/16 and is here today for repeat hemoglobin. She is having no problems with her iron supplement. She denies stomach problems or constipation. Fecal occult was negative. She has a history of DVT and has discontinued coumadin until her hemoglobin improves. Her daughters have questions regarding reinitiation of coumadin. Her hemoglobin today is 6.4.     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 She has an appointment with Dr. Tinsley on 1/17/17.     Osteoporosis: Her daughter wonders whether she should begin calcium injections as discussed considering her other health issues presently.      REVIEW OF SYSTEMS:   She continues on B-12 injections. All other systems are negative.    PFSH:    TOBACCO USE:  History   Smoking Status     Never Smoker   Smokeless Tobacco     Not on file       VITALS:  Vitals:    01/12/17 1331   BP: 122/64   Patient Site: Left Arm   Patient Position: Sitting   Cuff Size: Adult Regular   Pulse: 84   Weight: 144 lb 11.2 oz (65.6 kg)   Height: 5' 0.5\" (1.537 m)     Wt Readings from Last 3 Encounters:   01/12/17 144 lb 11.2 oz (65.6 kg)   12/29/16 142 lb 9.6 oz (64.7 kg)   09/29/15 158 lb 3.2 oz (71.8 kg)     Body mass index is 27.79 kg/(m^2).    PHYSICAL EXAM:  Constitutional:   Reveals an alert pleasant elderly woman. Ambulates fairly easily. She is able to get onto exam table with minimal assistance.  Vitals: per nursing notes.  Neck:  Supple, no carotid bruits or adenopathy.  Back: Minor thoracic kyphosis.   Lungs: Clear to A&P.   Cardiac:   Regular rhythm, 2/6 systolic murmur left sternal border.   Abdomen:  Soft, active bowel sounds without bruits, mass, or tenderness.  Extremities:  Trace edema right ankle.     Skin:  Clear.     ADDITIONAL HISTORY SUMMARIZED (2): None.  DECISION TO OBTAIN EXTRA INFORMATION (1): None.   RADIOLOGY TESTS (1): " None.  LABS (1): Labs ordered and reviewed regarding hemoglobin and fecal occult.   MEDICINE TESTS (1): None.  INDEPENDENT REVIEW (2 each): None.     The visit lasted a total of 16 minutes face to face with the patient. Over 50% of the time was spent counseling and educating the patient about anemia.    I, Klaus Ventura, am scribing for and in the presence of, Dr. Radford.    I, Dr. Radford, personally performed the services described in this documentation, as scribed by Klaus Ventura in my presence, and it is both accurate and complete.    Dragon dictation was used for this note.  Speech recognition errors are a possibility.    MEDICATIONS:  Current Outpatient Prescriptions   Medication Sig Dispense Refill     cholecalciferol, vitamin D3, (VITAMIN D3) 2,000 unit Tab Take 1 tablet (2,000 Units total) by mouth daily. 90 tablet 3     COUMADIN 5 mg tablet Take 1/2 to 1 tablets (2.5 to 5 mg) by mouth daily. Adjust dose based on INR results as directed. 90 tablet 1     ferrous sulfate 325 (65 FE) MG tablet Take 1 tablet (325 mg total) by mouth 2 (two) times a day. 60 tablet 3     mirtazapine (REMERON) 7.5 MG tablet Take 1 tablet (7.5 mg total) by mouth bedtime. 30 tablet 11     omeprazole (PRILOSEC) 20 MG capsule Take 1 capsule (20 mg total) by mouth daily. 30 capsule 11     simvastatin (ZOCOR) 40 MG tablet TAKE ONE TABLET BY MOUTH DAILY AT BEDTIME 90 tablet 3     trimethoprim (TRIMPEX) 100 mg tablet Take 100 mg by mouth. ONE TABLET DAILY FOR THE FIRST SEVEN DAYS OF EACH MONTH.       valsartan-hydrochlorothiazide (DIOVAN-HCT) 80-12.5 mg per tablet TAKE ONE TABLET BY MOUTH DAILY 30 tablet 11     vitamins  A,C,E-zinc-copper (PRESERVISION AREDS) 14,320-226-200 unit-mg-unit cap Take 1 capsule by mouth daily.        No current facility-administered medications for this visit.        Total data points: 1

## 2021-06-08 NOTE — PROGRESS NOTES
ASSESSMENT:  1.  Anemia:  Labs are consistent with iron deficiency.  She also was given B12 since the B12 levels at the low end of normal.  Hemoglobin is stable at 6.0.  She only started the iron supplement yesterday.  She surprisingly has only minor symptoms related to the profound anemia.  2.  Left breast cancer:  Pathology reveals an invasive mammary carcinoma with mixed ductal and lobular features estrogen and progesterone receptors are positive.  HER-2: Negative.  She has an appointment scheduled with Dr. Tinsley.  3.  Anticoagulation due to recurrent deep venous thrombosis with prothrombin gene mutation:  Warfarin has been held since last visit due to the profound anemia.  It should be restarted soon since her risks for recurrent clotting are quite significant  4.  Hypertension:  Blood pressure remains acceptable  PLAN:  1.  Check stool Hemoccult  2.  Stay off warfarin for now.  Continue the iron supplement  3.  Clinic follow-up next week.  If she does not have a rapid improvement in hemoglobin, a transfusion would be appropriate.  She also would require a substantially higher hemoglobin prior to consideration of surgery for the breast cancer  4.  Plan was discussed by phone with her daughter    There are no discontinued medications.        ASSESSED PROBLEMS:  No diagnosis found.    CHIEF COMPLAINT:  No chief complaint on file.      HISTORY OF PRESENT ILLNESS:  Graciela is a 89 y.o. female presenting to the clinic today for anemia. At her physical exam on 12/29/16, her hemoglobin was noted to be quite low at 6.2. She presented to the clinic today for repeat hemoglobin which again was low at 6.0. She admits that she has only begun to take prescribed iron supplements yesterday. She has been very fatigued.     GERD: Controlled on Prilosec 20mg daily. She has taken this dosage for years.     Breast Cancer: She has a recent left breast biopsy showing invasive mammary carcinoma with mixed ductal and lobular  features. She will talk to her daughter who is a physician regarding surgery consult and next steps.     Hypertension: She continues on valsartan-hydrochlorothiazide 80-12.5mg daily. BP recheck by MD: 136/60.     REVIEW OF SYSTEMS:   All other systems are negative.    PFSH:  Lives with her  in a seniors residence.  She still is rather unhappy about moving from her home in [place]    TOBACCO USE:  History   Smoking Status     Never Smoker   Smokeless Tobacco     Not on file       VITALS:  There were no vitals filed for this visit.  Wt Readings from Last 3 Encounters:   12/29/16 142 lb 9.6 oz (64.7 kg)   09/29/15 158 lb 3.2 oz (71.8 kg)   07/30/15 161 lb 9.6 oz (73.3 kg)     There is no height or weight on file to calculate BMI.    PHYSICAL EXAM:  Constitutional:   Reveals an alert pleasant elderly woman who ambulates with a cane. Affect appropriate. Does not seem acutely ill. Vitals: per nursing notes.  Back: Mild thoracic kyphosis.   Lungs: Clear to A&P without rales or wheezes.  Respiratory effort normal.  Cardiac:   Regular rrhythm, 2/6 systolic ejection murmur left sternal border to apex.   Abdomen:  Soft.    ADDITIONAL HISTORY SUMMARIZED (2): None.   DECISION TO OBTAIN EXTRA INFORMATION (1): None.   RADIOLOGY TESTS (1): Reviewed biopsy results of 1/3/17.   LABS (1): Today's labs regarding hemoglobin reviewed.   MEDICINE TESTS (1): None.  INDEPENDENT REVIEW (2 each): None.     The visit lasted a total of 12 minutes face to face with the patient. Over 50% of the time was spent counseling and educating the patient about anemia.    IKlaus, am scribing for and in the presence of, Dr. Radford.    IDr. Radford, personally performed the services described in this documentation, as scribed by Klaus Ventura in my presence, and it is both accurate and complete.    Dragon dictation was used for this note.  Speech recognition errors are a possibility.    MEDICATIONS:  Current Outpatient Prescriptions    Medication Sig Dispense Refill     cholecalciferol, vitamin D3, (VITAMIN D3) 2,000 unit Tab Take 1 tablet (2,000 Units total) by mouth daily. 90 tablet 3     COUMADIN 5 mg tablet Take 1/2 to 1 tablets (2.5 to 5 mg) by mouth daily. Adjust dose based on INR results as directed. 90 tablet 1     ferrous sulfate 325 (65 FE) MG tablet Take 1 tablet (325 mg total) by mouth 2 (two) times a day. 60 tablet 3     mirtazapine (REMERON) 7.5 MG tablet Take 1 tablet (7.5 mg total) by mouth bedtime. 30 tablet 11     omeprazole (PRILOSEC) 20 MG capsule Take 20 mg by mouth bedtime.        omeprazole (PRILOSEC) 20 MG capsule Take 1 capsule (20 mg total) by mouth daily. 30 capsule 11     simvastatin (ZOCOR) 40 MG tablet TAKE ONE TABLET BY MOUTH DAILY AT BEDTIME 90 tablet 3     trimethoprim (TRIMPEX) 100 mg tablet Take 100 mg by mouth. ONE TABLET DAILY FOR THE FIRST SEVEN DAYS OF EACH MONTH.       valsartan-hydrochlorothiazide (DIOVAN-HCT) 80-12.5 mg per tablet TAKE ONE TABLET BY MOUTH DAILY 30 tablet 11     vitamins  A,C,E-zinc-copper (PRESERVISION AREDS) 14,320-226-200 unit-mg-unit cap Take 1 capsule by mouth daily.        Current Facility-Administered Medications   Medication Dose Route Frequency Provider Last Rate Last Dose     cyanocobalamin injection 1,000 mcg  1,000 mcg Intramuscular DAILY Car Radford MD   1,000 mcg at 01/05/17 1329       Total data points: 2

## 2021-06-08 NOTE — PROGRESS NOTES
ASSESSMENT:  1.  Iron deficiency anemia:  Presumably due to occult GI blood loss.  Hemoglobin is up to 7.8 after oral iron compared to 6.0 at its hussain.  Iron will be continued.  2.  Anticoagulation due to recurrent deep venous thrombosis with prothrombin gene mutation:  She was taken off of Coumadin when the profound anemia was noted.  She is now starting back on.  She has not had any symptoms to suggest recurrent DVT.  3.  Breast cancer:  Her consult with Dr. Tinsley was discussed.  She is estrogen and progesterone receptor positive.  She was started on Arimidex.  The plan is to monitor the tumor on Arimidex for several months prior to considering any surgical intervention.  Given her age and current anemia, this seems like a very reasonable option  4.  Osteoporosis:  She will be at risk for accelerated decline in bone density while on Arimidex.  She is not a good candidate for an oral bisphosphonate due to history of [place]'s esophagus.  Prolia will be started  PLAN:  1.  Hemoglobin and INR were checked.  The hemoglobin returned higher at 7.8.  2.  Start prolia 60  g subcu every 6 months  3.  Vitamin D 50,000 IU weekly for 3 months, then 2000 IU daily.  Take this indefinitely.  4.  Desired calcium intake is  mg daily between diet and supplement.  She was encouraged to try to achieve much of this through diet since she expresses dislike for large tablets.  5.  Recheck hemoglobin in 2 weeks as a lab only.  Clinic follow-up 1 month.  Orders Placed This Encounter   Procedures     Hemoglobin     JIC RED     There are no discontinued medications.    No Follow-up on file.    ASSESSED PROBLEMS:  1. Anemia  Hemoglobin    JIC RED   2. History of DVT (deep vein thrombosis)  INR   3. Vitamin D deficiency  ergocalciferol (ERGOCALCIFEROL) 50,000 unit capsule   4. Osteoporosis  denosumab 60 mg (PROLIA 60 mg/ml)       CHIEF COMPLAINT:  Chief Complaint   Patient presents with     Follow-up     needs INR and HGB  "      HISTORY OF PRESENT ILLNESS:  Graciela is a 89 y.o. female presenting to the clinic today accompanied by her daughter for follow up on anemia. At her last visit on 1/12/2017 her hemoglobin was up slightly to 6.4. Stool studies have been negative for blood.  She continue iron supplementation.  She denies stomach problems or constipation. She has not been feeling particularly more energized since starting iron supplements.     Breast cancer: She saw Dr. Tinsley on 1/17/2017 who recommended anastrozole 1 mg daily with follow up in 2 months. A needle biopsy was done recently showed an invasive ductal carcinoma that is estrogen receptor positive, progesterone receptor positive and HER-2 negative.    Osteoporosis: She is not sure if she has been taking vitamin D supplements. Her last vitamin D was 16.4 on 12/29/2016. She has not been taking calcium supplements. She will be getting Prolia injections in the near future. DXA from 9/2015 showed a low T-score of -2.8 as measured at the left radius.     REVIEW OF SYSTEMS:   She denies any dyspnea or weakness.   All other systems are negative.    PFSH:  She has not been drinking milk in the past year or many dairy products.   She eats chocolate and drinks coffee.   TOBACCO USE:  History   Smoking Status     Never Smoker   Smokeless Tobacco     Not on file       VITALS:  Vitals:    01/26/17 1112   BP: 118/62   Patient Site: Left Arm   Patient Position: Sitting   Cuff Size: Adult Regular   Pulse: 72   Weight: 144 lb 4.8 oz (65.5 kg)   Height: 5' 0.5\" (1.537 m)     Wt Readings from Last 3 Encounters:   01/26/17 144 lb 4.8 oz (65.5 kg)   01/17/17 145 lb (65.8 kg)   01/12/17 144 lb 11.2 oz (65.6 kg)       PHYSICAL EXAM:  Constitutional:   Reveals an alert, pleasant elderly female who seems mildly forgetful and ambulates fairly easily using a cane.  Does not appear acutely ill. Affect appropriate.   Vitals: per nursing notes.  HEENT: Atraumatic.   Neck:  Supple, no carotid bruits or " adenopathy.  Back:  Mild thoracic kyphosis.  Thorax:  No bony deformities.  Lungs: Clear to A&P without rales or wheezes.  Respiratory effort normal.  Cardiac:   Regular rate and rhythm, I-II/VI systolic murmur at the upper left sternal border.   Abdomen:  Soft, active bowel sounds without bruits, mass, or tenderness.  Extremities:   No peripheral edema, pulses in the feet intact.    Skin:  No jaundice, peripheral cyanosis or lesions to suggest malignancy.  Neuro:  Alert and oriented. Cranial nerves, motor, sensory exams are intact.  No gross focal deficits.  Psychiatric:  Memory intact, mood appropriate.    ADDITIONAL HISTORY SUMMARIZED (2): Notes from Dr. Tinsley reviewed from 1/17/2017 regarding breast cancer.  DECISION TO OBTAIN EXTRA INFORMATION (1): None.   RADIOLOGY TESTS (1): DXA reviewed from 9/2015.  LABS (1): Labs ordered.  MEDICINE TESTS (1): None.  INDEPENDENT REVIEW (2 each): None.     The visit lasted a total of 17 minutes face to face with the patient. Over 50% of the time was spent counseling and educating the patient about anemia.    IShaquille, am scribing for and in the presence of, Dr. Radford.    I, Dr. Radford, personally performed the services described in this documentation, as scribed by Shaquille Khan in my presence, and it is both accurate and complete.    Dragon dictation was used for this note.  Speech recognition errors are a possibility.    MEDICATIONS:  Current Outpatient Prescriptions   Medication Sig Dispense Refill     anastrozole (ARIMIDEX) 1 mg tablet Take 1 tablet (1 mg total) by mouth daily. 30 tablet 6     cholecalciferol, vitamin D3, (VITAMIN D3) 2,000 unit Tab Take 1 tablet (2,000 Units total) by mouth daily. 90 tablet 3     COUMADIN 5 mg tablet Take 1/2 to 1 tablets (2.5 to 5 mg) by mouth daily. Adjust dose based on INR results as directed. 90 tablet 1     ferrous sulfate 325 (65 FE) MG tablet Take 1 tablet (325 mg total) by mouth 2 (two) times a day. 60 tablet 3      mirtazapine (REMERON) 7.5 MG tablet Take 1 tablet (7.5 mg total) by mouth bedtime. 30 tablet 11     omeprazole (PRILOSEC) 20 MG capsule Take 1 capsule (20 mg total) by mouth daily. 30 capsule 11     simvastatin (ZOCOR) 40 MG tablet TAKE ONE TABLET BY MOUTH DAILY AT BEDTIME 90 tablet 3     trimethoprim (TRIMPEX) 100 mg tablet Take 100 mg by mouth. ONE TABLET DAILY FOR THE FIRST SEVEN DAYS OF EACH MONTH.       valsartan-hydrochlorothiazide (DIOVAN-HCT) 80-12.5 mg per tablet TAKE ONE TABLET BY MOUTH DAILY 30 tablet 11     vitamins  A,C,E-zinc-copper (PRESERVISION AREDS) 14,320-226-200 unit-mg-unit cap Take 1 capsule by mouth daily.        ergocalciferol (ERGOCALCIFEROL) 50,000 unit capsule Take 1 capsule (50,000 Units total) by mouth once a week for 12 doses. 12 capsule 0     Current Facility-Administered Medications   Medication Dose Route Frequency Provider Last Rate Last Dose     denosumab 60 mg (PROLIA 60 mg/ml)  60 mg Subcutaneous Once Car Radford MD           Total data points: 4.

## 2021-06-08 NOTE — PROGRESS NOTES
This is a 89 y.o.  female who I'm asked to see by Car Radford MD for evaluation of a left breast cancer.  This was picked up by the patient.  A mammogram and ultrasound were done.  A needle biopsy was done which shows an invasive ductal carcinoma.  It is estrogen receptor positive, progesterone receptor positive and HER-2 negative.    She has no siginificant family history of breast cancer.      Please see the chart review for PMH, med list, allergies and social history.  Patient Active Problem List   Diagnosis     Anemia     Macular Degeneration     Chronic Reflux Esophagitis     Osteoporosis     Prothrombin Gene Mutation     Methylenetetrahydrofolate Reductase Deficiency     [place]'s esophagus     Bladder Cancer     Hyperlipidemia     Essential Hypertension     Chronic Renal Insufficiency     Thrombophlebitis Of Deep Vessels Of The Lower Extremity     Hemangioma     Peripheral Vascular Disease     Osteoarthritis     Atherosclerosis     History of DVT (deep vein thrombosis)     Dizziness     Iron deficiency     Polymyalgia rheumatica     Vitamin B12 deficiency       Current Outpatient Prescriptions:      cholecalciferol, vitamin D3, (VITAMIN D3) 2,000 unit Tab, Take 1 tablet (2,000 Units total) by mouth daily., Disp: 90 tablet, Rfl: 3     COUMADIN 5 mg tablet, Take 1/2 to 1 tablets (2.5 to 5 mg) by mouth daily. Adjust dose based on INR results as directed., Disp: 90 tablet, Rfl: 1     ferrous sulfate 325 (65 FE) MG tablet, Take 1 tablet (325 mg total) by mouth 2 (two) times a day., Disp: 60 tablet, Rfl: 3     mirtazapine (REMERON) 7.5 MG tablet, Take 1 tablet (7.5 mg total) by mouth bedtime., Disp: 30 tablet, Rfl: 11     omeprazole (PRILOSEC) 20 MG capsule, Take 1 capsule (20 mg total) by mouth daily., Disp: 30 capsule, Rfl: 11     simvastatin (ZOCOR) 40 MG tablet, TAKE ONE TABLET BY MOUTH DAILY AT BEDTIME, Disp: 90 tablet, Rfl: 3     trimethoprim (TRIMPEX) 100 mg tablet, Take 100 mg by mouth. ONE TABLET DAILY  FOR THE FIRST SEVEN DAYS OF EACH MONTH., Disp: , Rfl:      valsartan-hydrochlorothiazide (DIOVAN-HCT) 80-12.5 mg per tablet, TAKE ONE TABLET BY MOUTH DAILY, Disp: 30 tablet, Rfl: 11     vitamins  A,C,E-zinc-copper (PRESERVISION AREDS) 14,320-226-200 unit-mg-unit cap, Take 1 capsule by mouth daily. , Disp: , Rfl:     Allergies   Allergen Reactions     Amlodipine      Edema       Lansoprazole Diarrhea     dairrhea       Nitrofurantoin Macrocrystalline Nausea Only     nausea,chills,anorexia       Rabeprazole Rash     rash       Shellfish Containing Products          ROS:  A 12 point comprehensive review of systems was negative except as noted.    Physical Exam  Visit Vitals     /84     Pulse 95     Wt 145 lb (65.8 kg)     SpO2 98%     BMI 27.85 kg/m2     General:alert, appears stated age and cooperative, she is a little bit unsteady getting up on the bench but is very mentally alert  Lungs:clear to auscultation bilaterally  CV: Regular with a soft grade 2 systolic murmur.  Breasts:Clearly palpable mass in the medial left breast.  Measures about 2 cm in size.  It is close to but not fixed to the chest wall.  No other palpable masses.  Lymph Nodes:no axillary nodes palpated  Neuro:Grossly normal      Reviewed her mammograms and ultrasound and pathology.   Lab Results   Component Value Date    WBC 4.5 01/12/2017    HGB 6.4 (LL) 01/12/2017    HCT 20.0 (L) 01/12/2017    MCV 89 01/12/2017     01/12/2017         Impression: Left Breast Cancer.  Clinically T2, N0.  Discussed the typical surgical options for treatment of breast cancer which generally are a lumpectomy (partial mastectomy) combined with radiation versus a mastectomy.  Explained that the survival benefit is the same for both.  The difference is in local recurrence risk.  The patient is a Good candidate for a lumpectomy.  However, I'm very concerned about her anemia and the fact that she is on Coumadin.  I did discuss with her and her daughters that  another option for her would be simply to go on hormone therapy.  Explained that many patients respond so well to hormone therapy that they don't need chemotherapy.  Right now unless we transfuse her, she is really not a candidate for surgery anyway so I think a really good option would be to do the hormone therapy while we worked to get her hemoglobin up, and then we see if we can get a good response to that.  If it does come to surgery then I do not think she would need anything other than just a lumpectomy.  She and her family were happy to hear there was an option to doing surgery.  I think she would tolerate surgery given the fact she is walking around very well with a hemoglobin of 6.4.  If we can avoid it with good results then I feel that is a very good option for her.      Plan: We'll start anastrozole 1 mg daily.  We'll plan to see her back in about 2-3 months to assess the response.

## 2021-06-09 NOTE — PROGRESS NOTES
ASSESSMENT:  1.  Iron deficiency anemia:  She has not had GI upset from the iron supplement.  She feels her exercise tolerance is improving.  Hemoglobin today is up to 9.0  2.  Breast cancer:  On Arimidex.  She is scheduled to see Dr. Tinsley next month  3.  Hypercoagulable state with recurrent deep venous thrombosis:  She is back on warfarin  PLAN:  1.  Hemoglobin and iron levels were checked  2.  Recheck hemoglobin as a lab only appointment in one month.  Clinic follow-up 2 months  3.  See Dr. Tinsley as planned for follow-up of breast cancer  4.  Stop vitamin B12 injections since she does not have clear evidence of vitamin B12 deficiency  No orders of the defined types were placed in this encounter.    Medications Discontinued During This Encounter   Medication Reason     MIRTAZAPINE (REMERON ORAL) Therapy completed       No Follow-up on file.    ASSESSED PROBLEMS:  No diagnosis found.    CHIEF COMPLAINT:  Chief Complaint   Patient presents with     Follow-up       HISTORY OF PRESENT ILLNESS:  Graciela is a 89 y.o. female presenting to the clinic today accompanied by her daughter for follow up on anemia.  Hemoglobin is at 9.0 today after oral iron supplementation. At its lowest, iron was at 6.0. She started Warfarin last month and has not noted any increase in bruising. She denies much dyspnea with movement but states that she does not walk too often. Her daughter has noticed that she is able to walk faster lately.      Breast cancer: She is followed by Dr. Tinsley for breast cancer. She is estrogen and progesterone receptor positive and was recently started on Arimidex.     REVIEW OF SYSTEMS:   She denies any SOB or stomach upset.   All other systems are negative.    PFSH:  Reviewed, as above.     TOBACCO USE:  History   Smoking Status     Never Smoker   Smokeless Tobacco     Not on file       VITALS:  Vitals:    02/23/17 1202   BP: 132/56   Patient Site: Left Arm   Patient Position: Sitting   Cuff Size: Adult Regular  "  Pulse: 88   Weight: 137 lb 14.4 oz (62.6 kg)   Height: 5' 0.5\" (1.537 m)     Wt Readings from Last 3 Encounters:   02/23/17 137 lb 14.4 oz (62.6 kg)   01/26/17 144 lb 4.8 oz (65.5 kg)   01/17/17 145 lb (65.8 kg)       PHYSICAL EXAM:  Constitutional:   Reveals an alert, pleasant elderly female who ambulates easily using a single pronged cane.  Does not appear acutely ill. Affect appropriate  Vitals: per nursing notes.  HEENT:  Ears:  External canals, TMs clear.    Eyes:  EOMs full, PERRL.  Back:  No spine or CVA pain.  Lungs: Clear to A&P without rales or wheezes.  Respiratory effort normal.  Cardiac:   Regular rate and rhythm with occasional irregular beats, II/VI systolic sternal border.   Abdomen:  Soft, active bowel sounds without bruits, mass, or tenderness.  Extremities:   No peripheral edema, pulses in the feet intact.    Skin:  Pale but clear.   Neuro:  Alert and oriented. Cranial nerves, motor, sensory exams are intact.  No gross focal deficits.  Psychiatric:  Memory intact, mood appropriate.    QUALITY MEASURES:  The following high BMI interventions were performed this visit: weight monitoring    ADDITIONAL HISTORY SUMMARIZED (2): Notes from Dr. Tinsley reviewed from 1/17/2017 regarding breast cancer.  DECISION TO OBTAIN EXTRA INFORMATION (1): None.   RADIOLOGY TESTS (1): None.  LABS (1): Labs ordered.  MEDICINE TESTS (1): None.  INDEPENDENT REVIEW (2 each): None.     The visit lasted a total of 17 minutes face to face with the patient. Over 50% of the time was spent counseling and educating the patient about anemia.    I, Shaquille Khan, am scribing for and in the presence of, Dr. Radford.    I, Dr. Radford, personally performed the services described in this documentation, as scribed by Shaquille Khna in my presence, and it is both accurate and complete.    Dragon dictation was used for this note.  Speech recognition errors are a possibility.    MEDICATIONS:  Current Outpatient Prescriptions   Medication " Sig Dispense Refill     anastrozole (ARIMIDEX) 1 mg tablet Take 1 mg by mouth daily.       cholecalciferol, vitamin D3, (VITAMIN D3) 2,000 unit Tab Take 1 tablet (2,000 Units total) by mouth daily. 90 tablet 3     COUMADIN 5 mg tablet Take 1/2 to 1 tablets (2.5 to 5 mg) by mouth daily. Adjust dose based on INR results as directed. 90 tablet 1     ergocalciferol (ERGOCALCIFEROL) 50,000 unit capsule Take 1 capsule (50,000 Units total) by mouth once a week for 12 doses. 12 capsule 0     ferrous sulfate 325 (65 FE) MG tablet Take 1 tablet (325 mg total) by mouth 2 (two) times a day. 60 tablet 3     mirtazapine (REMERON) 7.5 MG tablet Take 7.5 mg by mouth bedtime.       omeprazole (PRILOSEC) 20 MG capsule Take 1 capsule (20 mg total) by mouth daily. 30 capsule 11     simvastatin (ZOCOR) 40 MG tablet TAKE ONE TABLET BY MOUTH DAILY AT BEDTIME 90 tablet 3     trimethoprim (TRIMPEX) 100 mg tablet Take 100 mg by mouth. ONE TABLET DAILY FOR THE FIRST SEVEN DAYS OF EACH MONTH.       valsartan-hydrochlorothiazide (DIOVAN-HCT) 80-12.5 mg per tablet TAKE ONE TABLET BY MOUTH DAILY 30 tablet 11     vitamins  A,C,E-zinc-copper (PRESERVISION AREDS) 14,320-226-200 unit-mg-unit cap Take 1 capsule by mouth daily.        No current facility-administered medications for this visit.        Total data points: 3.

## 2021-06-09 NOTE — PROGRESS NOTES
Graciela comes in today for follow-up of her left breast cancer that we opted to treat conservatively with just anastrozole.  She is feeling well.  She is having no side effects from the anastrozole.  She does not try to feel the lump so she does not know if it has changed at all.    Physical exam:  Visit Vitals     /67 (Patient Site: Left Arm, Patient Position: Sitting, Cuff Size: Adult Regular)     Pulse 90     Wt 142 lb 12.8 oz (64.8 kg)     SpO2 100%     BMI 27.43 kg/m2     General: Elderly woman in no acute distress.  Breasts: Still palpable mass in the medial aspect of the left breast but does seem smaller.  I used ultrasound today and I measured it today at 1.3 x 8 x 5 mm.    Impression: Left breast cancer that appears to be responding quite well to the anastrozole.  In just a couple months it went from 2.2 to 1.3 cm in greatest dimension.  Given this I think we should just continue with the anastrozole.  I would like to see her back again in 4 months.  As time goes on if it continues to resolve we will stretch the visits out to longer intervals.

## 2021-06-10 NOTE — PROGRESS NOTES
Subjective findings: The patient returned to the clinic today for continued treatment of a painful callus on the third toe right foot.    Objective findings: Nails bilateral feet are normal growth and color. Skin bilateral feet warm and intact.  There is a thick hyperkeratotic nucleated lesion on the distal lateral aspect of the third toe right foot and fifth toe left foot.  DP and PT pulses +14 both feet. Capillary refill less than 2 seconds bilateral feet.  Negative clonus, negative Babinski bilateral feet.  Range of motion within normal limits bilateral feet. Muscle power +3 over 5 bilaterally with an all compartments.     Assessment: Tyloma     Plan: Debrided hyperkeratotic lesions third toe right foot.The patient is to return to clinic as needed for continued care.

## 2021-06-10 NOTE — PROGRESS NOTES
ASSESSMENT:  1.  Iron deficiency anemia: She increased her iron supplement to 3 tabs daily.  She is tolerating that although she would prefer a lower dose if possible.  Previous stool Hemoccults were negative when not on warfarin.  Still suspect that low-grade occult blood loss is the cause.  She and her daughter prefer to stay on warfarin since she has an underlying clotting disorder with recurrent DVT.  2.  Hypertension: Good control on current regimen  3.  Breast cancer: The note from Dr. Tinsley was reviewed.  The tumor is smaller since beginning Arimidex.  4.  Osteoporosis: On Prolia  PLAN:  1.  Recheck hemogram and iron studies  2.  If hemoglobin declines further, or she cannot tolerate oral iron, IV iron would be advised  3.  Recheck hemoglobin in 1 month.  Clinic follow-up 3 months.  Continue other medications at current doses    Orders Placed This Encounter   Procedures     Ferritin     Iron and Transferrin Iron Binding Capacity     HM2(CBC w/o Differential)     There are no discontinued medications.    No Follow-up on file.    ASSESSED PROBLEMS:  1. Iron deficiency anemia  Ferritin    Iron and Transferrin Iron Binding Capacity    HM2(CBC w/o Differential)       CHIEF COMPLAINT:  Chief Complaint   Patient presents with     Follow-up     review labs and meds       HISTORY OF PRESENT ILLNESS:  Graciela is a 89 y.o. female presenting to the clinic today accompanied by her daughter for follow up on anemia for follow up on anemia. She continues to take an iron supplement and has not had any major side effects from it, though does intermittently have some diarrhea. She states that some days she feels strong and more energetic, but other days her pep is low. She mentions that she occasionally only takes supplementation once a day, but tries to take it three times daily.  Hemoglobin from last check was 8.0 on 4/7/2017. She is not overly interested in trying IV iron infusions.     Breast cancer:  She continues to treat  "breast cancer conservatively with anastrozole. She denies any side effects from the anastrozole. Dr. Tinsley was satisfied that the cancer appeared to be responding quite well to the anastrozole. She reports that she has been feeling well.     REVIEW OF SYSTEMS:   Per her daughter, her mood has been good. She denies any insomnia.   All other systems are negative.    PFSH:  Reviewed, as below.     TOBACCO USE:  History   Smoking Status     Never Smoker   Smokeless Tobacco     Not on file       VITALS:  Vitals:    04/20/17 1157 04/20/17 1231   BP: 112/40 120/56   Patient Site: Left Arm    Patient Position: Sitting    Cuff Size: Adult Regular    Pulse: 100    Weight: 143 lb 14.4 oz (65.3 kg)    Height: 5' 0.5\" (1.537 m)      Wt Readings from Last 3 Encounters:   04/20/17 143 lb 14.4 oz (65.3 kg)   03/17/17 142 lb 12.8 oz (64.8 kg)   02/23/17 137 lb 14.4 oz (62.6 kg)       PHYSICAL EXAM:  Constitutional:   Reveals an alert, pleasant elderly female. Does not appear acutely ill. Affect appropriate. Blood pressure rechecked by MD: 120/56 RUE sitting.   Vitals: per nursing notes.  HEENT:  Atraumatic.   Neck:  Supple, no carotid bruits or adenopathy.  Back:  No spine or CVA pain.  Thorax:  No bony deformities.   Lungs: Clear to A&P without rales or wheezes.  Respiratory effort normal.  Cardiac:   Regular rate and rhythm, normal S1, S2, I-II/VI systolic murmur at the left sternal border   Abdomen:  Soft, active bowel sounds without bruits, mass, or tenderness.  Extremities:   Compressive stockings on, no obvious edema.     Skin:  Slightly pale.  Neuro:  Alert and oriented. Cranial nerves, motor, sensory exams are intact.  No gross focal deficits.  Psychiatric:  Memory intact, mood appropriate.    ADDITIONAL HISTORY SUMMARIZED (2): Notes from Dr. Tinsley reviewed from 3/17/2017 regarding breast cancer.   DECISION TO OBTAIN EXTRA INFORMATION (1): None.   RADIOLOGY TESTS (1): None.  LABS (1): Labs reviewed from 4/7/2017. Labs " ordered.   MEDICINE TESTS (1): None.  INDEPENDENT REVIEW (2 each): None.     The visit lasted a total of 13 minutes face to face with the patient. Over 50% of the time was spent counseling and educating the patient about anemia.    I, Shaquille Khan, am scribing for and in the presence of, Dr. Radford.    I, Dr. Radford, personally performed the services described in this documentation, as scribed by Shaquille Khan in my presence, and it is both accurate and complete.    Dragon dictation was used for this note.  Speech recognition errors are a possibility.    MEDICATIONS:  Current Outpatient Prescriptions   Medication Sig Dispense Refill     ADACEL,TDAP ADOLESN/ADULT,,PF, 2 Lf-(2.5-5-3-5 mcg)-5Lf/0.5 mL injection        anastrozole (ARIMIDEX) 1 mg tablet Take 1 tablet (1 mg total) by mouth daily. 30 tablet 6     cholecalciferol, vitamin D3, (VITAMIN D3) 2,000 unit Tab Take 1 tablet (2,000 Units total) by mouth daily. 90 tablet 3     COUMADIN 5 mg tablet Take 1/2 to 1 tablets (2.5 to 5 mg) by mouth daily. Adjust dose based on INR results as directed. 90 tablet 1     ferrous sulfate 325 (65 FE) MG tablet Take 1 tablet (325 mg total) by mouth 2 (two) times a day. (Patient taking differently: Take 3 tablets by mouth daily with breakfast. ) 60 tablet 3     mirtazapine (REMERON) 7.5 MG tablet Take 7.5 mg by mouth bedtime.       omeprazole (PRILOSEC) 20 MG capsule Take 1 capsule (20 mg total) by mouth daily. 30 capsule 11     simvastatin (ZOCOR) 40 MG tablet TAKE ONE TABLET BY MOUTH DAILY AT BEDTIME 90 tablet 3     trimethoprim (TRIMPEX) 100 mg tablet Take 100 mg by mouth. ONE TABLET DAILY FOR THE FIRST SEVEN DAYS OF EACH MONTH.       valsartan-hydrochlorothiazide (DIOVAN-HCT) 80-12.5 mg per tablet TAKE ONE TABLET BY MOUTH DAILY 30 tablet 11     valsartan-hydrochlorothiazide (DIOVAN-HCT) 80-12.5 mg per tablet TAKE 1 TABLET BY MOUTH DAILY 30 tablet 11     vitamins  A,C,E-zinc-copper (PRESERVISION AREDS) 14,320-550-200  unit-mg-unit cap Take 1 capsule by mouth daily.        No current facility-administered medications for this visit.        Total data points: 3.

## 2021-06-12 NOTE — PROGRESS NOTES
ASSESSMENT:  1.  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  Her most recent hemoglobin was only slightly over 9.  She has a history of prothrombin gene mutation and recurrent deep venous thrombosis, therefore it would seem best to continue Coumadin if possible.  2.  Anticoagulation due to recurrent deep venous thrombosis and prothrombin gene mutation.  3.  Breast cancer: She is followed by Dr. Tinsley.  She was placed on Arimidex rather than pursuing surgery even her advanced age and multiple other issues.  The tumor had shrunk on initial reassessment.  4.  Hypertension: Good control on current regimen  5.  Hyperlipidemia on simvastatin: Last lipids were at goal  6.  History of [place]'s esophagus: She remains on omeprazole and is asymptomatic.  This should be continued indefinitely  7.  Osteoporosis: She is due for Prolia injection today  PLAN:  1.  Check hemogram and ferritin with next INR.  If the ferritin is into the mid normal range her iron dose could be decreased  2.  Prolia injection today.  Repeat in 6 months  3.  Continue other medications at current doses.  Clinic follow-up in 3 months    Medications Discontinued During This Encounter   Medication Reason     valsartan-hydrochlorothiazide (DIOVAN-HCT) 80-12.5 mg per tablet Duplicate order           ASSESSED PROBLEMS:  No diagnosis found.    CHIEF COMPLAINT:  Chief Complaint   Patient presents with     Follow-up     3 Month     Injections     Prolia       HISTORY OF PRESENT ILLNESS:  Graciela is a 89 y.o. female presenting to the clinic today for follow-up of iron deficiency anemia due to occult blood loss, hypertension, and osteoporosis.  She is supposed to be taking iron 3 times daily but frequently forgets it.  She gets in 2 doses daily most days.  Last hemoglobin was slightly over 9.  She is on warfarin due to history of recurrent deep venous  "thrombosis with prothrombin gene mutation.    She is due for Prolia injection today.  She has tolerated this well.    She has been followed by Dr. Tinsley for breast cancer.  It was elected to treat her with Arimidex rather than proceed with surgical resection due to her advanced age and other medical issues.  On initial visit the tumor was felt to be smaller by Dr. Tinsley.    She is on omeprazole due to a history of [place]'s esophagus.  She remains asymptomatic with this.    Other medical issues are stable.  Blood pressure is under good control on her current regimen last lipids were at goal on simvastatin    REVIEW OF SYSTEMS:   All other systems are negative.    PFSH:  Lives independently with her .  Will be celebrating her 89nd birthday in the near future.  She is seen in the clinic with her daughter    TOBACCO USE:  History   Smoking Status     Never Smoker   Smokeless Tobacco     Not on file       VITALS:  Vitals:    07/27/17 1228   BP: 116/70   Patient Site: Left Arm   Patient Position: Sitting   Cuff Size: Adult Regular   Pulse: 66   SpO2: 98%   Weight: 144 lb 9.6 oz (65.6 kg)   Height: 5' 0.5\" (1.537 m)     Wt Readings from Last 3 Encounters:   07/27/17 144 lb 9.6 oz (65.6 kg)   04/20/17 143 lb 14.4 oz (65.3 kg)   03/17/17 142 lb 12.8 oz (64.8 kg)       PHYSICAL EXAM:  Constitutional:   Reveals an alert, pleasant, elderly female who ambulates with a single pronged cane. Can get up without assistance. Does not appear acutely ill. Affect appropriate  Vitals: per nursing notes.  HEENT: Atraumatic.   Neck:  Supple, no carotid bruits or adenopathy.  Back:  Mild thoracic kyphosis   Thorax:   Small cyst midchest   Lungs: Clear to A&P without rales or wheezes.  Respiratory effort normal.  Cardiac:   Regular rate and rhythm, normal S1, S2, II/VI systolic murmur lower left sternal border to apex area.   Breasts:   Not done.   Abdomen:  Soft, active bowel sounds without bruits, mass, or tenderness.  Extremities:  "  Scars over the knees consistent with total knee replacements. Compressive stocking right leg. Moderate varicosities bilaterally. No pitting edema  Skin:  No jaundice, peripheral cyanosis or lesions to suggest malignancy.  Neuro:  Alert and oriented. Cranial nerves, motor, sensory exams are intact.  No gross focal deficits.  Psychiatric:  Memory intact, mood appropriate.    ADDITIONAL HISTORY SUMMARIZED (2): Notes from Dr. Whittington reviewed from 4/20/2017 regarding foot pain.  DECISION TO OBTAIN EXTRA INFORMATION (1): None.   RADIOLOGY TESTS (1): None.  LABS (1): Labs ordered.  MEDICINE TESTS (1): None.  INDEPENDENT REVIEW (2 each): None.     The visit lasted a total of 25 minutes face to face with the patient. Over 50% of the time was spent counseling and educating the patient about iron deficiency anemia.    Dragon dictation was used for this note.  Speech recognition errors are a possibility.    MEDICATIONS:  Current Outpatient Prescriptions   Medication Sig Dispense Refill     anastrozole (ARIMIDEX) 1 mg tablet Take 1 tablet (1 mg total) by mouth daily. 30 tablet 6     cholecalciferol, vitamin D3, (VITAMIN D3) 2,000 unit Tab Take 1 tablet (2,000 Units total) by mouth daily. 90 tablet 3     COUMADIN 5 mg tablet Take 1/2 to 1 tablets (2.5 to 5 mg) by mouth daily. Adjust dose based on INR results as directed. 90 tablet 1     ferrous sulfate 325 (65 FE) MG tablet TAKE 1 TABLET BY MOUTH 2 TIMES A DAY 60 tablet 3     mirtazapine (REMERON) 7.5 MG tablet Take 7.5 mg by mouth bedtime.       omeprazole (PRILOSEC) 20 MG capsule Take 1 capsule (20 mg total) by mouth daily. 30 capsule 11     simvastatin (ZOCOR) 40 MG tablet TAKE ONE TABLET BY MOUTH DAILY AT BEDTIME 90 tablet 1     trimethoprim (TRIMPEX) 100 mg tablet Take 100 mg by mouth. ONE TABLET DAILY FOR THE FIRST SEVEN DAYS OF EACH MONTH.       valsartan-hydrochlorothiazide (DIOVAN-HCT) 80-12.5 mg per tablet TAKE ONE TABLET BY MOUTH DAILY 30 tablet 11     vitamins   A,C,E-zinc-copper (PRESERVISION AREDS) 14,320-226-200 unit-mg-unit cap Take 1 capsule by mouth daily.        ADACEL,TDAP ADOLESN/ADULT,,PF, 2 Lf-(2.5-5-3-5 mcg)-5Lf/0.5 mL injection        No current facility-administered medications for this visit.        Total data points: 3

## 2021-06-12 NOTE — PROGRESS NOTES
Graciela comes in for follow-up of her left breast cancer that we opted to treat conservatively with hormone therapy because of her age.  She is feeling well.  She has however noticed some drainage from a small cyst in the upper medial left breast.    No change in her past medical history.    Physical exam:  /69 (Patient Site: Left Arm, Patient Position: Sitting, Cuff Size: Adult Regular)  Pulse 86  Wt 143 lb (64.9 kg)  SpO2 98%  BMI 27.47 kg/m2  Elderly woman in no acute distress.  Breasts: No palpable masses in either breast.  In the upper medial left breast is a small sebaceous cyst that when I squeezed I was able to get out a small amount of sebaceous material that seemed to cause resolution.  Axilla: No axillary adenopathy.    Ultrasound: Ultrasound of the area the known cancer revealed a small residual lesion.  This measured approximately 4 x 4 mm today.  This is significantly smaller than the previous measurement.    Impression: Left breast cancer that has responded very well to hormone therapy.  At this point I would like to just continue the Arimidex.  We will have her follow-up with me again in 6 months.

## 2021-06-13 NOTE — PROGRESS NOTES
ASSESSMENT:  1.  Chronic anemia: This seems primarily related to iron deficiency.  She has been continued on an oral iron supplement.  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  Hemoglobin today remains low but fairly stable at 9.0.  Ferritin is pending.    2.  Anticoagulation due to recurrent DVT with underlying clotting disorder: She is comfortable with continuing anticoagulation.  She has been on branded Coumadin for many years.  She was advised to try generic warfarin to decrease her costs    3.  Osteoporosis: She is on Prolia with her last injection on July 27.  Repeat bone density study is due.    4.  Invasive ductal carcinoma left breast: This was diagnosed due to a palpable lump in the left breast on routine exam.  This is estrogen and progesterone receptor positive and HER 2 negative.  She was referred to Dr. Tinsley and started on Arimidex.  She tolerates this well.  Decreased size of the lump was noted when last checked    5.  Possible vitamin B12 deficiency: She formerly was on monthly B12 injections, but has not had these for a number of months.  A diagnosis of pernicious anemia was not truly established.  A recheck of the vitamin B12 level will be done.    6.  History of bladder cancer: Followed by Dr. Isaias Eid.  She does note a sensation of incomplete bladder emptying.  Last visit was December 2016    7.  History of depression: She is on low-dose mirtazapine.  She scores 3 on PHQ 9    8.  Hyperlipidemia: On simvastatin.  She does have a diagnosis of peripheral vascular disease.  Lipids will be checked today  PLAN:  1.  Labs as outlined.  She will be notified of test results  2.  Continue monthly hemoglobin checks  3.  Flu shot today  4.  Schedule bone density test.  Continue Procrit every 6 months  5.  Try generic warfarin when out of branded Coumadin to decrease cost.  She should check INR 1-2 weeks after changing to the generic.  6.   Clinic follow-up in 3 months or as needed    Orders Placed This Encounter   Procedures     DXA Bone Density Scan     Standing Status:   Future     Standing Expiration Date:   11/6/2018     Order Specific Question:   Can the procedure be changed per Radiologist protocol?     Answer:   Yes     Influenza High Dose, Seasonal 65+ yrs     HM2(CBC w/o Differential)     Ferritin     Lipid Cascade     Order Specific Question:   Fasting is required?     Answer:   Unknown     Comprehensive Metabolic Panel     Vitamin B12     Medications Discontinued During This Encounter   Medication Reason     ferrous sulfate 325 (65 FE) MG tablet Reorder     COUMADIN 5 mg tablet Reorder           ASSESSED PROBLEMS:  1. Iron deficiency anemia  HM2(CBC w/o Differential)    Ferritin    ferrous sulfate 325 (65 FE) MG tablet   2. Hyperlipidemia  Lipid Cascade   3. Medication monitoring encounter  Comprehensive Metabolic Panel   4. Need for influenza vaccination  Influenza High Dose, Seasonal 65+ yrs   5. Osteoporosis  DXA Bone Density Scan   6. Vitamin B 12 deficiency  Vitamin B12   7. DVT (deep venous thrombosis)  warfarin (COUMADIN) 5 MG tablet       CHIEF COMPLAINT:  Chief Complaint   Patient presents with     Follow-up     3 month follow up       HISTORY OF PRESENT ILLNESS:  Graciela is a 89 y.o. female presenting to the clinic today for a follow up. She was last seen in the clinic on 7/27/2017.     Anemia: She is taking an iron pill daily for her anemia. She occasionally forgets a dose, but she has been doing fairly well. Her energy level is okay some days and not on others. Her hemoglobin is 9.0 today, which is similar to what it has been the past few times it was checked.     Osteoporosis: Her last DXA done in 2015 put her in the osteoporotic range. She has had two Prolia injections so far with her last one being in July. She will get scheduled for another DXA.     Hx DVT: She anticoagulates on Coumadin, but she just found out that her  "insurance does not cover it. She inquires if she should try switching to warfarin.     Health Maintenance: She got the flu shot today.     REVIEW OF SYSTEMS:   She has a little swelling in her right leg. She wears a compression stocking on that leg, but her left leg is fine. She has not had any stomach problems. She does not have trouble with food getting stuck in her throat. She has had some issues with anemia in the past. She is going to see the urologist in December. She has been told that her bladder only empties half way. She gets up to urinate frequently during the night. She used to get vitamin B12 shots, but she does not know when her last one was. All other systems are negative.    PFSH:  Reviewed, as below.  .  Lives independently with her     TOBACCO USE:  History   Smoking Status     Never Smoker   Smokeless Tobacco     Not on file       VITALS:  Vitals:    11/06/17 1100   BP: 126/62   Patient Site: Left Arm   Patient Position: Sitting   Cuff Size: Adult Regular   Pulse: 88   Weight: 143 lb 9.6 oz (65.1 kg)   Height: 5' 0.5\" (1.537 m)     Wt Readings from Last 3 Encounters:   11/06/17 143 lb 9.6 oz (65.1 kg)   09/07/17 143 lb (64.9 kg)   07/27/17 144 lb 9.6 oz (65.6 kg)     Body mass index is 27.58 kg/(m^2).    PHYSICAL EXAM:  Constitutional:   Reveals an alert, pleasant, talkative woman. Affect appropriate. Ambulates with a single pronged cane, needs moderate assistance getting up on exam table. Vitals: per nursing notes.  HEENT: Atraumatic.   Neck:  Supple, no carotid bruits or adenopathy.  Back:  No spine or CVA pain.  Thorax:  No bony deformities.  Lungs: Clear to A&P without rales or wheezes.  Respiratory effort normal.  Cardiac:   Regular rate and rhythm, normal S1, S2, II/VI systolic murmur at left sternal border into aortic area.   Abdomen:  Soft, active bowel sounds without bruits, mass, or tenderness.  Extremities:  Compression stocking on right lower leg. Moderate varicosities on " left leg, no significant edema.   Skin:  Slightly pale but clear.   Neuro:  Alert and oriented. No gross focal deficits. Detailed exam not done.   Psychiatric:  Memory intact, mood appropriate.    ADDITIONAL HISTORY SUMMARIZED (2): Reviewed 12/29/2016 physical regarding osteoporosis and Prolia.   DECISION TO OBTAIN EXTRA INFORMATION (1): None.   RADIOLOGY TESTS (1): Reviewed 10/15/2015 DXA showing osteoporosis. DXA ordered.   LABS (1): Labs ordered and reviewed.   MEDICINE TESTS (1): None.  INDEPENDENT REVIEW (2 each): None.     The visit lasted a total of 21 minutes face to face with the patient. Over 50% of the time was spent counseling and educating the patient about her anemia. .    ITuan, am scribing for and in the presence of, Dr. Radford.    CARMEN CALIX, personally performed the services described in this documentation, as scribed by Tuan Damon in my presence, and it is both accurate and complete.    Dragon dictation was used for this note.  Speech recognition errors are a possibility.    MEDICATIONS:  Current Outpatient Prescriptions   Medication Sig Dispense Refill     ADACEL,TDAP ADOLESN/ADULT,,PF, 2 Lf-(2.5-5-3-5 mcg)-5Lf/0.5 mL injection        anastrozole (ARIMIDEX) 1 mg tablet Take 1 tablet (1 mg total) by mouth daily. 30 tablet 6     cholecalciferol, vitamin D3, (VITAMIN D3) 2,000 unit Tab Take 1 tablet (2,000 Units total) by mouth daily. 90 tablet 3     ferrous sulfate 325 (65 FE) MG tablet Take 1 tablet (325 mg total) by mouth daily with breakfast. 60 tablet 3     mirtazapine (REMERON) 7.5 MG tablet Take 7.5 mg by mouth bedtime.       omeprazole (PRILOSEC) 20 MG capsule Take 1 capsule (20 mg total) by mouth daily. 30 capsule 11     simvastatin (ZOCOR) 40 MG tablet TAKE ONE TABLET BY MOUTH DAILY AT BEDTIME 90 tablet 1     trimethoprim (TRIMPEX) 100 mg tablet Take 100 mg by mouth. ONE TABLET DAILY FOR THE FIRST SEVEN DAYS OF EACH MONTH.       valsartan-hydrochlorothiazide (DIOVAN-HCT)  80-12.5 mg per tablet TAKE ONE TABLET BY MOUTH DAILY 30 tablet 11     vitamins  A,C,E-zinc-copper (PRESERVISION AREDS) 14,320-226-200 unit-mg-unit cap Take 1 capsule by mouth daily.        warfarin (COUMADIN) 5 MG tablet Take 1/2 to 1 tablets (2.5 to 5 mg) by mouth daily. Adjust dose based on INR results as directed. 90 tablet 1     No current facility-administered medications for this visit.        Total data points: 4

## 2021-06-16 NOTE — PROGRESS NOTES
Graciela comes in for continued follow-up of the left breast cancer that we opted to treat nonoperatively with hormone therapy because of her age and extensive medical history.  She is feeling well.  She thinks she can still feel the lump.    Physical exam:  /79 (Patient Site: Right Arm, Patient Position: Sitting, Cuff Size: Adult Regular)  Breastfeeding? No  General: Somewhat frail elderly woman in no acute distress.  Has a hard time getting to the exam table.  Breast: Persistent palpable mass/thickening in the extreme lower medial left breast.  No other palpable masses.  No axillary adenopathy.  Used ultrasound to visualize it.  Was best visualized when I pulled her breast up and then looked at it.  Today and measured at 9 x 5 mm.    Impression: Left breast cancer.  Going back over this.  We have been following her for over a year.  Initially the mass measured about 2 cm.  It then shrunk quite a bit but last time I saw her I measured it at only 4 x 4 mm.  I am a little bit concerned that it appears to now be growing again.  She is very reluctant to have surgery.  She is on Coumadin and has a history of blood clots and needing to go on Lovenox for any surgery she does.  There is definite risk for bleeding then.  I did explain that sometimes it is just a different technique and how you do the measurement and the other option to doing surgery would be just to do one more somewhat shorter follow-up just to be sure it is not rapidly growing.  She very much wants to avoid surgery and would prefer to take that option.    Plan: Continue the anastrozole.  Follow-up with me again in 3 months this time and we will remeasure this area.

## 2021-06-16 NOTE — PROGRESS NOTES
ASSESSMENT:  1.  Chronic anemia aggravated by occult GI blood loss.  She remains on an iron supplement.  Hemogram will be checked.  Long-term anticoagulation is advised due to prothrombin gene mutation and recurrent deep venous thrombosis.    2.  Osteoporosis: Continuation of Prolia was advised.  Insurance needs to be rechecked.  She is at high risk since she is on Arimidex therapy.  Use of alendronate or IV bisphosphonates is contraindicated due to a GFR of under 35.   She also past history of [place]'s esophagus which would be a relative contraindication to use of an oral bisphosphonate    3.  Essential hypertension: Good control on current regimen.    4.  Anticoagulation due to recurrent DVT: She had used branded Coumadin in the past, but was concerned about expense.  She tried generic warfarin, but had recurrent sub-conjunctival hematoma.  INR is remained in the desired range while on the generic product.  Preauthorization for branded Coumadin will be submitted.  She could consider use of Jantoven if she is uncomfortable with generic warfarin    5.  History of left breast cancer: She is on Arimidex rather than having surgery.  The mass seems clinically unchanged since last visit.  She does have follow-up scheduled with Dr. Tinsley.    6.  Chronic renal insufficiency: Monitoring labs will be checked    PLAN:  1.  Labs as outlined.  She will be notified of test results.  2.  Check preauthorization for branded Coumadin  3.  Reconfirmed coverage for Prolia.  And continue injections every 6 months.  4.  See Dr. Tinsley is already scheduled  5.  Panic follow-up in 3 months or as needed    Orders Placed This Encounter   Procedures     HM2(CBC w/o Differential)     Basic Metabolic Panel     Medications Discontinued During This Encounter   Medication Reason     COUMADIN 5 mg tablet Reorder           ASSESSED PROBLEMS:  1. Iron deficiency anemia due to chronic blood loss  HM2(CBC w/o Differential)   2. Post-phlebitic  "syndrome  COUMADIN 5 mg tablet   3. Chronic renal insufficiency, stage 3 (moderate)  Basic Metabolic Panel   4. Prothrombin Gene Mutation  INR   5. History of DVT (deep vein thrombosis)  INR       CHIEF COMPLAINT:  Chief Complaint   Patient presents with     Follow-up     her coumadin is no longer covered.        HISTORY OF PRESENT ILLNESS:  Graciela is a 89 y.o. female presenting to the clinic today for follow up. She is accompanied by her daughter.     Left TKR: She was referred to orthopedics with left knee pain. She was sent for physical therapy, and reports it was very effective.     Breast Cancer: She continues on Arimidex for invasive ductal carcinoma of the left breast. She is followed by Dr. Tinsley and reports that the tumor is decreased.     DVT: She has taken Coumadin for years without trouble, but her insurance is no longer covering the brand name. She tried warfarin but experienced recurrent subconjunctival hematoma. She has resumed Coumadin and is paying for it herself. Her INR did not change with the switch.     REVIEW OF SYSTEMS:   All other systems are negative.    PFSH:      TOBACCO USE:  History   Smoking Status     Never Smoker   Smokeless Tobacco     Never Used       VITALS:  Vitals:    03/15/18 1103   BP: 114/66   Patient Site: Left Arm   Patient Position: Sitting   Cuff Size: Adult Regular   Pulse: 74   Resp: 16   Weight: 139 lb 3.2 oz (63.1 kg)   Height: 5' 0.5\" (1.537 m)     Wt Readings from Last 3 Encounters:   03/15/18 139 lb 3.2 oz (63.1 kg)   11/06/17 143 lb 9.6 oz (65.1 kg)   09/07/17 143 lb (64.9 kg)     Body mass index is 26.74 kg/(m^2).      MEDICATIONS:  Current Outpatient Prescriptions   Medication Sig Dispense Refill     ADACEL,TDAP ADOLESN/ADULT,,PF, 2 Lf-(2.5-5-3-5 mcg)-5Lf/0.5 mL injection        anastrozole (ARIMIDEX) 1 mg tablet Take 1 tablet (1 mg total) by mouth daily. 30 tablet 6     cholecalciferol, vitamin D3, (VITAMIN D3) 2,000 unit Tab Take 1 tablet (2,000 Units total) " by mouth daily. 90 tablet 3     COUMADIN 5 mg tablet 1/2-1 tab daily as directed 30 tablet 11     ferrous sulfate 325 (65 FE) MG tablet TAKE 1 TABLET BY MOUTH TWICE DAILY 60 tablet 6     mirtazapine (REMERON) 7.5 MG tablet TAKE 1 TABLET BY MOUTH AT BEDTIME 30 tablet 11     omeprazole (PRILOSEC) 20 MG capsule Take 1 capsule (20 mg total) by mouth daily. 30 capsule 11     simvastatin (ZOCOR) 40 MG tablet TAKE 1 TABLET BY MOUTH DAILY AT BEDTIME 90 tablet 0     trimethoprim (TRIMPEX) 100 mg tablet Take 100 mg by mouth. ONE TABLET DAILY FOR THE FIRST SEVEN DAYS OF EACH MONTH.       valsartan-hydrochlorothiazide (DIOVAN-HCT) 80-12.5 mg per tablet TAKE 1 TABLET BY MOUTH DAILY 30 tablet 8     vitamins  A,C,E-zinc-copper (PRESERVISION AREDS) 14,320-226-200 unit-mg-unit cap Take 1 capsule by mouth daily.        No current facility-administered medications for this visit.        PHYSICAL EXAM:  Constitutional:   Reveals an alert, talkative elderly woman. Affect appropriate. Does not seem acutely ill. She ambulates with a single-pronged cane. She can get onto the exam table with minimal assistance. Vitals: per nursing notes.  HEENT:  Atraumatic. Ears:  External canals, TMs clear.    Eyes:  No subconjunctival hematoma.   Neck:  Supple, no carotid bruits or adenopathy.  Back:  Mild thoracic kyphosis.   Breasts: Mildly tender 2cm lump in medial aspect of left breast. No other masses. No axillary nodes.   Lungs: Clear to A&P without rales or wheezes.  Respiratory effort normal.  Cardiac:   Regular rate and rhythm, normal S1, S2, no murmur.  Abdomen:  Soft, active bowel sounds without bruits, mass, or tenderness.  Extremities:   No peripheral edema, compression stocking present on right lower leg.     Skin:  Clear. No abnormal pallor.   Neuro:  Alert and pleasant. No gross focal deficits. Detailed exam not done.     ADDITIONAL HISTORY SUMMARIZED (2): None.  DECISION TO OBTAIN EXTRA INFORMATION (1): None.   RADIOLOGY TESTS (1):  None.  LABS (1): Labs ordered..  MEDICINE TESTS (1): None.  INDEPENDENT REVIEW (2 each): None.     The visit lasted a total of 22 minutes face to face with the patient. Over 50% of the time was spent counseling and educating the patient about Coumadin.    I, Klaus Ventura, am scribing for and in the presence of, Dr. Radford.    ICar, personally performed the services described in this documentation, as scribed by Klaus Ventura in my presence, and it is both accurate and complete.    Dragon dictation was used for this note.  Speech recognition errors are a possibility.      Total data points: 1

## 2021-06-18 NOTE — ANESTHESIA POSTPROCEDURE EVALUATION
"Patient: Graciela Hopkins  Left Lumpectomy  Anesthesia type: MAC    Patient location: Phase II Recovery  Last vitals:   Vitals:    06/08/18 1200   BP:    Pulse:    Resp: 14   Temp:    SpO2:      Post vital signs: stable  Level of consciousness: awake and responds to simple questions  Post-anesthesia pain: pain controlled  Post-anesthesia nausea and vomiting: no  Pulmonary: unassisted, return to baseline  Cardiovascular: stable and blood pressure at baseline  Hydration: adequate  Anesthetic events: no    QCDR Measures:  ASA# 11 - Lily-op Cardiac Arrest: ASA11B - Patient did NOT experience unanticipated cardiac arrest  ASA# 12 - Lily-op Mortality Rate: ASA12B - Patient did NOT die  ASA# 13 - PACU Re-Intubation Rate: NA - No ETT / LMA used for case  ASA# 10 - Composite Anes Safety: ASA10A - No serious adverse event    Additional Notes:    Vitals:    06/08/18 0950 06/08/18 1151 06/08/18 1200   BP: 171/73 125/78    Pulse: 88 85    Resp: 16 12 14   Temp: 36.6  C (97.8  F) 36.7  C (98.1  F)    TempSrc: Temporal Temporal    SpO2: 100% 95%    Weight: 139 lb (63 kg)     Height: 5' 0.5\" (1.537 m)         "

## 2021-06-18 NOTE — PROGRESS NOTES
"Graciela comes in for continued follow-up of her left breast cancer that we opted to treat conservatively with just hormone therapy.  She is at high risk for surgery with a history of DVTs.  Initially she appeared to be responding quite nicely to hormone therapy but on the most recent ultrasound the lesion appeared to be a little bit bigger.  She is feeling well.  She has no new complaints today.    Physical exam:  /72 (Patient Site: Left Arm, Patient Position: Sitting, Cuff Size: Adult Regular)  Pulse 82  Ht 5' 0.5\" (1.537 m)  Wt 139 lb (63 kg)  SpO2 98%  Breastfeeding? No  BMI 26.7 kg/m2  General: Elderly but alert woman in no acute distress.  Breasts: Clearly palpable mass in the very extreme medial inferior aspect of the left breast.  On ultrasound today it almost appears like there is a secondary lesion to the primary lesion.  You can still clearly see the primary lesion that does not look that much different than last time, it measures about 7 mm but then there is an area extending out from it it is a little hard to characterize as it has a little bit smoother borders.  Axilla: No axillary adenopathy    Impression: Left breast cancer that now seems to be growing despite hormone therapy.  I think we need to bite the bullet unfortunately despite her wrists and go ahead with surgery.  Her biggest risk is going to be obviously bleeding or blood clots.  She is going to see Dr. Radford to discuss bridging with Lovenox.    Plan: Left lumpectomy.  Do not plan to do a sentinel lymph node biopsy as that will not change her therapy going forward.  We should build to do this as an outpatient under local anesthetic with IV sedation.  "

## 2021-06-18 NOTE — PROGRESS NOTES
Preoperative Exam    Scheduled Procedure: Left Lumpectomy  Surgery Date: 6/8/48  Surgery Location: [facility], fax [phone]    Surgeon:  Ronda Tinsley MD    Assessment/Plan:     1. 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  Graciela appears medically stable for the planned procedure.  She initially had been treated with arimidex.  Surgery is scheduled since the tumor appears somewhat larger  - Electrocardiogram Perform and Read  - Basic Metabolic Panel  - HM2(CBC w/o Differential)  - Urinalysis-UC if Indicated  - Culture, Urine    2. 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  She will be bridged with lower dose Lovenox  - enoxaparin (LOVENOX) 40 mg/0.4 mL syringe; Inject 0.4 mL (40 mg total) under the skin every 12 (twelve) hours.  Dispense: 5 Syringe; Refill: 0    3. Prothrombin Gene Mutation  See above  - INR    4. History of iron deficiency anemia  She remains on an iron supplement.  Hemogram will be checked    5.  Recurrent UTI:  She is on trimethoprim as a preventative agent.  Urine analysis will be checked    6.  Esophageal reflux with history of England's esophagus;  Doing well on maintenance omeprazole.    7.  Hypercholesterolemia:  On simvastatin    8.  Essential hypertension:  Good control with valsartan-HCTZ.  This will be held the a.m. of planned surgery.    Plan:  1.  EKG was done and reviewed.  This showed sinus rhythm with a rate of 83 and no conduction abnormalities.  2.  Wear compressive stockings.  3.  Stop warfarin 5 days preoperatively.  4.  Start Lovenox 3 days before surgery.  40 mg subcu twice daily.  5.  Stop Lovenox 24 hour hours before the planned procedure  6.  Skip all medications the  morning of the procedure, and resume on returning home.  7.  Restart usual Coumadin dose after surgery if okay with Dr. Tinsley.  8.  No contraindications to the planned procedure are noted  - Electrocardiogram Perform and Read  - Basic Metabolic Panel  - HM2(CBC w/o Differential)  - Urinalysis-UC if Indicated  - Culture, Urine    Addendum:  Urine culture returned positive for E. coli resistant to amoxicillin and Bactrim.  She will receive a short course of cephalexin pre-procedure        Surgical Procedure Risk: Low (reported cardiac risk generally < 1%)  Have you had prior anesthesia?: Yes  Have you or any family members had a previous anesthesia reaction:  No  Do you or any family members have a history of a clotting or bleeding disorder?: Yes: She has had blood clots in her right leg.   Cardiac Risk Assessment: no increased risk for major cardiac complications    Patient approved for surgery with general or local anesthesia.    Please Note:  Patient has been on warfarin chronically, and was bridged with Lovenox preprocedure    Functional Status: Independent  Patient plans to recover at home with family.      Subjective:      Graciela Hopkins is a 89 y.o. female who presents for a preoperative consultation.     Per the Breast clinic note, Graciela has a history of left breast cancer that she opted to treat conservatively with just hormone therapy.  She is at high risk for surgery with a history of DVTs. Initially she appeared to be responding quite nicely to hormone therapy but on the most recent ultrasound the lesion appeared to be a little bit bigger. She decided to do a sentinel lymph node biopsy as that will not change her therapy going forward.    ROS  She reports frequent urination but no dysuria.     All other systems reviewed and are negative, other than those listed in the HPI.    Pertinent History  Do you have difficulty breathing or chest pain after walking up a flight of stairs: No  History of  obstructive sleep apnea: No  Steroid use in the last 6 months: No  Frequent Aspirin/NSAID use: No  Prior Blood Transfusion: No  Prior Blood Transfusion Reaction: No  If for some reason prior to, during or after the procedure, if it is medically indicated, would you be willing to have a blood transfusion?:  There is no transfusion refusal.    Current Outpatient Prescriptions   Medication Sig Dispense Refill     ADACEL,TDAP ADOLESN/ADULT,,PF, 2 Lf-(2.5-5-3-5 mcg)-5Lf/0.5 mL injection        anastrozole (ARIMIDEX) 1 mg tablet Take 1 tablet (1 mg total) by mouth daily. 30 tablet 3     cholecalciferol, vitamin D3, (VITAMIN D3) 2,000 unit Tab Take 1 tablet (2,000 Units total) by mouth daily. 90 tablet 3     COUMADIN 5 mg tablet 1/2-1 tab daily as directed 30 tablet 11     ferrous sulfate 325 (65 FE) MG tablet Take 1 tablet (325 mg total) by mouth 2 (two) times a day. 60 tablet 0     mirtazapine (REMERON) 7.5 MG tablet TAKE 1 TABLET BY MOUTH AT BEDTIME 30 tablet 11     omeprazole (PRILOSEC) 20 MG capsule Take 1 capsule (20 mg total) by mouth daily. 30 capsule 11     simvastatin (ZOCOR) 40 MG tablet TAKE 1 TABLET BY MOUTH DAILY AT BEDTIME 90 tablet 2     trimethoprim (TRIMPEX) 100 mg tablet Take 100 mg by mouth. ONE TABLET DAILY FOR THE FIRST SEVEN DAYS OF EACH MONTH.       valsartan-hydrochlorothiazide (DIOVAN-HCT) 80-12.5 mg per tablet TAKE 1 TABLET BY MOUTH DAILY 30 tablet 8     vitamins  A,C,E-zinc-copper (PRESERVISION AREDS) 14,320-226-200 unit-mg-unit cap Take 1 capsule by mouth daily.        enoxaparin (LOVENOX) 40 mg/0.4 mL syringe Inject 0.4 mL (40 mg total) under the skin every 12 (twelve) hours. 5 Syringe 0     No current facility-administered medications for this visit.         Allergies   Allergen Reactions     Amlodipine      Edema       Lansoprazole Diarrhea     dairrhea       Nitrofurantoin Macrocrystalline Nausea Only     nausea,chills,anorexia       Rabeprazole Rash     rash       Shellfish Containing  Products        Patient Active Problem List   Diagnosis     Anemia     Macular Degeneration     Chronic Reflux Esophagitis     Osteoporosis     Prothrombin Gene Mutation     Methylenetetrahydrofolate Reductase Deficiency     England's esophagus     Bladder Cancer     Hyperlipidemia     Essential hypertension     Chronic Renal Insufficiency     Hemangioma     Peripheral Vascular Disease     Osteoarthritis     Atherosclerosis     History of DVT (deep vein thrombosis)     Dizziness     Iron deficiency     Polymyalgia rheumatica     Malignant neoplasm of lower-inner quadrant of left breast in female, estrogen receptor positive       Past Medical History:   Diagnosis Date     Anemia      Breast cancer     left breast     History of DVT (deep vein thrombosis)      Muscular degeneration      Pain in hand      Pain in neck        Past Surgical History:   Procedure Laterality Date     BREAST BIOPSY Left     benign     NE ARTHROPLASTY TIBIAL PLATEAU      Description: Knee Replacement;  Proc Date: 01/01/2009;  Comments: RIGHT TOTAL KNEE REPLACEMENT     NE ARTHROPLASTY TIBIAL PLATEAU      Description: Knee Replacement;  Recorded: 02/23/2011;     NE BIOPSY OF BREAST, INCISIONAL      Description: Biopsy Breast Open;  Recorded: 03/20/2008;     NE DILATION/CURETTAGE,DIAGNOSTIC      Description: Dilation And Curettage;  Recorded: 03/20/2008;     NE TRANSCATH STENT INIT VESSEL,OPEN      Description: Transcath Intravascular Stent Placement Percutaneous Femoral;  Recorded: 03/20/2008;  Comments: RIGHT SFA       Social History     Social History     Marital status:      Spouse name: N/A     Number of children: N/A     Years of education: N/A     Occupational History     Not on file.     Social History Main Topics     Smoking status: Never Smoker     Smokeless tobacco: Never Used     Alcohol use No     Drug use: No     Sexual activity: Not on file     Other Topics Concern     Not on file     Social History Narrative     Patient  Care Team:  Car Radford MD as PCP - General (Internal Medicine)  Ronda Tinsley MD as Surgeon (General Surgery)        Objective:     Vitals:    06/01/18 1431   BP: 136/66   Pulse: 82   Temp: 98.6  F (37  C)   TempSrc: Tympanic   Weight: 140 lb (63.5 kg)   Height: 5' (1.524 m)     Physical Exam:  Constitutional:   Reveals an alert, pleasant, elderly female. Ambulates fairly well with a single prong cane. Compression stocking on right leg. Vitals: per nursing notes.  HEENT:  Ears:  External canals, TMs clear. Normal    Eyes:  EOMs full, PERRL. Normal  Oropharynx:   Mouth and throat clear, no thrush or exudate. Normal  Neck:  Supple, no carotid bruits or adenopathy. Normal  Back:  No spine or CVA pain.  Thorax:  No bony deformities.  Lungs: Clear to A&P without rales or wheezes.  Respiratory effort normal.  Cardiac:   Regular rate and rhythm, occasional irregular beats. 1-2/6 left systolic murmur at the left sternal border.  Abdomen:  Soft, active bowel sounds without bruits, mass, or tenderness.  Extremities:   No peripheral edema, pulses in the feet intact.    Skin:  No jaundice, peripheral cyanosis or lesions to suggest malignancy. No skin ulcers noted.   Neuro:  Alert and oriented. Cranial nerves, motor, sensory exams are intact. No gross focal deficits. Detailed exam not done  EKG: Sinus rhythm rate 83, no ischemic changes or conduction abnormalities     Patient Instructions   Stop Coumadin 5 days before surgery. Restart after surgery unless surgeon says otherwise.    Start Lovenox 2 days after stopping Coumadin.    Get a standing INR.      EKG: Sinus rhythm with rate of 83.  No ischemic changes or conduction abnormality.    Labs:  Recent Results (from the past 240 hour(s))   Electrocardiogram Perform and Read   Result Value Ref Range    SYSTOLIC BLOOD PRESSURE  mmHg    DIASTOLIC BLOOD PRESSURE  mmHg    VENTRICULAR RATE 87 BPM    ATRIAL RATE 87 BPM    P-R INTERVAL 180 ms    QRS DURATION 82 ms    Q-T INTERVAL  350 ms    QTC CALCULATION (BEZET) 421 ms    P Axis 80 degrees    R AXIS 1 degrees    T AXIS 32 degrees    MUSE DIAGNOSIS       Normal sinus rhythm  Normal ECG  When compared with ECG of 04-DEC-2014 18:59,  No significant change was found  Confirmed by MIGUEL CASTILLO MD LOC: ([id number]) on 6/2/2018 9:44:17 AM     Basic Metabolic Panel   Result Value Ref Range    Sodium 142 136 - 145 mmol/L    Potassium 5.0 3.5 - 5.0 mmol/L    Chloride 109 (H) 98 - 107 mmol/L    CO2 22 22 - 31 mmol/L    Anion Gap, Calculation 11 5 - 18 mmol/L    Glucose 84 70 - 125 mg/dL    Calcium 9.9 8.5 - 10.5 mg/dL    BUN 41 (H) 8 - 28 mg/dL    Creatinine 1.38 (H) 0.60 - 1.10 mg/dL    GFR MDRD Af Amer 43 (L) >60 mL/min/1.73m2    GFR MDRD Non Af Amer 36 (L) >60 mL/min/1.73m2   HM2(CBC w/o Differential)   Result Value Ref Range    WBC 4.6 4.0 - 11.0 thou/uL    RBC 2.82 (L) 3.80 - 5.40 mill/uL    Hemoglobin 9.2 (L) 12.0 - 16.0 g/dL    Hematocrit 26.7 (L) 35.0 - 47.0 %    MCV 95 80 - 100 fL    MCH 32.8 27.0 - 34.0 pg    MCHC 34.6 32.0 - 36.0 g/dL    RDW 12.6 11.0 - 14.5 %    Platelets 161 140 - 440 thou/uL    MPV 7.7 7.0 - 10.0 fL   Urinalysis-UC if Indicated   Result Value Ref Range    Color, UA Yellow Colorless, Yellow, Straw, Light Yellow    Clarity, UA Cloudy (!) Clear    Glucose, UA Negative Negative    Bilirubin, UA Negative Negative    Ketones, UA Negative Negative    Specific Gravity, UA 1.013 1.001 - 1.030    Blood, UA Negative Negative    pH, UA 5.5 4.5 - 8.0    Protein, UA Negative Negative mg/dL    Urobilinogen, UA <2.0 E.U./dL <2.0 E.U./dL, 2.0 E.U./dL    Nitrite, UA Negative Negative    Leukocytes, UA Large (!) Negative    Bacteria, UA Moderate (!) None Seen hpf    RBC, UA 0-2 None Seen, 0-2 hpf    WBC, UA 10-25 (!) None Seen, 0-5 hpf    Squam Epithel, UA 0-5 None Seen, 0-5 lpf    WBC Clumps Present (!) None Seen    Mucus, UA Few (!) None Seen lpf   INR   Result Value Ref Range    INR 3.20 (H) 0.90 - 1.10   Culture, Urine   Result Value  Ref Range    Culture >100,000 col/ml Escherichia coli (!)        Susceptibility    Escherichia coli - AVA     Amoxicillin + Clavulanate 8/4 Sensitive      Ampicillin >16 Resistant      Cefazolin 8 Sensitive      Cefepime <=1 Sensitive      Ceftriaxone <=1 Sensitive      Ciprofloxacin <=0.5 Sensitive      Gentamicin <=2 Sensitive      Levofloxacin <=1 Sensitive      Meropenem <=0.25 Sensitive      Nitrofurantoin <=16 Sensitive      Tetracycline >8 Resistant      Tobramycin <=2 Sensitive      Trimethoprim + Sulfamethoxazole >2/38 Resistant        Immunization History   Administered Date(s) Administered     DT (pediatric) 10/18/2005     Influenza Q8y7-71, 01/11/2010     Influenza high dose, seasonal 09/18/2014, 09/29/2015, 11/25/2016, 11/06/2017     Influenza, inj, historic,unspecified 11/02/2007, 10/20/2008, 11/09/2009, 10/01/2010, 10/07/2011     Influenza, seasonal,quad inj 6-35 mos 10/19/2012, 10/21/2013     Pneumo Conj 13-V (2010&after) 09/29/2015     Pneumo Polysac 23-V 01/13/2005     Td,adult,historic,unspecified 10/18/2005     Tdap 12/29/2016     ADDITIONAL HISTORY SUMMARIZED (2): Reviewed breast clinic note from 5/22/18 regarding breast exam.  DECISION TO OBTAIN EXTRA INFORMATION (1): None.   RADIOLOGY TESTS (1): None.  LABS (1): Ordered labs and urinalysis.  MEDICINE TESTS (1): None.  INDEPENDENT REVIEW (2 each):     The visit lasted a total of 25 minutes face to face with the patient. Over 50% of the time was spent counseling and educating the patient about lumpectomy.    IMackenzie, am scribing for and in the presence of, Dr. Radford.    IDr. Dr. Radford, personally performed the services described in this documentation, as scribed by Mackenzie Baron in my presence, and it is both accurate and complete.    Total Data Points: 5    Electronically signed by Car Radford MD 06/01/18 2:28 PM

## 2021-06-18 NOTE — ANESTHESIA PREPROCEDURE EVALUATION
Anesthesia Evaluation      Patient summary reviewed   No history of anesthetic complications     Airway   Mallampati: IV  Neck ROM: full   Pulmonary     breath sounds clear to auscultation  (-) shortness of breath                         Cardiovascular   Exercise tolerance: < 4 METS  (+) hypertension, CAD, , hypercholesterolemia,     (-) angina  ECG reviewed (NSR)  Rhythm: regular   murmur Location:upper left sternal border   ROS comment: Ambulation limited by knee pain     Neuro/Psych    (+) chronic pain    Endo/Other    (+) arthritis,      GI/Hepatic/Renal    (+) GERD,   chronic renal disease CRI,      Other findings:     NPO 4 PM    Fx hx of clotting disorder  Hx of PVCs                Anemia     Macular Degeneration    Chronic Reflux Esophagitis    Osteoporosis    Prothrombin Gene Mutation    Methylenetetrahydrofolate Reductase Deficiency    England's esophagus    Bladder Cancer    Hyperlipidemia    Essential hypertension    Chronic Renal Insufficiency    Hemangioma    Peripheral Vascular Disease    Osteoarthritis    Atherosclerosis    History of DVT (deep vein thrombosis)    Dizziness    Iron deficiency    Polymyalgia rheumatica    Malignant neoplasm of lower-inner quadrant of left breast in female, estrogen receptor positive         Results for SABINE RADHA LOWRY (MRN [id number]) as of 6/8/2018 6/8/2018 10:23  POC INR: 1.10        Results for SYLWIANAHID RADHA LOWRY (MRN [id number]) as of 6/8/2018 10:05    6/1/2018 15:38  Sodium: 142  Potassium: 5.0  Chloride: 109 (H)  CO2: 22  Anion Gap, Calculation: 11  BUN: 41 (H)  Creatinine: 1.38 (H)  GFR MDRD Af Amer: 43 (L)  GFR MDRD Non Af Amer: 36 (L)  Calcium: 9.9  Glucose: 84  INR: 3.20 (H)  WBC: 4.6  RBC: 2.82 (L)  Hemoglobin: 9.2 (L)  Hematocrit: 26.7 (L)  MCV: 95  MCH: 32.8  MCHC: 34.6  RDW: 12.6  Platelets: 161  MPV: 7.7            Dental    (+) poor dentition                       Anesthesia Plan  Planned anesthetic: MAC      No Versed  INR 6/1/18 was 3.2. Repeat  1.1            ASA 3     Anesthetic plan and risks discussed with: patient and child/children  Anesthesia plan special considerations: antiemetics,   Post-op plan: routine recovery        Felisha Swift MD  Staff Anesthesiologist  [facility]  6/8/18

## 2021-06-18 NOTE — PROGRESS NOTES
In for follow-up of her left lumpectomy  without sentinel lymph node biopsy.  She is feeling well.  She is having very minimal pain.      Physical exam:  Appears well.  Does not appear in any discomfort.  Breasts: Incisions are healing nicely with no signs of infection.  No swelling.    Pathology: The tumor was 2.5cm.  The margins are reported as  positive however the margin reported is the anterior margin which is skin.     Impression: Postop visit. Doing well. Went over what further therapy would be indicated.  At her age it would not tend to recommend radiation.  1 of her daughters actually seems fairly interested in this because of the close margin.  However with her age I am just not sure how well she would tolerate it.  I given the option of at least visiting with the radiation oncologist.  The patient herself is not interested.  They are going to talk about it and then get back to me.  In the meantime we will plan to keep her on anastrozole.    Plan: Follow-up with me in 6 months with diagnostic mammograms at that time.

## 2021-06-18 NOTE — ANESTHESIA CARE TRANSFER NOTE
Last vitals:   Vitals:    06/08/18 1151   BP: 125/78   Pulse: 85   Resp: 12   Temp: 36.7  C (98.1  F)   SpO2: 95%     Patient's level of consciousness is awake  Spontaneous respirations: yes  Maintains airway independently: yes  Dentition unchanged: yes  Oropharynx: oropharynx clear of all foreign objects    QCDR Measures:  ASA# 20 - Surgical Safety Checklist: WHO surgical safety checklist completed prior to induction  PQRS# 430 - Adult PONV Prevention: 4558F - Pt received => 2 anti-emetic agents (different classes) preop & intraop  ASA# 8 - Peds PONV Prevention: NA - Not pediatric patient, not GA or 2 or more risk factors NOT present  PQRS# 424 - Lily-op Temp Management: 4559F - At least one body temp DOCUMENTED => 35.5C or 95.9F within required timeframe  PQRS# 426 - PACU Transfer Protocol: - Transfer of care checklist used  ASA# 14 - Acute Post-op Pain: ASA14B - Patient did NOT experience pain >= 7 out of 10

## 2021-06-19 NOTE — PROGRESS NOTES
The following steps were completed to comply with the REMS program for Prolia:  1. Ordering provider has previously reviewed information in the Medication Guide and Patient Counseling Chart, including the serious risks of Prolia  and the symptoms of each risk and have been advised  to seek prompt medical attention if they have signs or symptoms of any of the serious risks.  2. Provided each patient a copy of the Medication Guide and Patient Brochure.  See MAR for administration details.   Indication: Prolia  (denosumab) is a prescription medicine used to treat osteoporosis in patients who:   Are at high risk for fracture, meaning patients who have had a fracture related to osteoporosis, or who have multiple risk factors for fracture; Cannot use another osteoporosis medicine or other osteoporosis medicines did not work well.   The timeline for early/late injections would be 4 weeks early and any time after the 6 month wayne. If a patient receives their injection late, then the subsequent injection would be 6 months from the date that they actually received the injection    1.  When was the last injection?  7/27/17  2.  Has insurance for this injection been verified?  Yes

## 2021-06-19 NOTE — PROGRESS NOTES
ASSESSMENT:  1.  Osteoporosis: She was late on her last Prolia injection.  This recent was in 7/27/17.  This will be given today    2.  History of lumpectomy for breast cancer: She had an uncomplicated postoperative recovery.  She should continue Arimidex.    3.  Iron deficiency anemia due to occult GI blood loss: Iron studies and hemogram will be checked.    4.  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  IV iron supplements could be used if needed.    5.  Essential hypertension: Blood pressure is under good control with Diovan HCT: Information became available after the appointment that certain batches of valsartan are contaminated with a potential carcinogen.  Graciela will be contacted to see if she needs to change the medication.  Losartan-HCTZ would be a good alternative.    6.  Recent UTI: She has been on suppressive therapy with trimethoprim.  The most recent urine culture was positive for E. coli resistant to Bactrim.  She currently is asymptomatic.    PLAN:  1.  Lab as outlined.  She will be notified of test result.  2.  A message was left for Graciela to call back regarding valsartan.  As noted above, losartan-HCTZ would be a good alternative if necessary  3.  IV iron could be used if significant iron deficiency develops.  4.  Continue warfarin with regular INRs.  Clinic follow-up with hemoglobin check in 3 months  Orders Placed This Encounter   Procedures     Basic Metabolic Panel     Ferritin     Iron and Transferrin Iron Binding Capacity     HM2(CBC w/o Differential)     Medications Discontinued During This Encounter   Medication Reason     HYDROcodone-acetaminophen 5-325 mg per tablet Therapy completed     ferrous sulfate 325 (65 FE) MG tablet Therapy completed     enoxaparin (LOVENOX) 40 mg/0.4 mL syringe Therapy completed     ADACEL,TDAP ADOLESN/ADULT,,PF, 2 Lf-(2.5-5-3-5  mcg)-5Lf/0.5 mL injection Therapy completed     Administrations This Visit     denosumab 60 mg (PROLIA 60 mg/ml)     Admin Date Action Dose Route Administered By             07/16/2018 Given 60 mg Subcutaneous Ellen Ervin CMA                              ASSESSED PROBLEMS:  1. Age-related osteoporosis without current pathological fracture  denosumab 60 mg (PROLIA 60 mg/ml)    Basic Metabolic Panel   2. Anemia, iron deficiency  Ferritin    Iron and Transferrin Iron Binding Capacity    HM2(CBC w/o Differential)       CHIEF COMPLAINT:  Chief Complaint   Patient presents with     Follow-up     3 month follow up     Injections     prolia and Shinrix       HISTORY OF PRESENT ILLNESS:  Graciela is a 89 y.o. female presenting to the clinic today to follow up on her breast cancer and anemia. She is accompanied by her daughter.     Breast Cancer: Her left lumpectomy in June went well. She did not have much pain and did not need to take hydrocodone. They discussed radiation therapy but Dr. Tinsley does not typically recommend it for patients her age. She did not have any issues with bleeding or clots. They do plan to keep her on anastrozole and will check a mammogram at six months.     Iron Deficiency Anemia: She had to stop taking an iron supplement because she became too constipated, but her energy level has been okay. She has always had some trouble with constipation, but this was intolerable. Her last hemoglobin was 9.2 g/dL on 6/1.     Recurrent UTI: She takes trimethoprim for the first week of every month to help prevent urinary tract infections. Her 6/1 urine culture grew out E. Coli, which was resistant to trimethoprim    Osteoporosis: She is due for a Prolia shot today.     Health Maintenance: She checked with her insurance company and will plan to get the Shingrix vaccine at the pharmacy.     REVIEW OF SYSTEMS:   She wears her compression stocking most of the time. She is occasionally dizzy with positional changes.  She had a few loose stools the other day and inquires if that is anything to worry about. She feels a cracking in her head and neck area. All other systems are negative.    PFSH:  She has had shingles.     TOBACCO USE:  History   Smoking Status     Never Smoker   Smokeless Tobacco     Never Used       VITALS:  Vitals:    07/16/18 1246   BP: 102/56   Patient Site: Left Arm   Patient Position: Sitting   Cuff Size: Adult Regular   Pulse: 72   Weight: 139 lb 12.8 oz (63.4 kg)     Wt Readings from Last 3 Encounters:   07/16/18 139 lb 12.8 oz (63.4 kg)   06/15/18 139 lb (63 kg)   06/08/18 139 lb (63 kg)     Body mass index is 26.85 kg/(m^2).    PHYSICAL EXAM:  Constitutional:   Reveals an alert, pleasant, talkative female. Ambulates easily using a single-pronged cane. Does not appear acutely ill. Vitals: per nursing notes.  Neck:  Supple, no carotid bruits or adenopathy.  Back:  No spine or CVA pain.  Thorax:  No bony deformities.  Lungs: Clear to A&P without rales or wheezes.  Respiratory effort normal.  Cardiac:   Regular rate with frequent irregular beats. II/VI systolic ejection murmur left sternal border to aortic area.   Abdomen:  Soft, active bowel sounds without bruits, mass, or tenderness.  Extremities:  Compression stocking on the right, no significant edema.     Skin: Slightly pale, otherwise clear  Psychiatric:  Memory intact, mood appropriate.    ADDITIONAL HISTORY SUMMARIZED (2): Reviewed 6/15/2018 Dr. Tinsley note regarding breast cancer.   DECISION TO OBTAIN EXTRA INFORMATION (1): None.   RADIOLOGY TESTS (1): None.  LABS (1): Labs from 6/1/2018 reviewed - E. Coli urine culture. Labs ordered.   MEDICINE TESTS (1): None.  INDEPENDENT REVIEW (2 each): None.     The visit lasted a total of 17 minutes face to face with the patient. Over 50% of the time was spent counseling and educating the patient about her breast cancer and anemia.    Tuan CALIX, am scribing for and in the presence of, Dr. Radford.    YOGI  Car Radford, personally performed the services described in this documentation, as scribed by Tuan Damon in my presence, and it is both accurate and complete.    Dragon dictation was used for this note.  Speech recognition errors are a possibility.    MEDICATIONS:  Current Outpatient Prescriptions   Medication Sig Dispense Refill     anastrozole (ARIMIDEX) 1 mg tablet Take 1 tablet (1 mg total) by mouth daily. 30 tablet 6     cholecalciferol, vitamin D3, (VITAMIN D3) 2,000 unit Tab Take 1 tablet (2,000 Units total) by mouth daily. 90 tablet 3     COUMADIN 5 mg tablet 1/2-1 tab daily as directed 30 tablet 11     mirtazapine (REMERON) 7.5 MG tablet TAKE 1 TABLET BY MOUTH AT BEDTIME 30 tablet 11     omeprazole (PRILOSEC) 20 MG capsule Take 1 capsule (20 mg total) by mouth daily. 30 capsule 11     simvastatin (ZOCOR) 40 MG tablet TAKE 1 TABLET BY MOUTH DAILY AT BEDTIME 90 tablet 2     trimethoprim (TRIMPEX) 100 mg tablet Take 100 mg by mouth. ONE TABLET DAILY FOR THE FIRST SEVEN DAYS OF EACH MONTH.       valsartan-hydrochlorothiazide (DIOVAN-HCT) 80-12.5 mg per tablet TAKE 1 TABLET BY MOUTH DAILY 30 tablet 8     vitamins  A,C,E-zinc-copper (PRESERVISION AREDS) 14,320-226-200 unit-mg-unit cap Take 1 capsule by mouth daily.        No current facility-administered medications for this visit.        Total data points: 3

## 2021-06-19 NOTE — LETTER
Letter by Car Radford MD at      Author: Car Radford MD Service: -- Author Type: --    Filed:  Encounter Date: 6/9/2019 Status: (Other)         Graciela Hopkins  [address] [place] [address] [address]  [place] [address]             June 9, 2019         Dear Ms. Hopkins,    Below are the results from your recent visit:    Resulted Orders   Basic Metabolic Panel   Result Value Ref Range    Sodium 135 (L) 136 - 145 mmol/L    Potassium 6.3 (HH) 3.5 - 5.0 mmol/L    Chloride 108 (H) 98 - 107 mmol/L    CO2 19 (L) 22 - 31 mmol/L    Anion Gap, Calculation 8 5 - 18 mmol/L    Glucose 100 70 - 125 mg/dL    Calcium 9.0 8.5 - 10.5 mg/dL    BUN 32 (H) 8 - 28 mg/dL    Creatinine 2.06 (H) 0.60 - 1.10 mg/dL    GFR MDRD Af Amer 27 (L) >60 mL/min/1.73m2    GFR MDRD Non Af Amer 22 (L) >60 mL/min/1.73m2    Narrative    Fasting Glucose reference range is 70-99 mg/dL per  American Diabetes Association (ADA) guidelines.   HM2(CBC w/o Differential)   Result Value Ref Range    WBC 5.0 4.0 - 11.0 thou/uL    RBC 2.76 (L) 3.80 - 5.40 mill/uL    Hemoglobin 8.8 (L) 12.0 - 16.0 g/dL    Hematocrit 26.4 (L) 35.0 - 47.0 %    MCV 95 80 - 100 fL    MCH 31.9 27.0 - 34.0 pg    MCHC 33.4 32.0 - 36.0 g/dL    RDW 11.9 11.0 - 14.5 %    Platelets 208 140 - 440 thou/uL    MPV 8.1 7.0 - 10.0 fL       Graciela: Your potassium was elevated to 6.3.  This could be related to diminished kidney function, or could be an error related to hemolysis of the blood sample.  This should be rechecked.  Kidney function has declined from when last checked.  Suspect this is related to dehydration and poor oral intake.  Increase fluids.  Your hemoglobin has declined to 8.8.  This also should be rechecked next week    Please call with questions or contact us using [facility].    Sincerely,        Electronically signed by Car Radford MD

## 2021-06-20 NOTE — PROGRESS NOTES
ASSESSMENT:  1.  Anemia: This seems related to iron deficiency from low-grade occult GI blood loss.  She has been using oral iron, but tolerates it poorly.  Injectafer will be given as an alternate.    2.  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    3.  Breast cancer with previous lumpectomy: On her Arimidex.  Doing well.    4.  Essential hypertension: Good control on current regimen.  She reports that her pharmacist checked her valsartan batch, and it was not contaminated.    5.  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  Injectafer 750 mg IV.  Repeat second injection in 1 week  2.  Stop oral iron supplement when Injectafer is given  3.  Recheck hemoglobin 2 weeks after IV iron infusion  4.  Continue monthly hemoglobin checks after this with clinic follow-up 3 months    Orders Placed This Encounter   Procedures     HM2(CBC w/o Differential)     Standing Status:   Future     Standing Expiration Date:   9/6/2019     There are no discontinued medications.        ASSESSED PROBLEMS:  1. Anemia, iron deficiency  HM2(CBC w/o Differential)       CHIEF COMPLAINT:  Chief Complaint   Patient presents with     Follow-up       HISTORY OF PRESENT ILLNESS:  Graciela is a 89 y.o. female presenting to the clinic today for follow-up of anemia and other concerns.  She has been followed with serial hemoglobins due to a history of iron deficiency anemia related to occult GI blood loss.  She is taking ferrous sulfate 325 mg twice daily.  Despite this, her most recent hemoglobin is down to 7.7.  She has noted increased shortness of breath with minor exertion.  There is been no hemoptysis.  Stools are dark, but no visible blood is noted.    She is on chronic anticoagulation with warfarin due to recurrent deep venous thrombosis with prothrombin gene  mutation.  She is most comfortable with continuing anticoagulation.    She remains on valsartan-HCTZ for hypertension.  Pressure currently is good.  She was notified by her pharmacist that her valsartan patch was not contaminated.    She remains on Prolia for osteoporosis.  She is at heightened risk for decline in bone density since she is on Arimidex.    She remains on Arimidex following lumpectomy for estrogen receptor positive breast cancer.  She has been followed by Dr. Tinsley.    REVIEW OF SYSTEMS:   All other systems are negative.    PFSH:  .  Lives with her .  Her daughter is with her in clinic today    TOBACCO USE:  History   Smoking Status     Never Smoker   Smokeless Tobacco     Never Used       VITALS:  Vitals:    09/06/18 1244   BP: 136/62   Patient Site: Left Arm   Patient Position: Sitting   Cuff Size: Adult Regular   Pulse: (!) 102   SpO2: 99%   Weight: 140 lb (63.5 kg)     Wt Readings from Last 3 Encounters:   09/06/18 140 lb (63.5 kg)   07/16/18 139 lb 12.8 oz (63.4 kg)   06/15/18 139 lb (63 kg)     Body mass index is 26.89 kg/(m^2).      MEDICATIONS:  Current Outpatient Prescriptions   Medication Sig Dispense Refill     anastrozole (ARIMIDEX) 1 mg tablet Take 1 tablet (1 mg total) by mouth daily. 30 tablet 6     cholecalciferol, vitamin D3, (VITAMIN D3) 2,000 unit Tab Take 1 tablet (2,000 Units total) by mouth daily. 90 tablet 3     COUMADIN 5 mg tablet 1/2-1 tab daily as directed 30 tablet 11     ferric carboxymaltose 750 mg in sodium chloride 0.9% 50 mL IVPB Infuse 750 mg into a venous catheter every 7 days for 2 doses. 2 each 0     mirtazapine (REMERON) 7.5 MG tablet TAKE 1 TABLET BY MOUTH AT BEDTIME 30 tablet 11     omeprazole (PRILOSEC) 20 MG capsule Take 1 capsule (20 mg total) by mouth daily. 30 capsule 11     simvastatin (ZOCOR) 40 MG tablet TAKE 1 TABLET BY MOUTH DAILY AT BEDTIME 90 tablet 2     trimethoprim (TRIMPEX) 100 mg tablet Take 100 mg by mouth. ONE TABLET DAILY FOR  THE FIRST SEVEN DAYS OF EACH MONTH.       valsartan-hydrochlorothiazide (DIOVAN-HCT) 80-12.5 mg per tablet TAKE 1 TABLET BY MOUTH DAILY 30 tablet 8     vitamins  A,C,E-zinc-copper (PRESERVISION AREDS) 14,320-226-200 unit-mg-unit cap Take 1 capsule by mouth daily.        No current facility-administered medications for this visit.        PHYSICAL EXAM:  Constitutional:   Reveals an alert, talkative elderly woman. Does not seem acutely ill. She ambulates with a single pronged cane.  Vitals: per nursing notes.  HEENT:  Atraumatic.     Eyes:  No conjunctival hyperemia. .  Oropharynx:   Mouth and throat clear, no thrush or exudate.  Neck:  Supple, no carotid bruits or adenopathy.  Back:  No spine or CVA pain.  Thorax:  No bony deformities.  Lungs: Clear to A&P without rales or wheezes.  Respiratory effort normal.  Cardiac:   Regular rhythm, II/VI systolic murmur left sternal border to aortic area.   Abdomen:  Soft, active bowel sounds without bruits, mass, or tenderness.  Extremities:  Compressive stocking on right. No edema on left.     Skin:  Pale.  Neuro:  Alert and talkative.  No gross focal deficits. Detailed exam not done.     QUALITY MEASURES:      ADDITIONAL HISTORY SUMMARIZED (2): None.  DECISION TO OBTAIN EXTRA INFORMATION (1): None.   RADIOLOGY TESTS (1): None.  LABS (1): None.  MEDICINE TESTS (1): None.  INDEPENDENT REVIEW (2 each): None.     The visit lasted a total of 25 minutes face to face with the patient. Over 50% of the time was spent counseling and educating the patient about management of iron deficiency anemia.      Dragon dictation was used for this note.  Speech recognition errors are a possibility.

## 2021-06-20 NOTE — PROGRESS NOTES
Pt here for injectafer.  IV started with good blood return and injectafer given without complications.  Pt observed for 30 minutes without any issues.  Pt IV DC'd and site covered with gauze and coban. Pt and daughter given DC instructions.  Pt daughter knows when pt needs to return. Pt left stable with her daughter.

## 2021-06-20 NOTE — PROGRESS NOTES
Pt arrived with daughter to receive Feraheme. Pt tolerated infusion well. Left with daughter ambulatory at 1145.

## 2021-06-21 NOTE — ANESTHESIA CARE TRANSFER NOTE
Last vitals:   Vitals:    11/05/18 1528   BP: 141/87   Pulse: 89   Resp: 16   Temp: 36.7  C (98.1  F)   SpO2: 100%     Patient's level of consciousness is awake  Spontaneous respirations: yes  Maintains airway independently: yes  Dentition unchanged: yes  Oropharynx: oropharynx clear of all foreign objects    QCDR Measures:  ASA# 20 - Surgical Safety Checklist: WHO surgical safety checklist completed prior to induction  PQRS# 430 - Adult PONV Prevention: 4558F - Pt received => 2 anti-emetic agents (different classes) preop & intraop  ASA# 8 - Peds PONV Prevention: NA - Not pediatric patient, not GA or 2 or more risk factors NOT present  PQRS# 424 - Lily-op Temp Management: 4559F - At least one body temp DOCUMENTED => 35.5C or 95.9F within required timeframe  PQRS# 426 - PACU Transfer Protocol: - Transfer of care checklist used  ASA# 14 - Acute Post-op Pain: ASA14B - Patient did NOT experience pain >= 7 out of 10

## 2021-06-21 NOTE — ANESTHESIA PREPROCEDURE EVALUATION
Anesthesia Evaluation      Patient summary reviewed     Airway   Mallampati: II   Pulmonary - normal exam                          Cardiovascular - normal exam  (+) hypertension, CAD, ,     ECG reviewed        Neuro/Psych    (+) neuromuscular disease,      Endo/Other       Comments: H/O DVT    GI/Hepatic/Renal    (+)   chronic renal disease CRI,     Comments: Cr 1.49     Other findings: Hb 9.4, K 4.9      Dental - normal exam                        Anesthesia Plan  Planned anesthetic: MAC    ASA 3     Anesthetic plan and risks discussed with: patient    Post-op plan: routine recovery

## 2021-06-21 NOTE — PROGRESS NOTES
"ASSESSMENT:  1.  Acute on chronic blood loss anemia:  Graciela was seen at the lab for a scheduled hemoglobin checked due to chronic blood loss anemia.  Hemoglobin returned depressed to 6.1 compared to 7.7 when last checked.  She then was seen emergently as an \"add on\".  Transfusion is indicated.  She has a past history of esophageal reflux with previous diagnosis of England's.  This would put her at risk for an upper GI source of bleeding.  She also obviously could be at risk for lower GI source of bleeding.  She has not had a recent assessment for occult GI blood loss due to age and multiple other medical issues    2.  Melena  She reports black tarry stools recently.  This would suggest more rapid GI blood loss    3.  Chronic anticoagulation:  She has a history of prothrombin gene mutation with recurrent deep venous thrombosis.  She previously was on warfarin.  She was changed to Xarelto due to difficulties maintaining a stable INR.  Anticoagulation will need to be stopped at the present.  If a treatable cause for GI blood loss is not found, she may need to go off of anticoagulation chronically.    4.  History of breast cancer with lumpectomy:  On Arimidex    5.  Hypotension:  She normally is on valsartan-HCTZ for hypertension.  Hypotension undoubtedly is related to the anemia and hypovolemia her antihypertensive will be held.  I expect that blood pressure will come up with hydration and transfusion    PLAN:  Patient Instructions   1.  Transport to [facility] for admission and transfusion    2.  Stop Xarelto for now.    3. Stop Diovan-HCTZ due to hypotension.  Blood pressure will be reassessed after hydration and transfusion.    4.  Further evaluation for sources of occult GI blood loss will be discussed during her hospital stay                  ASSESSED PROBLEMS:  No diagnosis found.    CHIEF COMPLAINT:  Chief Complaint   Patient presents with     Follow-up     lab, Low hemoglobin       HISTORY OF PRESENT " ILLNESS:  Graciela is a 89 y.o. female presenting to the clinic today because her HgB was 6.1. She is accompanied by her daughter.     Anemia: She has black diarrhea, and states she has been having black stools for a few weeks. She is not taking any iron pills now. She got dizzy when she sat up in the office today. She had never had a transfusion in the past. She is willing to be transfused.     Blood thinning: Subconjunctival hematoma on her right eye. Per daughter. She had been getting these in both her eyes and were thinking maybe it was related to her coumadin.     Hypotension: Patient takes valsartan with HCTZ , and is currently hypotensive.     REVIEW OF SYSTEMS:   All other systems are negative.    PFSH:  Reviewed as below.  She lives with her .  He is currently in a transitional care unit due to a recent hospitalization with ongoing weakness    TOBACCO USE:  History   Smoking Status     Never Smoker   Smokeless Tobacco     Never Used       VITALS:  Vitals:    11/02/18 1432 11/02/18 1500   BP: (!) 86/44 (!) 88/58   Patient Site: Left Arm    Patient Position: Sitting    Cuff Size: Adult Regular    Pulse: 60    SpO2: 96%    Weight: 134 lb (60.8 kg)      Wt Readings from Last 3 Encounters:   11/02/18 134 lb (60.8 kg)   09/25/18 136 lb (61.7 kg)   09/06/18 140 lb (63.5 kg)     Body mass index is 25.74 kg/(m^2).    PHYSICAL EXAM:  Constitutional:   Reveals an alert pleasant elderly woman in no acute distress, affect appropriate. Ambulates with a candelario, somewhat unsteady on her feet but ambulates without assistance. 88/58 on repeat BP. Vitals: per nursing notes.  HEENT:  Atraumatic, Ears:  External canals, TMs clear.    Eyes:  Subconjunctival hemorrhage in right eye. EOMs full, PERRL.  Oropharynx:   Mouth and throat clear, no thrush or exudate.  Neck:  Supple, no carotid bruits or adenopathy.  Back:  No spine or CVA pain.  Thorax:  No bony deformities.  Lungs: Clear to A&P without rales or wheezes.   Respiratory effort normal.  Cardiac: 2/6 systolic murmur along the left sternal border, regular rate and rhythm, normal S1, S2.  Abdomen:  Soft, active bowel sounds without bruits, mass, or tenderness.  Extremities:   No peripheral edema, pulses in the feet intact.    Skin: Pale,  No jaundice, peripheral cyanosis or lesions to suggest malignancy.  Neuro:  Alert and oriented. No gross focal deficits.detailed exam not done.   Psychiatric:  Memory intact, mood appropriate.    ADDITIONAL HISTORY SUMMARIZED (2): None.  DECISION TO OBTAIN EXTRA INFORMATION (1): None.   RADIOLOGY TESTS (1): None.  LABS (1): Ordered labs today.   MEDICINE TESTS (1): None.  INDEPENDENT REVIEW (2 each): None.     The visit lasted a total of 14 minutes face to face with the patient. Over 50% of the time was spent counseling and educating the patient about anemia.    IRitesh, am scribing for and in the presence of, Dr. Radford.    Car CALIX, personally performed the services described in this documentation, as scribed by Ritesh Wilder in my presence, and it is both accurate and complete.    Dragon dictation was used for this note.  Speech recognition errors are a possibility.    MEDICATIONS:  Current Outpatient Prescriptions   Medication Sig Dispense Refill     anastrozole (ARIMIDEX) 1 mg tablet Take 1 tablet (1 mg total) by mouth daily. 30 tablet 6     cholecalciferol, vitamin D3, (VITAMIN D3) 2,000 unit Tab Take 1 tablet (2,000 Units total) by mouth daily. 90 tablet 3     mirtazapine (REMERON) 7.5 MG tablet TAKE 1 TABLET BY MOUTH AT BEDTIME 30 tablet 11     omeprazole (PRILOSEC) 20 MG capsule Take 1 capsule (20 mg total) by mouth daily. 30 capsule 11     rivaroxaban (XARELTO) 15 mg Tab Take 1 tablet (15 mg total) by mouth daily with supper. 30 tablet 11     simvastatin (ZOCOR) 40 MG tablet TAKE 1 TABLET BY MOUTH DAILY AT BEDTIME 90 tablet 2     trimethoprim (TRIMPEX) 100 mg tablet Take 100 mg by mouth. ONE TABLET DAILY FOR  THE FIRST SEVEN DAYS OF EACH MONTH.       valsartan-hydrochlorothiazide (DIOVAN-HCT) 80-12.5 mg per tablet TAKE 1 TABLET BY MOUTH DAILY 30 tablet 8     vitamins  A,C,E-zinc-copper (PRESERVISION AREDS) 14,320-226-200 unit-mg-unit cap Take 1 capsule by mouth daily.        No current facility-administered medications for this visit.        Total data points:1

## 2021-06-21 NOTE — ANESTHESIA POSTPROCEDURE EVALUATION
Patient: Graciela Hopkins  COLONOSCOPY, ESOPHAGOGASTRODUODENOSCOPY (EGD)  Anesthesia type: MAC    Patient location: PACU  Last vitals:   Vitals:    11/05/18 1627   BP: 115/52   Pulse: 84   Resp: 18   Temp: 36.8  C (98.2  F)   SpO2: 97%     Post vital signs: stable  Level of consciousness: awake and responds to simple questions  Post-anesthesia pain: pain controlled  Post-anesthesia nausea and vomiting: no  Pulmonary: unassisted, face mask  Cardiovascular: stable and blood pressure at baseline  Hydration: adequate  Anesthetic events: no    QCDR Measures:  ASA# 11 - Lily-op Cardiac Arrest: ASA11B - Patient did NOT experience unanticipated cardiac arrest  ASA# 12 - Lily-op Mortality Rate: ASA12B - Patient did NOT die  ASA# 13 - PACU Re-Intubation Rate: ASA13B - Patient did NOT require a new airway mgmt  ASA# 10 - Composite Anes Safety: ASA10A - No serious adverse event    Additional Notes:

## 2021-06-22 NOTE — TELEPHONE ENCOUNTER
Clinic Care Coordination Contact    Situation: Patient chart reviewed by care coordinator.    Background: CC following while open to home care to do call after services close.  CC also following spouse for same reason.      Assessment: Spouse has entered hospice and services are provided in their home.  No CC involvement needed at this time as they have comprehensive services through hospice and CC has left a message for daughter previously.     Plan/Recommendations: No further outreach by CC at this time.     Social Vidya Grubbs  [facility]  [email or web address]  [phone]      Clinic Care Coordination Contact  [facility]/Voicemail       Clinical Data: Care Coordinator Outreach- contacted daughter Mercy, consent on file.   Outreach attempted x 1.  Left message on voicemail with call back information and requested return call.  Plan:Care Coordinator will try to reach caregiver again in 3-5 business days.  Social Vidya Grubbs  [facility]  [email or web address]  [phone]

## 2021-06-22 NOTE — TELEPHONE ENCOUNTER
ANTICOAGULATION  MANAGEMENT    Assessment     Today's INR result of 3.4 is Supratherapeutic (goal INR of 2.0-3.0)        Warfarin taken as previously instructed    No new diet changes affecting INR    No new medication/supplements affecting INR    Continues to tolerate warfarin with no reported s/s of bleeding or thromboembolism     Previous INR was Therapeutic    Plan:     Left a detailed message for Graciela and daughter Dee Dee regarding INR result and instructed:     Warfarin Dosing Instructions:  Change warfarin dose to 5 mg daily on mon; and 2.5 mg daily rest of week  (11% change)    Instructed patient to follow up no later than: two weeks      Instructed to call the [facility] for any changes, questions or concerns. (#[phone])   ?   Caro Branham RN    Subjective/Objective:      Graciela Hopkins, a 89 y.o. female is on warfarin.     Graciela reports:     Home warfarin dose: as updated on anticoagulation calendar per template     Missed doses: No     Medication changes:  No     S/S of bleeding or thromboembolism:  No     New Injury or illness:  No     Changes in diet or alcohol consumption:  No     Upcoming surgery, procedure or cardioversion:  No    Anticoagulation Episode Summary     Current INR goal:   2.0-3.0   TTR:   67.0 % (3.6 wk)   Next INR check:   1/22/2019   INR from last check:   3.40! (1/8/2019)   Weekly max warfarin dose:      Target end date:   Indefinite   INR check location:      Preferred lab:      Send INR reminders to:   ANTICOAGULATION POOL A (WBY,WBE,MID,RSC)    Indications    Acute venous embolism and thrombosis of deep vessels of distal end of left lower extremity (H) [I82.4Z2]           Comments:            Anticoagulation Care Providers     Provider Role Specialty Phone number    Car Radford MD Referring Internal Medicine [phone]

## 2021-06-22 NOTE — PROGRESS NOTES
ASSESSMENT:  1. Acute cystitis without hematuria  Graciela has been on trimethoprim as a preventative agent for 1 week however the month due to a history of recurrent UTIs.  This has seemed to be of benefit.  It will be continued  - trimethoprim (TRIMPEX) 100 mg tablet; ONE TABLET DAILY FOR THE FIRST SEVEN DAYS OF EACH MONTH..  Dispense: 21 tablet; Refill: 3    2. Iron deficiency anemia due to chronic blood loss  She was taken off of anticoagulation during her recent hospital stay.  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  Hemoglobin will be rechecked today.  Overall she feels well  - HM2(CBC w/o Differential)    3. 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  Family now is concerned about swelling in the left leg.  D-dimer will be checked.  If abnormal, she would need a venous ultrasound checked  - D-dimer, Quantitative    4. Medication monitoring encounter  She has a history of chronic renal insufficiency.  Basic metabolic panel will be checked  - Basic Metabolic Panel; Future  - Basic Metabolic Panel    5.  History of bladder cancer  She should continue periodic urologic follow-up as in the past    PLAN:  Patient Instructions   1.  I will notify you of test results    2.  Continue Trimethoprim one week monthly    3.  See in one month    4.  Check insurance on Shingrix    5.  D-dimer did return elevated.  A venous ultrasound of the legs will be done  Orders Placed This Encounter   Procedures     D-dimer, Quantitative     HM2(CBC w/o Differential)     Basic Metabolic Panel     Standing Status:   Future     Number of Occurrences:   1     Standing Expiration Date:   11/30/2019     Medications Discontinued During This Encounter   Medication Reason     trimethoprim (TRIMPEX) 100 mg tablet Reorder           All patient medications were reconciled.     ASSESSED PROBLEMS:  1. Acute cystitis without hematuria   trimethoprim (TRIMPEX) 100 mg tablet   2. Iron deficiency anemia due to chronic blood loss  HM2(CBC w/o Differential)   3. Edema, unspecified type  D-dimer, Quantitative   4. Medication monitoring encounter  Basic Metabolic Panel    Basic Metabolic Panel       CHIEF COMPLAINT:  Chief Complaint   Patient presents with     Follow-up     INF. recheck labs     Leg Swelling     left leg       HISTORY OF PRESENT ILLNESS:  Graciela is a 89 y.o. female presenting to the clinic today for outpatient follow up from 11/2/18 to 11/7/18 in patient stay at [facility] for Iron deficiency.  She is accompanied by her  and two daughters.     Leg edema: It shad always been her right leg that had problems with DVTs, but now her left leg is having some edema which it has never done before. She is not having any chest pain or shortness of breath. Denies calf pain. It started about 2-3 weeks ago after she got off blood thinners during her hospital stay for low HgB.     Macular degeneration: She got an injection in the left eye at the eye doctor yesterday for this. She has some erythema in the eye because of it.      Abdominal imaging: She was told she had a watermelon stomach while in the hospital.     Was having recurrent UTIs and was put on TMP/SMX long term but her urologist has retired and she is wondering if they can get that without establishing care with a new urologist.     Ambulation; Per daughter patient looks like she is stronger walking with her cane or unaided than when she walks with her walker. She usually walks with a four point two wheeled walker.     REVIEW OF SYSTEMS:   Denies reduced appetite, excessive weakness, chest pain, shortness of breath  All other systems are negative.    PFSH:  She does not want to cook anymore.   If her  does not want to eat, then she usually does not eat.   Her daughters are very supportive and involved in caring for the patient and her .  Reviewed as below.     TOBACCO  USE:  Social History     Tobacco Use   Smoking Status Never Smoker   Smokeless Tobacco Never Used       VITALS:  Vitals:    11/30/18 1427   BP: 120/50   Patient Site: Left Arm   Patient Position: Sitting   Cuff Size: Adult Regular   Pulse: (!) 56   SpO2: 97%   Weight: 139 lb (63 kg)     Wt Readings from Last 3 Encounters:   11/30/18 139 lb (63 kg)   11/02/18 134 lb 6.4 oz (61 kg)   11/02/18 134 lb (60.8 kg)     Body mass index is 27.15 kg/m .    PHYSICAL EXAM:  Constitutional:   Reveals an alert talkative elderly woman, affect appropriate, no acute illness, ambulates easily without assistance, Vitals: per nursing notes.  HEENT: atraumatic   Neck:  Supple, no carotid bruits or adenopathy.  Back:  No spine or CVA pain.  Thorax:  No bony deformities.  Lungs: Clear to A&P without rales or wheezes.  Respiratory effort normal.  Cardiac:  2/6 systolic murmur along left sternal border, Regular rate and rhythm  Abdomen:  Soft, active bowel sounds without bruits, mass, or tenderness.  Extremities:  Wearing compressive stockings, 1+ edema left lower leg, and trace edema right lower leg,    Skin: No pallor,  No jaundice    QUALITY MEASURES:  The following high BMI interventions were performed this visit: encouragement to exercise    ADDITIONAL HISTORY SUMMARIZED (2): Hospital discharge summary 11/7/18 reviewed showing iron deficiency anemia treatment. Collateral information obtained from daughters throughout visit.   DECISION TO OBTAIN EXTRA INFORMATION (1): None.   RADIOLOGY TESTS (1): Reviewed  LABS (1): Reviewed  11/12/18, 11/8/18, 11/5/18 labs and ordered labs today.   MEDICINE TESTS (1): None.  INDEPENDENT REVIEW (2 each): None.     The visit lasted a total of 18 minutes face to face with the patient. Over 50% of the time was spent counseling and educating the patient about iron deficiency anemia.    Ritesh CALIX, am scribing for and in the presence of, Dr. Radford.    Car CALIX, personally performed the  services described in this documentation, as scribed by Ritesh Wilder in my presence, and it is both accurate and complete.    Dragon dictation was used for this note.  Speech recognition errors are a possibility.    MEDICATIONS:  Current Outpatient Medications   Medication Sig Dispense Refill     acetaminophen (TYLENOL) 500 MG tablet Take 1-2 tablets (500-1,000 mg total) by mouth every 4 (four) hours as needed.  0     anastrozole (ARIMIDEX) 1 mg tablet Take 1 tablet (1 mg total) by mouth daily. 30 tablet 6     cholecalciferol, vitamin D3, (VITAMIN D3) 2,000 unit Tab Take 1 tablet (2,000 Units total) by mouth daily. 90 tablet 3     magnesium hydroxide (MILK OF MAG) 400 mg/5 mL Susp suspension Take 30 mL by mouth daily as needed.  0     mirtazapine (REMERON) 7.5 MG tablet TAKE 1 TABLET BY MOUTH AT BEDTIME 30 tablet 11     omeprazole (PRILOSEC) 20 MG capsule Take 1 capsule (20 mg total) by mouth daily. 30 capsule 11     simvastatin (ZOCOR) 40 MG tablet TAKE 1 TABLET BY MOUTH DAILY AT BEDTIME 90 tablet 2     trimethoprim (TRIMPEX) 100 mg tablet ONE TABLET DAILY FOR THE FIRST SEVEN DAYS OF EACH MONTH.. 21 tablet 3     valsartan-hydrochlorothiazide (DIOVAN-HCT) 80-12.5 mg per tablet TAKE 1 TABLET BY MOUTH DAILY 30 tablet 11     vitamins  A,C,E-zinc-copper (PRESERVISION AREDS) 14,320-226-200 unit-mg-unit cap Take 1 capsule by mouth daily.        No current facility-administered medications for this visit.        Total data points: 3

## 2021-06-22 NOTE — PROGRESS NOTES
Chief Complaint   Patient presents with     Follow-up     6 months, patient states she has no questions and concerns

## 2021-06-22 NOTE — PROGRESS NOTES
This is a 89 y.o. woman who comes in for  continued follow-up of her left breast cancer.  She is now 12 months  status post left partial mastectomy without radiation.  She is doing well from her breast cancer standpoint but has had a lot of other issues.  Unfortunately she developed a severe GI bleed after being put on Xarelto.  When she was then taken off that she developed a blood clot in her leg and is now back on Coumadin.  Her  has also had a lot of medical problems which is been very draining for her.      Please see the chart review for PMH, Meds, allergies, FH and SH.    ROS:  A 12 point comprehensive review of systems was negative except as noted.      Physical Exam:  /80 (Patient Site: Left Arm, Patient Position: Sitting, Cuff Size: Adult Regular)   Pulse 90   Wt 139 lb (63 kg)   SpO2 97%   Breastfeeding? No   BMI 27.15 kg/m    General appearance: alert, appears stated age and cooperative  Breasts: There are no palpable masses.  The scar has healed up well.  Lymph nodes: Cervical, supraclavicular, and axillary nodes normal.  Neurologic: Grossly normal    Data Review: Reviewed her current mammograms that were just done today. No evidence of disease.      Impression: Personal History of breast cancer. NOD.  Is overall doing very well.  We will renew her anastrozole.    Plan: Follow up with me in 1 year with bilateral mammograms.

## 2021-06-23 NOTE — TELEPHONE ENCOUNTER
Refill Approved    Rx renewed per Medication Renewal Policy. Medication was last renewed on 5/10/18.    Michelle Moreno, [facility] Triage/Med Refill 1/28/2019     Requested Prescriptions   Pending Prescriptions Disp Refills     simvastatin (ZOCOR) 40 MG tablet [Pharmacy Med Name: SIMVASTATIN 40MG TABLETS] 90 tablet 0     Sig: TAKE 1 TABLET BY MOUTH DAILY AT BEDTIME    Statins Refill Protocol (Hmg CoA Reductase Inhibitors) Passed - 1/27/2019  3:31 AM       Passed - PCP or prescribing provider visit in past 12 months     Last office visit with prescriber/PCP: 11/30/2018 Car Radford MD OR same dept: 11/30/2018 Car Radford MD OR same specialty: 11/30/2018 Car Radford MD  Last physical: 6/1/2018 Last MTM visit: Visit date not found   Next visit within 3 mo: Visit date not found  Next physical within 3 mo: Visit date not found  Prescriber OR PCP: Car Radfrod MD  Last diagnosis associated with med order: 1. Hyperlipemia  - simvastatin (ZOCOR) 40 MG tablet [Pharmacy Med Name: SIMVASTATIN 40MG TABLETS]; TAKE 1 TABLET BY MOUTH DAILY AT BEDTIME  Dispense: 90 tablet; Refill: 0    If protocol passes may refill for 12 months if within 3 months of last provider visit (or a total of 15 months).

## 2021-06-23 NOTE — TELEPHONE ENCOUNTER
ANTICOAGULATION  MANAGEMENT    Assessment     Today's INR result of 3.0 is Therapeutic (goal INR of 2.0-3.0)        Warfarin taken as previously instructed    No new diet changes affecting INR lower appetite    No new medication/supplements affecting INR    Continues to tolerate warfarin with no reported s/s of bleeding or thromboembolism     Previous INR was Therapeutic     Discussed with Dee Dee, scheduled    Plan:     Spoke with Graciela regarding INR result and instructed:     Warfarin Dosing Instructions:  Continue current warfarin dose 5 mg daily on mon; and 2.5 mg daily rest of week  (0 % change)    Instructed patient to follow up no later than: two weeks      Graciela verbalizes understanding and agrees to warfarin dosing plan.    Instructed to call the [facility] for any changes, questions or concerns. (#[phone])   ?   Caro Branham RN    Subjective/Objective:      Graciela ALEN Ngosusan, a 89 y.o. female is on warfarin.     Graciela reports:     Home warfarin dose: verbally confirmed home dose with Graciela and updated on anticoagulation calendar     Missed doses: No     Medication changes:  No     S/S of bleeding or thromboembolism:  No     New Injury or illness:  No     Changes in diet or alcohol consumption:  Eats less     Upcoming surgery, procedure or cardioversion:  No    Anticoagulation Episode Summary     Current INR goal:   2.0-3.0   TTR:   43.2 % (1.3 mo)   Next INR check:   2/5/2019   INR from last check:   3.00 (1/22/2019)   Weekly max warfarin dose:      Target end date:   Indefinite   INR check location:      Preferred lab:      Send INR reminders to:   ANTICOAGULATION POOL A (WBY,WBE,MID,RSC)    Indications    Acute venous embolism and thrombosis of deep vessels of distal end of left lower extremity (H) [I82.4Z2]           Comments:            Anticoagulation Care Providers     Provider Role Specialty Phone number    Car Radford MD Referring Internal Medicine [phone]

## 2021-06-23 NOTE — TELEPHONE ENCOUNTER
The following steps were completed to comply with the REMS program for Prolia:  1. Ordering provider has previously reviewed information in the Medication Guide and Patient Counseling Chart, including the serious risks of Prolia  and the symptoms of each risk and have been advised  to seek prompt medical attention if they have signs or symptoms of any of the serious risks.  2. Provided each patient a copy of the Medication Guide and Patient Brochure.   See MAR for administration details.   Indication: Prolia  (denosumab) is a prescription medicine used to treat osteoporosis in patients who:   Are at high risk for fracture, meaning patients who have had a fracture related to osteoporosis, or who have multiple risk factors for fracture; Cannot use another osteoporosis medicine or other osteoporosis medicines did not work well.   The timeline for early/late injections would be 4 weeks early and any time after the 6 month wayne. If a patient receives their injection late, then the subsequent injection would be 6 months from the date that they actually received the injection    1.  When was the last injection?  7/16/2018    2.  Has insurance for this injection been verified?  Yes    3.  Did you experience any new onset achiness or rashes that lasted for over a month with your previous Prolia injection?   No    4.  Do you have a fever over 101?F or a new deep cough that is unusual for you today? No    5.  Have you started any new medications in the last 6 months that you were told could affect your immune system? These may have been prescribed by oncologist, transplant, rheumatology, or dermatology.   No    6.  In the last 6 months have you have gastric bypass or parathyroid surgery?   No    7.  Do you plan dental work requiring drilling into the bone such as implants/extractions or oral surgery in the next 2-3 months?   No

## 2021-06-23 NOTE — TELEPHONE ENCOUNTER
ANTICOAGULATION  MANAGEMENT    Assessment     Today's INR result of 2.4 is Therapeutic (goal INR of 2.0-3.0)        Warfarin taken as previously instructed    No new diet changes affecting INR    No new medication/supplements affecting INR    Continues to tolerate warfarin with no reported s/s of bleeding or thromboembolism     Previous INR was Therapeutic    Plan:     Spoke with Graciela regarding INR result and instructed:     Warfarin Dosing Instructions:  Continue current warfarin dose 5 mg daily on mon; and 2.5 mg daily rest of week  (0 % change)    Instructed patient to follow up no later than: one month      Graciela verbalizes understanding and agrees to warfarin dosing plan.    Instructed to call the [facility] for any changes, questions or concerns. (#[phone])   ?   Caro Branham RN    Subjective/Objective:      Graciela Hopkins, a 89 y.o. female is on warfarin.     Graciela reports:     Home warfarin dose: as updated on anticoagulation calendar per template     Missed doses: No     Medication changes:  No     S/S of bleeding or thromboembolism:  No     New Injury or illness:  No     Changes in diet or alcohol consumption:  No     Upcoming surgery, procedure or cardioversion:  No    Anticoagulation Episode Summary     Current INR goal:   2.0-3.0   TTR:   58.0 % (1.8 mo)   Next INR check:   3/5/2019   INR from last check:   2.40 (2/5/2019)   Weekly max warfarin dose:      Target end date:   Indefinite   INR check location:      Preferred lab:      Send INR reminders to:   ANTICOAGULATION POOL A (WBY,WBE,MID,RSC)    Indications    Acute venous embolism and thrombosis of deep vessels of distal end of left lower extremity (H) [I82.4Z2]           Comments:            Anticoagulation Care Providers     Provider Role Specialty Phone number    Car Radford MD Referring Internal Medicine [phone]

## 2021-06-24 NOTE — TELEPHONE ENCOUNTER
ANTICOAGULATION  MANAGEMENT PROGRAM    Graciela Hopkins is overdue for INR check.  Reminder call made.    Left message for Graciela. If returning call, please schedule INR check as soon as possible.    Caro Branham RN

## 2021-06-24 NOTE — TELEPHONE ENCOUNTER
Spoke with pt's daughter and relayed PCP message.  Pt's daughter understanding and agreeable.  Would like new prescription sent in.  Order pended.

## 2021-06-24 NOTE — TELEPHONE ENCOUNTER
Refill Request  Did you contact pharmacy: No  Medication name:   Requested Prescriptions     Pending Prescriptions Disp Refills     simvastatin (ZOCOR) 40 MG tablet 90 tablet 2     Sig: Take 1 tablet (40 mg total) by mouth at bedtime.     valsartan-hydrochlorothiazide (DIOVAN-HCT) 80-12.5 mg per tablet 30 tablet 11     Sig: Take 1 tablet by mouth daily.     trimethoprim (TRIMPEX) 100 mg tablet 21 tablet 3     Sig: ONE TABLET DAILY FOR THE FIRST SEVEN DAYS OF EACH MONTH.     Who prescribed the medication: Car Radford MD   Pharmacy Name and Location: [facility]  Is patient out of medication: No.  6 days left  Patient notified refills processed in 72 hours:  yes  Okay to leave a detailed message: no    This request is for a new pharmacy.

## 2021-06-24 NOTE — TELEPHONE ENCOUNTER
Increase mirtazapine to 15 mg at bedtime.  This hopefully will help with sleep and appetite.  Report the effect on symptoms in 3-4 weeks.

## 2021-06-24 NOTE — TELEPHONE ENCOUNTER
"Medication Question or Clarification  Who is calling: Other: Daughter Mercy - Consent is on file  What medication are you calling about?: mirtazapine (REMERON) 7.5 MG tablet  What dose do you take?: 7.5 mg  How often are you taking the medication?: Daily  Who prescribed the medication?: Car Radford MD  What is your question/concern?: Daughter is calling stating patient is not eating, barely sleeping, and \"looks like a gray stick\".  Patients spouse is on hospice currently and per daughter is going down hill pretty quick.    They are wondering if medication can be increased for patient or what Car Radford MD advises on a medication that can help.  Please advise.  Pharmacy: [facility] #[id number]  Okay to leave a detailed message?: Yes - [phone]  Site CMT - Please call the pharmacy to obtain any additional needed information.  "

## 2021-06-25 NOTE — TELEPHONE ENCOUNTER
ANTICOAGULATION  MANAGEMENT    Assessment     Today's INR result of 1.3 is Subtherapeutic (goal INR of 2.0-3.0)        Warfarin taken as previously instructed suspect missed doses with 's recent passing    No new diet changes affecting INR    No new medication/supplements affecting INR    Continues to tolerate warfarin with no reported s/s of bleeding or thromboembolism     Previous INR was Therapeutic    Plan:     Left a detailed message for daughter Dee Dee regarding INR result and instructed:     Warfarin Dosing Instructions:  Continue current warfarin dose 5 mg daily on mon; and 2.5 mg daily rest of week  (0 % change)    Instructed patient to follow up no later than: one week    Instructed to call the [facility] for any changes, questions or concerns. (#[phone])   ?   Caro Branham RN    Subjective/Objective:      Graciela Hopkins, a 89 y.o. female is on warfarin.     Graciela reports:     Home warfarin dose: as updated on anticoagulation calendar per template     Missed doses: possibly     Medication changes:  No     S/S of bleeding or thromboembolism:  No     New Injury or illness:  No     Changes in diet or alcohol consumption:  No     Upcoming surgery, procedure or cardioversion:  No    Anticoagulation Episode Summary     Current INR goal:   2.0-3.0   TTR:   48.6 % (3.1 mo)   Next INR check:   3/25/2019   INR from last check:   1.30! (3/18/2019)   Weekly max warfarin dose:      Target end date:   Indefinite   INR check location:      Preferred lab:      Send INR reminders to:   ANTICOAGULATION POOL A (WBY,WBE,MID,RSC)    Indications    Acute venous embolism and thrombosis of deep vessels of distal end of left lower extremity (H) [I82.4Z2]           Comments:            Anticoagulation Care Providers     Provider Role Specialty Phone number    Car Radford MD Referring Internal Medicine [phone]

## 2021-06-25 NOTE — TELEPHONE ENCOUNTER
Anticoagulation Management    Discussed INR home monitoring program with Dee Dee Temo,  daughter of Graciela Hopkins reviewing:      Elibigility requirements: >= 3 months of anticoagulation therapy, indication for chronic anticoagulation and order from provider    Required testing frequency (q1-2 weeks)    Home meters, testing supplies, meter training, and reporting of INR results done through an outside company. Patient would be contacted by home monitoring company to review insurance coverage with home monitoring company prior to enrolling.    [facility] would continue to receive and manage INR results.    Home monitoring application may take several weeks and must continue to follow up with recommended INR monitoring in clinic until receives monitor and training completed.       Graciela Hopkins is interested home INR monitoring and requests order be submitted.      Dee Dee will be the point person for this [phone]    Caro Branham RN

## 2021-06-25 NOTE — TELEPHONE ENCOUNTER
FYI - Status Update  Who is Calling: Child  Update:  passed and they have not been able to get her INR checked. She mad an appointment for 12:45PM today but she is not sure she will be able to get Graciela to actually do it. Since the passing of her , shes not eating or sleeping. Dee Dee would also like to start the process of getting a machine to do her INR's at home. Please advise  Okay to leave a detailed message?:  Yes

## 2021-06-25 NOTE — TELEPHONE ENCOUNTER
Who is calling:  Dee Dee  daughter  Reason for Call:  The patient's daughter is retuning a call to anticoagulation for patient INR instructions. Please return her call at the number provided.  Okay to leave a detailed message: Yes

## 2021-06-25 NOTE — TELEPHONE ENCOUNTER
Spoke with daughter Dee Dee, Graciela's  just passed. they will come for inr today and we'll start the process for home monitoring.   Requesting a hgb today as well which we should do, orders requested

## 2021-07-13 ENCOUNTER — RECORDS - HEALTHEAST (OUTPATIENT)
Dept: ADMINISTRATIVE | Facility: CLINIC | Age: 86
End: 2021-07-13

## 2021-11-09 NOTE — PROGRESS NOTES
Gastroenterology Inpatient Sign Off Note    ASSESSMENT:  89 year old female with a history of Prothrombin gene mutation and hypercoagulability, recent DVT on 12/2018, UGI bleed with EGD in 11/2018 notable for  England's esophagus, large hiatal hernia and GAVE who presented to ED with weakness, confusion, ADRIANNA, Hyperkalemia and acute on chronic anemia (9.7 03/2019 -> 7.8 05/16/2019) without overt bleeding. Anemia likely multifactorial insetting of acute infection and potential slow GI losses with anticoagulation.Hemoglobin remains stable without overt bleeding, plan four outpatient EGD.    RECOMMENDATIONS:  -- Anticoagulation per primary team  -- Continue PO omprazole 20mg PO BID 30 minutes prior to meals until repeat EGD  -- Follow up recommendations:    -- Repeat CBC in one week after discharge with PCP   -- PCP or outpatient anticoagulation clinic also to coordinate warfarin for outpatient EGD (will need to be held)   -- Outpatient EGD in the next 4-6 weeks to assess GAVE and DENITA (order placed in discontinue navigator for you)   --  No outpatient GI clinic follow-up in clinic indicated. Follow-up with primary care provider at timing determined by discharge physician.   --  If outpatient GI follow-up is felt indicated by primary care, they may coordinate this by placing new GI referral and contacting  General GI clinic - ([phone])    -- Inpatient GI consults service will sign off. No further recommendations at this time. If primary team has addition questions, please page consult fellow listed in [facility].    Patient and plan discussed with Dr. Gigi De La Cruz MD  GI Fellow, PGY-4  P: [phone]           Ear Star Wedge Flap Text: The defect edges were debeveled with a #15 blade scalpel.  Given the location of the defect and the proximity to free margins (helical rim) an ear star wedge flap was deemed most appropriate.  Using a sterile surgical marker, the appropriate flap was drawn incorporating the defect and placing the expected incisions between the helical rim and antihelix where possible.  The area thus outlined was incised through and through with a #15 scalpel blade.

## 2021-12-01 NOTE — PROGRESS NOTES
Anesthesia Volume In Cc: 5 Post Discharge Medication Reconciliation Status: discharge medications reconciled and changed, per note/orders (see AVS)  ASSESSMENT:  1.  Chronic blood loss anemia:  Graciela was transfused with 2 units of PRBCs after hemoglobin dropped to 7.  She also received 3 doses of IV Venofer.  She is on chronic anticoagulation with warfarin due to recurrent deep venous thrombosis with prothrombin gene mutation.  She had evidence for watermelon stomach on recent upper GI endoscopy along with a history of England's esophagus.  Is undoubtedly are contributing factors for bleeding    2.  Chronic anticoagulation due to deep venous thrombosis:  Daughter still would like to continue her on anticoagulation.  They would prefer to keep INR low.  Value of 1.8-2.2 would seem reasonable    3.  Malnutrition:  Oral intake has been poor.  I suspect much of her problem is depression.  Her  recently .  She was placed on Megace during her hospital stay.  I would prefer to go off of this since she has a recent diagnosis of breast cancer which is progesterone positive.  She also has heightened clotting risk given the prothrombin gene mutation.  Marinol was discussed as an alternative to Megace.  Family is interested in trying this.    4.  Situational depression:  She is on mirtazapine at bedtime.  She needs to watch for excessive sedation    5.  Breast cancer:  This was estrogen and progesterone receptor positive.  She underwent a lumpectomy and is on Arimidex    6.  Essential hypertension:  Blood pressure is now at the low end of normal    7.  Chronic renal insufficiency:  Renal function studies were not that much different from her usual baseline despite poor oral intake prior to admission    8.  Hyperkalemia:  I suspect this is largely related to her chronic renal insufficiency and aggravated by low-grade GI blood loss.  She was placed on bicarb during her recent hospital stay.  The potassium will be rechecked    PLAN:  1.  Stop  Megace    2.  Marinol 2.5 mg twice daily before meals    3.  Lab as outlined.  She will be notified of test results    4.  Recheck INR, hemogram and BMP in 1 week    5.  Clinic follow-up in 1 month    6.  Post Discharge Medication Reconciliation Status: discharge medications reconciled and changed, per note/orders (see AVS)    Orders Placed This Encounter   Procedures     Basic Metabolic Panel     Magnesium     Prealbumin     Iron and Transferrin Iron Binding Capacity     HM1 (CBC with Diff)     Medications Discontinued During This Encounter   Medication Reason     megestrol (MEGACE) 20 mg tablet Therapy completed       Return in about 4 weeks (around 8/5/2019) for Recheck.    ASSESSED PROBLEMS:  1. Iron deficiency anemia due to chronic blood loss  Iron and Transferrin Iron Binding Capacity    CANCELED: HM2(CBC w/o Differential)   2. Chronic renal insufficiency, stage 3 (moderate) (H)  Basic Metabolic Panel   3. Hypomagnesemia  Magnesium   4. Moderate protein-calorie malnutrition (H)  Prealbumin    dronabinol (MARINOL) 2.5 MG capsule   5. Acute venous embolism and thrombosis of deep vessels of distal end of left lower extremity (H)  INR   6. Iron deficiency  HM1 (CBC and Differential)    HM1 (CBC with Diff)   7. Malignant neoplasm of lower-inner quadrant of left breast in female, estrogen receptor positive (H)         CHIEF COMPLAINT:  Chief Complaint   Patient presents with     Follow-up     DOD 7/5/19     Medication Management     Discuss meds       HISTORY OF PRESENT ILLNESS:  Graciela is a 89 y.o. female seen for follow-up after a recent hospitalization at [facility] from 7/4 to 7/7.  Has had recent issues with hyperkalemia and poor oral intake.  Potassium was 5.9 on arrival to the emergency room with creatinine of 1.87.  With hydration, globin dropped to 7.0.  Hyperkalemia was treated with IV fluids, Lasix, Kayexalate, and bicarb.  Consult by Dr. Munoz and was started on bicarb for metabolic acidosis.    She  has had poor oral intake and significant situational depression after the death of her  this spring.  Has been on mirtazapine.  And was placed on Megace for appetite stimulation.  She received 3 doses of Venofer for iron deficiency anemia    She has been staying with her daughter who is a physician.  Graciela seems more interactive and stronger today than when last seen she denies shortness of breath or abdominal pain    REVIEW OF SYSTEMS:    Comprehensive review of systems is negative except as noted above    PFSH:    this spring.  She has been staying with her daughter    TOBACCO USE:  Social History     Tobacco Use   Smoking Status Never Smoker   Smokeless Tobacco Never Used       VITALS:  Vitals:    19 1415   BP: 106/48   Patient Site: Left Arm   Patient Position: Sitting   Cuff Size: Adult Regular   Pulse: 80   SpO2: 97%   Weight: 122 lb (55.3 kg)   Height: 5' (1.524 m)     Wt Readings from Last 3 Encounters:   19 122 lb (55.3 kg)   19 120 lb (54.4 kg)   19 116 lb (52.6 kg)       PHYSICAL EXAM:  Constitutional:   Reveals an alert soft-spoken woman who appears to have a rather depressed affect.  She can ambulate without assistance and gets up on the exam table with moderate assistance.   vitals: per nursing notes.  HEENT: Atraumatic  Eyes:  EOMs full, PERRL.  No scleral icterus  Oropharynx:   Mouth and throat clear, no thrush or exudate.  Neck:  Supple, no carotid bruits or adenopathy.  Back:  No spine or CVA pain.  Moderate thoracic kyphosis  Thorax:  No bony deformities.  Lungs: Clear to A&P without rales or wheezes.  Respiratory effort normal.  Cardiac:   Regular rate and rhythm, normal S1, S2, no murmur or gallop.  Abdomen:  Soft, active bowel sounds without bruits, mass, or tenderness.  Extremities:   Trace peripheral edema, pulses in the feet intact.    Skin:  No jaundice, peripheral cyanosis or lesions to suggest malignancy.  Scattered ecchymoses  Neuro:  Alert and  oriented.  No gross focal deficits.  Psychiatric:  Memory intact, mood appropriate.    DATA REVIEWED:  Additional History from Old Records Summarized (2): None.  Decision to Obtain Records (1): None.   Radiology Tests Summarized or Ordered (1): None.  Labs Reviewed or Ordered (1): None.  Medicine Test Summarized or Ordered (1): None.   Independent Review of EKG or X-RAY(2 each): None.    The visit lasted a total of 35 minutes face to face with the patient. Over 50% of the time was spent counseling and educating the patient about management of anemia.  Discussing methods to improve nutrition.    .    Dragon dictation was used for this note. Speech recognition errors are a possibility.     MEDICATIONS:  Current Outpatient Medications   Medication Sig Dispense Refill     anastrozole (ARIMIDEX) 1 mg tablet Take 1 mg by mouth daily.       cholecalciferol, vitamin D3, (VITAMIN D3) 2,000 unit Tab Take 1 tablet (2,000 Units total) by mouth daily. 90 tablet 3     magnesium hydroxide (MILK OF MAG) 400 mg/5 mL Susp suspension Take 30 mL by mouth daily as needed.  0     magnesium oxide (MAG-OX) 400 mg (241.3 mg magnesium) tablet Take 1 tablet (400 mg total) by mouth 2 (two) times a day for 7 days.  0     mirtazapine (REMERON) 15 MG tablet Take 1 tablet (15 mg total) by mouth at bedtime. 90 tablet 2     sodium bicarbonate 650 MG tablet Take 1 tablet (650 mg total) by mouth 3 (three) times a day. OTC product 90 tablet 0     vitamins  A,C,E-zinc-copper (PRESERVISION AREDS) [phone],[id number] unit-mg-unit cap Take 1 capsule by mouth daily.        warfarin (COUMADIN/JANTOVEN) 2.5 MG tablet Take 3.75mg on Monday,Thursday, Saturday; and 2.5mg on rest of the days of the week  0     dronabinol (MARINOL) 2.5 MG capsule Take 1 capsule (2.5 mg total) by mouth 2 (two) times a day before meals. 60 capsule 2     Current Facility-Administered Medications   Medication Dose Route Frequency Provider Last Rate Last Dose     denosumab 60 mg (PROLIA 60  mg/ml)  60 mg Subcutaneous Q6 Months Klaus Flores, PharmD   60 mg at 01/22/19 1045

## 2022-12-03 NOTE — PLAN OF CARE
OT 6C  Discharge Planner OT   Patient plan for discharge: home with home therapies   Current status: Pt IND with supine <> sitting but requiring max A x 2 for boosting up in bed. Initially min A for LB dressing but progressing to SBA with cuing for technique. STS CGA. Functional mobility with both cane and FWW with improved stability using FWW, most notably with head turns. Pt completing 3 stairs x 2 sets with unilateral railing CGA. Educated on home setup modifications, recommended assist/supervision for home, and home OT/PT  Barriers to return to prior living situation: medical status   Recommendations for discharge: home with home OT/PT   Rationale for recommendations: Pt mobilizing near baseline with use of SEC and demonstrating adequate safety for discharge home. Daughter very insightful and receptive of recommendations and with plans for home remodel for greater home access and safety for patient. Home PT/OT to progress strength, endurance, and overall safety with daily activities.       Entered by: Sharifa Arenas 06/24/2019 2:59 PM        None

## 2023-09-22 NOTE — PLAN OF CARE
[facility] - PODIATRY    Today's Date: 23    Patient Name: Abdi Gomez  MRN: [id number]  CSN: [id number]  PCP: Jaimie Stallworth APRN, Last PCP Visit:  7/3/2023  Referring Provider: No ref. provider found    SUBJECTIVE     Chief Complaint   Patient presents with    Left Foot - Follow-up, Nail Problem, Diabetes    Right Foot - Follow-up, Nail Problem, Diabetes     HPI: Abdi Gomez, a 76 y.o.male, presents to clinic for painful toenail and a diabetic foot evaluation.    New, Established, New Problem:  est  Location:  Toenails  Duration:   Greater than five years  Onset:  Gradual  Nature:  sore with palpation.  Stable, worsening, improving:   improving  Aggravating factors:  Pain with shoe gear and ambulation.  Previous Treatment: debridement    Patient controlling diabetes via: Oral medication    Medical changes:  none    Patient states their most recent blood glucose readin      Patient denies any fevers, chills, nausea, vomiting, shortness of breath, nor any other constitutional signs nor symptoms.    Relates numbness in his first toes bilaterally.    Past Medical History:   Diagnosis Date    Asthma     COPD (chronic obstructive pulmonary disease)     Coronary artery disease     Coronary artery disease involving native coronary artery of native heart without angina pectoris 2021     Status post angioplasty and stent placement in  (details not available)    Diabetes mellitus     Difficulty walking     Gout     Hyperlipidemia     Hypertension     Pneumonia 2019    Primary central sleep apnea     Sleep apnea     Sleep apnea, obstructive      Past Surgical History:   Procedure Laterality Date    CARDIAC CATHETERIZATION      CORONARY STENT PLACEMENT       Family History   Problem Relation Age of Onset    Heart failure Mother     Cancer Father         Passed with lung cancer     Social History     Socioeconomic History    Marital status:    Tobacco  Discharge Planner PT   Patient plan for discharge: Return to daughter's home with assist  Current status: Pt presents with NSTEMI and fall, likely happened in conjunction.  Pt has baseline balance impairments and has been having some difficulty with stairs at home.  Daughter is aware of this, plans to increase supervision for the stairs and they have put a commode on main level of home.   Pt is basically at her mobility baseline now, mild cardiac endurance impairments, and no acute PT needs have been identified to facilitate discharge.   Barriers to return to prior living situation: None from mobility standpoint  Recommendations for discharge: Home with daughter and increased supervision/assist and HHPT/OT  Rationale for recommendations: Pt has some mobility and ADL impairments but not so limiting that home is not safe.  Will benefit from strength, endurance and safety training with HH therapies.  PT to sign off, no interventions required in acute hospital.        Entered by: Pat Blair 06/24/2019 5:17 PM        Use    Smoking status: Former     Packs/day: 1.50     Years: 40.00     Pack years: 60.00     Types: Cigarettes     Start date: 1963     Quit date: 1999     Years since quittin.7     Passive exposure: Past    Smokeless tobacco: Never   Vaping Use    Vaping Use: Never used   Substance and Sexual Activity    Alcohol use: Never    Drug use: Never    Sexual activity: Not Currently     Partners: Female     Allergies   Allergen Reactions    Atorvastatin Other (See Comments)    Hydrocodone Irritability    Keflex [Cephalexin] Rash     Current Outpatient Medications   Medication Sig Dispense Refill    allopurinol (ZYLOPRIM) 100 MG tablet Take 1 tablet by mouth Daily.      aspirin 81 MG chewable tablet Chew 1 tablet Daily.      cetirizine (zyrTEC) 10 MG tablet Take 1 tablet by mouth Daily.      dilTIAZem CD (CARDIZEM CD) 120 MG 24 hr capsule Take 1 capsule by mouth Daily. 90 capsule 3    ezetimibe (ZETIA) 10 MG tablet Take 1 tablet by mouth Daily.      famotidine (PEPCID) 20 MG tablet Take 1 tablet by mouth 2 (Two) Times a Day.      fluticasone (FLONASE) 50 MCG/ACT nasal spray INSTILL 2 PUFFS INTO EACH NOSTRIL ONCE DAILY      Fluticasone-Salmeterol (WIXELA INHUB IN) Inhale 250 mcg 2 (Two) Times a Day.      glipizide (GLUCOTROL XL) 2.5 MG 24 hr tablet Take 1 tablet by mouth 2 (Two) Times a Day.      hydroCHLOROthiazide (MICROZIDE) 12.5 MG capsule Take 1 capsule by mouth Every Morning.      hydroxychloroquine (PLAQUENIL) 200 MG tablet Take 1 tablet by mouth 2 (Two) Times a Day.      losartan (Cozaar) 100 MG tablet Take 1 tablet by mouth Daily. 90 tablet 3    Misc Natural Products (BLACK CHERRY CONCENTRATE PO) Take  by mouth Daily.      montelukast (SINGULAIR) 10 MG tablet Take 1 tablet by mouth Every Night.      Nintedanib Esylate (Ofev) 150 MG capsule Take 150 mg by mouth 2 (Two) Times a Day. 60 capsule 5    Omega-3 Fatty Acids (fish oil) 1200 MG capsule capsule Take 1 capsule by mouth Daily.      Red Yeast Rice  Extract 600 MG capsule Take  by mouth Daily.      tiotropium bromide monohydrate (Spiriva Respimat) 2.5 MCG/ACT aerosol solution inhaler Inhale 2 puffs Daily. 2 each 0    Ventolin  (90 Base) MCG/ACT inhaler Inhale 2 puffs Every 6 (Six) Hours As Needed.      vitamin B-12 (CYANOCOBALAMIN) 1000 MCG tablet Take 1 tablet by mouth Daily.      VITAMIN D, CHOLECALCIFEROL, PO Take  by mouth Daily.      clindamycin (CLEOCIN) 150 MG capsule  (Patient not taking: Reported on 2023)      ipratropium-albuterol (DUO-NEB) 0.5-2.5 mg/3 ml nebulizer Take 3 mL by nebulization 4 (Four) Times a Day As Needed for Wheezing for up to 90 days. 360 mL 3     No current facility-administered medications for this visit.     Review of Systems   Constitutional: Negative.    Skin:         Painful toenails   Neurological:  Positive for numbness.   All other systems reviewed and are negative.    OBJECTIVE     Vitals:    23 1050   BP: 137/83   Pulse: 87   Temp: 97.4 °F (36.3 °C)   SpO2: 98%       Body mass index is 37.59 kg/m².    No results found for: HGBA1C    Lab Results   Component Value Date    GLUCOSE 67 2023    CALCIUM 10.4 2023     2023    K 4.4 2023    CO2 26.0 2023     2023    BUN 16 2023    CREATININE 1.36 (H) 2023    EGFRIFNONA 60 (L) 2021    BCR 11.8 2023    ANIONGAP 11.0 2023       Patient seen in no apparent distress.      PHYSICAL EXAM:     Foot/Ankle Exam    GENERAL  Appearance:  obese and elderly  Orientation:  AAOx3  Affect:  appropriate  Gait:  unimpaired  Assistance:  independent  Right shoe gear: casual shoe  Left shoe gear: casual shoe    VASCULAR     Right Foot Vascularity   Dorsalis pedis:  2+  Posterior tibial:  2+  Skin temperature:  warm  Edema gradin+ and non-pitting  CFT:  < 3 seconds  Pedal hair growth:  Absent  Varicosities:  mild varicosities     Left Foot Vascularity   Dorsalis pedis:  2+  Posterior tibial:  2+  Skin  temperature:  warm  Edema gradin+ and non-pitting  CFT:  < 3 seconds  Pedal hair growth:  Absent  Varicosities:  mild varicosities     NEUROLOGIC     Right Foot Neurologic   Light touch sensation: diminished  Vibratory sensation: diminished  Hot/Cold sensation: diminished  Protective Sensation using Davenport-Shun Monofilament:   Sites intact: 2  Sites tested: 10     Left Foot Neurologic   Light touch sensation: diminished  Vibratory sensation: diminished  Hot/Cold sensation:  diminished  Protective Sensation using Dav[place]-Shun Monofilament:   Sites intact: 2  Sites tested: 10    MUSCLE STRENGTH     Right Foot Muscle Strength   Foot dorsiflexion:  4  Foot plantar flexion:  4  Foot inversion:  4  Foot eversion:  4     Left Foot Muscle Strength   Foot dorsiflexion:  4  Foot plantar flexion:  4  Foot inversion:  4  Foot eversion:  4    RANGE OF MOTION     Right Foot Range of Motion   Foot and ankle ROM within normal limits       Left Foot Range of Motion   Foot and ankle ROM within normal limits      DERMATOLOGIC      Right Foot Dermatologic   Skin  Right foot skin is intact.   Nails  1.  Positive for elongated, onychomycosis, abnormal thickness, subungual debris and ingrown toenail.  2.  Positive for elongated, onychomycosis, abnormal thickness, subungual debris and ingrown toenail.     Left Foot Dermatologic   Skin  Left foot skin is intact.   Nails  1.  Positive for elongated, onychomycosis, abnormal thickness, subungual debris and ingrown toenail.  2.  Positive for elongated, onychomycosis, abnormal thickness, subungual debris and ingrown toenail.  3.  Positive for elongated, onychomycosis, abnormal thickness, subungual debris and ingrown toenail.  4.  Positive for elongated, onychomycosis, abnormally thick, subungual debris and ingrown toenail.    Diabetic Foot Exam Performed and Monofilament Test Performed    ASSESSMENT/PLAN     Diagnoses and all orders for this visit:    1. Diabetic foot  (Primary)    2. Neuropathy    3. Foot pain, bilateral    4. Onychocryptosis    5. Non-insulin dependent type 2 diabetes mellitus    6. Onychomycosis        Comprehensive lower extremity examination and evaluation was performed.    Discussed findings and treatment plan including risks, benefits, and treatment options with patient in detail. Patient agreed with treatment plan.    Medications and allergies reviewed.  Reviewed available blood glucose and HgB A1C lab values along with other pertinent labs.  These were discussed with the patient as to their importance of diabetic maintenance.    Toenails 1, 2 on Right and 1, 2, 3, 4 on Left were debrided with nail nippers then filed with a Jia.commel nail doris.  Patient tolerated procedure well without complications.    Diabetic foot exam performed and documented this date, compliant with CQM required standards. Detail of findings as noted in physical exam.  Lower extremity Neurologic exam for diabetic patient performed and documented this date, compliant with PQRS required standards. Detail of findings as noted in physical exam.  Advised patient importance of good routine lower extremity hygiene. Advised patient importance of evaluating for intact skin and pain free nail borders.  Advised patient to use mirror to evaluate plantar/ soles of feet for better visualization. Advised patient monitor and phone office to be seen if any cracking to skin, open lesions, painful nail borders or if nails become elongated prior to next visit. Advised patient importance of daily cleansing of lower extremities, followed by good skin cream to maintain normal hydration of skin. Also advised patient importance of close daily monitoring of blood sugar. Advised to regulate diet and medications to maintain control of blood sugar in optimal range. Contact primary care provider if difficulties maintaining blood sugar levels.  Advised Patient of presence of Diabetes Mellitus condition.  Advised  Patient risk of progression and worsening or improvement, then return of condition.  Will monitor condition for any change in future. Treat with most appropriate treatment pending status of condition.  Counseled and advised patient extensively on nature and ramifications of diabetes. Standard instructions given to patient for good diabetic foot care and maintenance. Advised importance of careful monitoring to avoid break down and complications secondary to diabetes. Advised patient importance of strict maintenance of blood sugar control. Advised patient of possible ominous results from neglect of condition, i.e.: amputation/ loss of digits, feet and legs, or even death.  Patient states understands counseling, will monitor closely, continue good hygiene and routine diabetic foot care. Patient will contact office should they have any questions or problems.      An After Visit Summary was printed and given to the patient at discharge, including (if requested) any available informative/educational handouts regarding diagnosis, treatment, or medications. All questions were answered to patient/family satisfaction. Should symptoms fail to improve or worsen they agree to call or return to clinic or to go to the Emergency Department. Discussed the importance of following up with any needed screening tests/labs/specialist appointments and any requested follow-up recommended by me today. Importance of maintaining follow-up discussed and patient accepts that missed appointments can delay diagnosis and potentially lead to worsening of conditions.    Return in about 9 weeks (around 11/24/2023) for Toenail Care., or sooner if acute issues arise.    This document has been electronically signed by Richie Watson DPM on September 22, 2023 10:59 EDT

## 2024-06-14 NOTE — TELEPHONE ENCOUNTER
Indications:  The patient is a 58 year old White female with breast cancer who will be undergoing a course of radiation therapy to the breast.  A respiratory motion management simulation was ordered to evaluate respiratory motion and plan using deep inspiratory breath hold treatment.  Informed consent was obtained prior to the procedure.    Simulation Procedure:  The patient was placed on the CT/simulator table for a treatment planning CT scan with simulation (please refer to the Simulation Note).  The patient was coached on achieving a reproducible breathing pattern. An external tracking box was used and oriented on the patient. The respiratory monitoring system was then adjusted for the optimum anatomic position to yield the most appropriate signal strength and the best correlation with the anticipated motion. CT  images and adjustments were made to achieve the desired alignment. Upper and lower borders for scanning were selected based on the intended treatment volume. Field of view was reviewed to ensure inclusion of all relevant tissues and skin markers on the image set.    Deep inspiration breath hold CT images were then obtained with the real-time recording of a respiratory signal simultaneously. The deep inspiratory breath hold scan was fused to the free breathing scan, and the patient's anatomy in each scan was evaluated. The physician evaluated the anticipated beam arrangements/target volume and organs at risk to determine if the deep inspiratory breath hold treatment would provide superior sparing of organs at risk. The final isodose plan was reviewed to ensure coverage of the target while ensuring dose constraints to the organs at risk were met.    I was present for and supervised the above simulation.     Order both BMP and Hem2.  She should have a standing order for the Hem 2.
